# Patient Record
Sex: FEMALE | Race: WHITE | NOT HISPANIC OR LATINO | Employment: UNEMPLOYED | URBAN - METROPOLITAN AREA
[De-identification: names, ages, dates, MRNs, and addresses within clinical notes are randomized per-mention and may not be internally consistent; named-entity substitution may affect disease eponyms.]

---

## 2018-07-23 ENCOUNTER — HOSPITAL ENCOUNTER (OUTPATIENT)
Dept: FAMILY MEDICINE CLINIC | Facility: CLINIC | Age: 43
Discharge: HOME | End: 2018-07-23
Attending: FAMILY MEDICINE | Admitting: FAMILY MEDICINE

## 2018-07-24 ENCOUNTER — HOSPITAL ENCOUNTER (OUTPATIENT)
Dept: FAMILY MEDICINE CLINIC | Facility: CLINIC | Age: 43
Setting detail: SPECIMEN
Discharge: HOME | End: 2018-07-24
Attending: FAMILY MEDICINE | Admitting: FAMILY MEDICINE

## 2018-07-24 LAB
ALBUMIN SERPL-MCNC: 4.5 G/DL (ref 3.5–4.8)
ALBUMIN/GLOB SERPL: 1.4 {RATIO} (ref 1–1.7)
ALP SERPL-CCNC: 52 IU/L (ref 32–91)
ALT SERPL-CCNC: 23 IU/L (ref 14–54)
ANION GAP SERPL CALC-SCNC: 10.6 MMOL/L (ref 10–20)
AST SERPL-CCNC: 27 IU/L (ref 15–41)
BASOPHILS # BLD AUTO: 0.1 10*3/UL (ref 0–0.2)
BASOPHILS NFR BLD AUTO: 1 % (ref 0–2)
BILIRUB SERPL-MCNC: 0.6 MG/DL (ref 0.3–1.2)
BUN SERPL-MCNC: 4 MG/DL (ref 8–20)
BUN/CREAT SERPL: 6.7 (ref 5.4–26.2)
CALCIUM SERPL-MCNC: 9.2 MG/DL (ref 8.9–10.3)
CHLORIDE SERPL-SCNC: 103 MMOL/L (ref 101–111)
CHOLEST SERPL-MCNC: 240 MG/DL
CHOLEST/HDLC SERPL: 5.1 {RATIO}
CONV CO2: 23 MMOL/L (ref 22–32)
CONV LDL CHOLESTEROL DIRECT: 175 MG/DL (ref 0–100)
CONV TOTAL PROTEIN: 7.7 G/DL (ref 6.1–7.9)
CREAT UR-MCNC: 0.6 MG/DL (ref 0.4–1)
DIFFERENTIAL METHOD BLD: (no result)
EOSINOPHIL # BLD AUTO: 0.2 10*3/UL (ref 0–0.3)
EOSINOPHIL # BLD AUTO: 1 % (ref 0–3)
ERYTHROCYTE [DISTWIDTH] IN BLOOD BY AUTOMATED COUNT: 17.3 % (ref 11.5–14.5)
ERYTHROCYTE [SEDIMENTATION RATE] IN BLOOD BY WESTERGREN METHOD: 18 MM/HR (ref 0–20)
GLOBULIN UR ELPH-MCNC: 3.2 G/DL (ref 2.5–3.8)
GLUCOSE SERPL-MCNC: 94 MG/DL (ref 65–99)
HCT VFR BLD AUTO: 37.1 % (ref 35–49)
HDLC SERPL-MCNC: 47 MG/DL
HGB BLD-MCNC: 11.7 G/DL (ref 12–15)
LDLC/HDLC SERPL: 3.7 {RATIO}
LIPID INTERPRETATION: ABNORMAL
LYMPHOCYTES # BLD AUTO: 2.3 10*3/UL (ref 0.8–4.8)
LYMPHOCYTES NFR BLD AUTO: 20 % (ref 18–42)
MCH RBC QN AUTO: 24.1 PG (ref 26–32)
MCHC RBC AUTO-ENTMCNC: 31.6 G/DL (ref 32–36)
MCV RBC AUTO: 76.5 FL (ref 80–94)
MONOCYTES # BLD AUTO: 0.7 10*3/UL (ref 0.1–1.3)
MONOCYTES NFR BLD AUTO: 6 % (ref 2–11)
NEUTROPHILS # BLD AUTO: 8.4 10*3/UL (ref 2.3–8.6)
NEUTROPHILS NFR BLD AUTO: 72 % (ref 50–75)
NRBC BLD AUTO-RTO: 0 /100{WBCS}
NRBC/RBC NFR BLD MANUAL: 0 10*3/UL
PLATELET # BLD AUTO: 336 10*3/UL (ref 150–450)
PMV BLD AUTO: 9.1 FL (ref 7.4–10.4)
POTASSIUM SERPL-SCNC: 3.6 MMOL/L (ref 3.6–5.1)
RBC # BLD AUTO: 4.85 10*6/UL (ref 4–5.4)
SODIUM SERPL-SCNC: 133 MMOL/L (ref 136–144)
TRIGL SERPL-MCNC: 153 MG/DL
URATE SERPL-MCNC: 5.8 MG/DL (ref 2.6–8)
VLDLC SERPL CALC-MCNC: 17.6 MG/DL
WBC # BLD AUTO: 11.6 10*3/UL (ref 4.5–11.5)

## 2018-07-25 LAB
ANA SER QL IA: NORMAL
CONV HIV-1/ HIV-2: NORMAL
CONV HIV-1/ HIV-2: NORMAL

## 2018-07-26 LAB
HAV IGM SERPL QL IA: NONREACTIVE
HBV CORE IGM SERPL QL IA: NONREACTIVE
HBV SURFACE AG SERPL QL IA: NONREACTIVE
HCV AB SER DONR QL: NORMAL
HCV AB SER DONR QL: NORMAL

## 2018-08-20 ENCOUNTER — HOSPITAL ENCOUNTER (OUTPATIENT)
Dept: FAMILY MEDICINE CLINIC | Facility: CLINIC | Age: 43
Setting detail: SPECIMEN
Discharge: HOME | End: 2018-08-20
Attending: FAMILY MEDICINE | Admitting: FAMILY MEDICINE

## 2018-08-20 LAB
BACTERIA SPEC AEROBE CULT: NORMAL
C TRACH RRNA SPEC QL PROBE: NORMAL
Lab: NORMAL
MICRO REPORT STATUS: NORMAL
N GONORRHOEA RRNA SPEC QL PROBE: NORMAL
SPECIMEN SOURCE: NORMAL
SPECIMEN SOURCE: NORMAL

## 2018-08-29 ENCOUNTER — HOSPITAL ENCOUNTER (OUTPATIENT)
Dept: MAMMOGRAPHY | Facility: HOSPITAL | Age: 43
Discharge: HOME | End: 2018-08-29
Attending: FAMILY MEDICINE | Admitting: FAMILY MEDICINE

## 2018-09-10 ENCOUNTER — HOSPITAL ENCOUNTER (OUTPATIENT)
Dept: FAMILY MEDICINE CLINIC | Facility: CLINIC | Age: 43
Discharge: HOME | End: 2018-09-10
Attending: FAMILY MEDICINE | Admitting: FAMILY MEDICINE

## 2018-10-05 ENCOUNTER — OUTSIDE FACILITY SERVICE (OUTPATIENT)
Dept: NEUROLOGY | Facility: CLINIC | Age: 43
End: 2018-10-05

## 2018-10-05 ENCOUNTER — HOSPITAL ENCOUNTER (OUTPATIENT)
Dept: NEUROLOGY | Facility: HOSPITAL | Age: 43
Discharge: HOME | End: 2018-10-05
Attending: FAMILY MEDICINE | Admitting: FAMILY MEDICINE

## 2018-10-05 PROCEDURE — 95908 NRV CNDJ TST 3-4 STUDIES: CPT | Performed by: PSYCHIATRY & NEUROLOGY

## 2018-10-05 PROCEDURE — 95886 MUSC TEST DONE W/N TEST COMP: CPT | Performed by: PSYCHIATRY & NEUROLOGY

## 2018-12-03 ENCOUNTER — HOSPITAL ENCOUNTER (OUTPATIENT)
Dept: FAMILY MEDICINE CLINIC | Facility: CLINIC | Age: 43
Setting detail: SPECIMEN
Discharge: HOME | End: 2018-12-03
Attending: FAMILY MEDICINE | Admitting: FAMILY MEDICINE

## 2018-12-03 LAB
ERYTHROCYTE [SEDIMENTATION RATE] IN BLOOD BY WESTERGREN METHOD: 12 MM/HR (ref 0–20)
T4 FREE SERPL-MCNC: 0.81 NG/DL (ref 0.58–1.64)
TSH SERPL-ACNC: 1.36 UIU/ML (ref 0.34–5.6)

## 2019-01-14 ENCOUNTER — HOSPITAL ENCOUNTER (OUTPATIENT)
Dept: FAMILY MEDICINE CLINIC | Facility: CLINIC | Age: 44
Setting detail: SPECIMEN
Discharge: HOME | End: 2019-01-14
Attending: FAMILY MEDICINE | Admitting: FAMILY MEDICINE

## 2019-01-14 LAB
CHOLEST SERPL-MCNC: 186 MG/DL
CHOLEST/HDLC SERPL: 3.4 {RATIO}
CONV LDL CHOLESTEROL DIRECT: 114 MG/DL (ref 0–100)
HDLC SERPL-MCNC: 55 MG/DL
LDLC/HDLC SERPL: 2.1 {RATIO}
LIPID INTERPRETATION: ABNORMAL
TRIGL SERPL-MCNC: 234 MG/DL
VLDLC SERPL CALC-MCNC: 17.7 MG/DL

## 2019-02-08 ENCOUNTER — HOSPITAL ENCOUNTER (OUTPATIENT)
Dept: PREADMISSION TESTING | Facility: HOSPITAL | Age: 44
Discharge: HOME | End: 2019-02-08
Attending: SURGERY | Admitting: SURGERY

## 2019-02-08 LAB
BASOPHILS # BLD AUTO: 0.1 10*3/UL (ref 0–0.2)
BASOPHILS NFR BLD AUTO: 1 % (ref 0–2)
DIFFERENTIAL METHOD BLD: (no result)
EOSINOPHIL # BLD AUTO: 0.7 10*3/UL (ref 0–0.3)
EOSINOPHIL # BLD AUTO: 5 % (ref 0–3)
ERYTHROCYTE [DISTWIDTH] IN BLOOD BY AUTOMATED COUNT: 16.3 % (ref 11.5–14.5)
HCT VFR BLD AUTO: 38.7 % (ref 35–49)
HGB BLD-MCNC: 12.1 G/DL (ref 12–15)
LYMPHOCYTES # BLD AUTO: 3.8 10*3/UL (ref 0.8–4.8)
LYMPHOCYTES NFR BLD AUTO: 28 % (ref 18–42)
MCH RBC QN AUTO: 24.2 PG (ref 26–32)
MCHC RBC AUTO-ENTMCNC: 31.3 G/DL (ref 32–36)
MCV RBC AUTO: 77.2 FL (ref 80–94)
MONOCYTES # BLD AUTO: 1 10*3/UL (ref 0.1–1.3)
MONOCYTES NFR BLD AUTO: 7 % (ref 2–11)
NEUTROPHILS # BLD AUTO: 8.2 10*3/UL (ref 2.3–8.6)
NEUTROPHILS NFR BLD AUTO: 59 % (ref 50–75)
NRBC BLD AUTO-RTO: 0 /100{WBCS}
NRBC/RBC NFR BLD MANUAL: 0 10*3/UL
PLATELET # BLD AUTO: 460 10*3/UL (ref 150–450)
PMV BLD AUTO: 8.4 FL (ref 7.4–10.4)
RBC # BLD AUTO: 5.01 10*6/UL (ref 4–5.4)
WBC # BLD AUTO: 13.8 10*3/UL (ref 4.5–11.5)

## 2019-02-11 ENCOUNTER — HOSPITAL ENCOUNTER (OUTPATIENT)
Dept: PREOP | Facility: HOSPITAL | Age: 44
Setting detail: HOSPITAL OUTPATIENT SURGERY
Discharge: HOME | End: 2019-02-11
Attending: SURGERY | Admitting: SURGERY

## 2019-02-11 LAB — HCG UR QL: NEGATIVE

## 2019-03-01 ENCOUNTER — HOSPITAL ENCOUNTER (OUTPATIENT)
Dept: OTHER | Facility: HOSPITAL | Age: 44
Discharge: HOME | End: 2019-03-01
Attending: FAMILY MEDICINE | Admitting: FAMILY MEDICINE

## 2019-06-25 RX ORDER — ROPINIROLE 1 MG/1
TABLET, FILM COATED ORAL
Qty: 180 TABLET | Refills: 0 | Status: SHIPPED | OUTPATIENT
Start: 2019-06-25 | End: 2019-11-07 | Stop reason: SDUPTHER

## 2019-08-12 ENCOUNTER — OFFICE VISIT (OUTPATIENT)
Dept: FAMILY MEDICINE CLINIC | Facility: CLINIC | Age: 44
End: 2019-08-12

## 2019-08-12 VITALS
DIASTOLIC BLOOD PRESSURE: 79 MMHG | OXYGEN SATURATION: 99 % | HEIGHT: 65 IN | WEIGHT: 204 LBS | RESPIRATION RATE: 16 BRPM | BODY MASS INDEX: 33.99 KG/M2 | HEART RATE: 69 BPM | TEMPERATURE: 98 F | SYSTOLIC BLOOD PRESSURE: 128 MMHG

## 2019-08-12 DIAGNOSIS — K21.9 GASTROESOPHAGEAL REFLUX DISEASE, ESOPHAGITIS PRESENCE NOT SPECIFIED: Primary | ICD-10-CM

## 2019-08-12 DIAGNOSIS — M25.561 ACUTE PAIN OF BOTH KNEES: ICD-10-CM

## 2019-08-12 DIAGNOSIS — M25.562 ACUTE PAIN OF BOTH KNEES: ICD-10-CM

## 2019-08-12 PROBLEM — I73.9 PERIPHERAL VASCULAR DISEASE: Status: ACTIVE | Noted: 2018-12-03

## 2019-08-12 PROBLEM — M25.811 OTHER SPECIFIED JOINT DISORDERS, RIGHT SHOULDER: Status: ACTIVE | Noted: 2018-08-20

## 2019-08-12 PROBLEM — M79.2 NEUROPATHIC PAIN: Status: ACTIVE | Noted: 2018-07-30

## 2019-08-12 PROBLEM — N89.8 VAGINAL DISCHARGE: Status: ACTIVE | Noted: 2018-08-20

## 2019-08-12 PROBLEM — G60.9 IDIOPATHIC PERIPHERAL NEUROPATHY: Status: ACTIVE | Noted: 2018-12-26

## 2019-08-12 PROBLEM — Z13.6 ENCOUNTER FOR LIPID SCREENING FOR CARDIOVASCULAR DISEASE: Status: ACTIVE | Noted: 2018-07-23

## 2019-08-12 PROBLEM — F17.200 SMOKER: Status: ACTIVE | Noted: 2018-07-23

## 2019-08-12 PROBLEM — Z11.8 SPECIAL SCREENING EXAMINATION FOR OTHER SPECIFIED CHLAMYDIAL DISEASES: Status: ACTIVE | Noted: 2018-07-23

## 2019-08-12 PROBLEM — K14.8 TONGUE LESION: Status: ACTIVE | Noted: 2019-01-14

## 2019-08-12 PROBLEM — F32.A DEPRESSION: Status: ACTIVE | Noted: 2018-07-23

## 2019-08-12 PROBLEM — M25.571 ARTHRALGIA OF RIGHT FOOT: Status: ACTIVE | Noted: 2018-07-23

## 2019-08-12 PROBLEM — G25.81 RESTLESS LEG SYNDROME: Status: ACTIVE | Noted: 2018-07-30

## 2019-08-12 PROBLEM — E66.9 OBESITY: Status: ACTIVE | Noted: 2018-07-23

## 2019-08-12 PROBLEM — G43.909 MIGRAINE HEADACHE: Status: ACTIVE | Noted: 2018-07-23

## 2019-08-12 PROBLEM — Z82.0 FAMILY HISTORY OF MULTIPLE SCLEROSIS: Status: ACTIVE | Noted: 2018-09-27

## 2019-08-12 PROBLEM — Z00.00 ENCOUNTER FOR GENERAL ADULT MEDICAL EXAMINATION WITHOUT ABNORMAL FINDINGS: Status: ACTIVE | Noted: 2018-08-20

## 2019-08-12 PROBLEM — R82.90 ABNORMAL URINALYSIS: Status: ACTIVE | Noted: 2018-08-20

## 2019-08-12 PROBLEM — R53.83 FATIGUE: Status: ACTIVE | Noted: 2018-07-23

## 2019-08-12 PROBLEM — Z12.31 OTHER SCREENING MAMMOGRAM: Status: ACTIVE | Noted: 2018-07-30

## 2019-08-12 PROBLEM — M20.11 HALLUX VALGUS, ACQUIRED, RIGHT: Status: ACTIVE | Noted: 2018-12-26

## 2019-08-12 PROBLEM — E78.5 HYPERLIPIDEMIA: Status: ACTIVE | Noted: 2018-07-30

## 2019-08-12 PROBLEM — R73.9 HYPERGLYCEMIA: Status: ACTIVE | Noted: 2018-07-23

## 2019-08-12 PROBLEM — M25.50 PAIN IN JOINT: Status: ACTIVE | Noted: 2018-12-03

## 2019-08-12 PROBLEM — H91.90 HEARING LOSS: Status: ACTIVE | Noted: 2018-12-03

## 2019-08-12 PROBLEM — F41.9 ANXIETY DISORDER: Status: ACTIVE | Noted: 2018-12-03

## 2019-08-12 PROBLEM — M77.40 METATARSALGIA: Status: ACTIVE | Noted: 2018-12-26

## 2019-08-12 PROBLEM — Z01.419 NORMAL PELVIC EXAM: Status: ACTIVE | Noted: 2018-08-20

## 2019-08-12 PROBLEM — E55.9 VITAMIN D DEFICIENCY: Status: ACTIVE | Noted: 2018-07-23

## 2019-08-12 PROBLEM — D22.30 MELANOCYTIC NEVUS OF FACE: Status: ACTIVE | Noted: 2018-08-20

## 2019-08-12 PROBLEM — R20.0 NUMBNESS OF EXTREMITY: Status: ACTIVE | Noted: 2018-09-06

## 2019-08-12 PROBLEM — Z13.220 ENCOUNTER FOR LIPID SCREENING FOR CARDIOVASCULAR DISEASE: Status: ACTIVE | Noted: 2018-07-23

## 2019-08-12 PROBLEM — G62.9 NEUROPATHY: Status: ACTIVE | Noted: 2018-09-06

## 2019-08-12 PROBLEM — M25.50 LEG JOINT PAIN: Status: ACTIVE | Noted: 2018-07-23

## 2019-08-12 PROBLEM — M19.90 ARTHRITIS: Status: ACTIVE | Noted: 2018-07-23

## 2019-08-12 PROCEDURE — 99213 OFFICE O/P EST LOW 20 MIN: CPT | Performed by: FAMILY MEDICINE

## 2019-08-12 RX ORDER — FAMOTIDINE 40 MG/1
40 TABLET, FILM COATED ORAL DAILY
Qty: 30 TABLET | Refills: 2 | Status: SHIPPED | OUTPATIENT
Start: 2019-08-12 | End: 2019-11-07 | Stop reason: SDUPTHER

## 2019-08-12 RX ORDER — ATORVASTATIN CALCIUM 20 MG/1
1 TABLET, FILM COATED ORAL NIGHTLY
COMMUNITY
Start: 2018-09-03 | End: 2019-08-13 | Stop reason: SDUPTHER

## 2019-08-12 RX ORDER — MELOXICAM 7.5 MG/1
1 TABLET ORAL 2 TIMES DAILY
Refills: 3 | COMMUNITY
Start: 2019-07-15 | End: 2019-08-12 | Stop reason: ALTCHOICE

## 2019-08-12 RX ORDER — DULOXETIN HYDROCHLORIDE 60 MG/1
1 CAPSULE, DELAYED RELEASE ORAL 2 TIMES DAILY
Refills: 0 | COMMUNITY
Start: 2019-07-15 | End: 2019-09-12

## 2019-08-12 RX ORDER — IBUPROFEN 800 MG/1
800 TABLET ORAL EVERY 8 HOURS PRN
Qty: 90 TABLET | Refills: 0 | Status: SHIPPED | OUTPATIENT
Start: 2019-08-12 | End: 2019-09-12 | Stop reason: ALTCHOICE

## 2019-08-12 NOTE — PROGRESS NOTES
Subjective   Ellen Valladares is a 44 y.o. female.     No problems updated.  History of Present Illness     The following portions of the patient's history were reviewed and updated as appropriate: allergies, current medications, past family history, past medical history, past social history, past surgical history and problem list.    Past Medical History:   Diagnosis Date   • Ankle swelling    • Arthritis    • Depression    • Finger numbness    • High cholesterol    • History of complete eye exam     2016 & 2018 at [facility] in [place]   • History of multiple miscarriages    • History of varicose veins    • Menstrual pain     extreme   • Migraine headache    • Tubal pregnancy        Past Surgical History:   Procedure Laterality Date   • OTHER SURGICAL HISTORY      multiple miscarriages last on in 2016   • OTHER SURGICAL HISTORY      s/p right fallopain tube surgery due to tubal pregnancy per [facility]   • TUMOR REMOVAL      scheduled to have3 tumor removed on  02/22/2019       Family History   Problem Relation Age of Onset   • Allergies Mother    • Stroke Mother    • Arthritis Father    • Mental illness Father    • Stroke Father    • Diabetes Maternal Grandfather        Review of Systems   Musculoskeletal: Positive for arthralgias and gait problem.       Objective   Physical Exam   Constitutional: She appears well-developed and well-nourished.   HENT:   Head: Normocephalic and atraumatic.   Cardiovascular: Normal rate and regular rhythm.   Pulmonary/Chest: Effort normal and breath sounds normal.   Abdominal: Soft. Bowel sounds are normal.   Musculoskeletal: She exhibits tenderness. She exhibits no edema or deformity.   Limited ROM, BLE, antalgic gait, no crepitations noted  Swelling bilateral knees   Psychiatric: She has a normal mood and affect.         Assessment/Plan   Ellen was seen today for knee pain.    Diagnoses and all orders for this visit:    Gastroesophageal reflux disease, esophagitis presence  not specified    Acute pain of both knees  -     Ambulatory Referral to Orthopedic Surgery  -     ibuprofen (ADVIL,MOTRIN) 800 MG tablet; Take 1 tablet by mouth Every 8 (Eight) Hours As Needed for Moderate Pain .  -     famotidine (PEPCID) 40 MG tablet; Take 1 tablet by mouth Daily.      Refer to ortho  Rx IBU  Rx famotidne         Chief Complaint   Patient presents with   • Knee Pain     left knee pain x 3 months     Vitals:    08/12/19 1530   BP: 128/79   Pulse: 69   Resp: 16   Temp: 98 °F (36.7 °C)   SpO2: 99%     LDL Cholesterol    Date Value Ref Range Status   01/14/2019 114 (H) 0 - 100 mg/dL Final   07/24/2018 175 (H) 0 - 100 mg/dL Final

## 2019-08-14 RX ORDER — ATORVASTATIN CALCIUM 20 MG/1
TABLET, FILM COATED ORAL
Qty: 30 TABLET | Refills: 0 | Status: SHIPPED | OUTPATIENT
Start: 2019-08-14 | End: 2019-10-10 | Stop reason: SDUPTHER

## 2019-08-14 RX ORDER — ACETAMINOPHEN 160 MG
TABLET,DISINTEGRATING ORAL
Qty: 30 CAPSULE | Refills: 0 | Status: SHIPPED | OUTPATIENT
Start: 2019-08-14 | End: 2019-10-10 | Stop reason: SDUPTHER

## 2019-08-14 RX ORDER — DULOXETIN HYDROCHLORIDE 60 MG/1
CAPSULE, DELAYED RELEASE ORAL
Qty: 60 CAPSULE | OUTPATIENT
Start: 2019-08-14

## 2019-08-14 RX ORDER — MELOXICAM 7.5 MG/1
TABLET ORAL
Qty: 60 TABLET | Refills: 0 | Status: SHIPPED | OUTPATIENT
Start: 2019-08-14 | End: 2019-09-12 | Stop reason: ALTCHOICE

## 2019-08-14 RX ORDER — DULOXETIN HYDROCHLORIDE 30 MG/1
CAPSULE, DELAYED RELEASE ORAL
Qty: 60 CAPSULE | Refills: 0 | Status: SHIPPED | OUTPATIENT
Start: 2019-08-14 | End: 2019-10-10 | Stop reason: SDUPTHER

## 2019-09-12 ENCOUNTER — OFFICE VISIT (OUTPATIENT)
Dept: ORTHOPEDIC SURGERY | Facility: CLINIC | Age: 44
End: 2019-09-12

## 2019-09-12 VITALS
WEIGHT: 198 LBS | HEART RATE: 88 BPM | BODY MASS INDEX: 32.99 KG/M2 | HEIGHT: 65 IN | SYSTOLIC BLOOD PRESSURE: 125 MMHG | DIASTOLIC BLOOD PRESSURE: 84 MMHG

## 2019-09-12 DIAGNOSIS — G89.29 CHRONIC PAIN OF LEFT KNEE: Primary | ICD-10-CM

## 2019-09-12 DIAGNOSIS — M25.562 CHRONIC PAIN OF LEFT KNEE: Primary | ICD-10-CM

## 2019-09-12 DIAGNOSIS — E66.9 OBESITY (BMI 30-39.9): ICD-10-CM

## 2019-09-12 PROCEDURE — 99213 OFFICE O/P EST LOW 20 MIN: CPT | Performed by: PHYSICIAN ASSISTANT

## 2019-09-12 RX ORDER — DICLOFENAC SODIUM 75 MG/1
75 TABLET, DELAYED RELEASE ORAL 2 TIMES DAILY
Qty: 60 TABLET | Refills: 0 | Status: SHIPPED | OUTPATIENT
Start: 2019-09-12 | End: 2019-10-10 | Stop reason: ALTCHOICE

## 2019-09-12 NOTE — PROGRESS NOTES
Referring Provider: Dr. Diana Knapp  Primary Care Provider: Same         Subjective:  Chief Complaint:  Chief Complaint   Patient presents with   • Left Knee - Initial Evaluation       HPI:  Ellen Valladares is a 44 y.o. female who presents for initial visit with left knee pain ongoing for years that is increased in the last 3 to 4 months.  She denies any specific injury.  She describes a sharp, shooting pain, mainly located on the anterior aspect of the knee.  She denies any radiation, numbness, or tingling.  She does report feelings of instability and mechanical symptoms, such as popping and locking.  She reports that the pain is increased with weightbearing and she is unable to sleep at night.  She is currently taking ibuprofen, which helps slightly, and she is also previously tried meloxicam.  She denies any history of previous surgery or injections in the knee.  Referred for consultation by Dr. Knapp.    Past Medical History:   Diagnosis Date   • Ankle swelling    • Arthritis    • Depression    • Finger numbness    • High cholesterol    • History of complete eye exam     2016 & 2018 at [facility] in [place]   • History of multiple miscarriages    • History of varicose veins    • Menstrual pain     extreme   • Migraine headache    • Tubal pregnancy        Past Surgical History:   Procedure Laterality Date   • OTHER SURGICAL HISTORY      multiple miscarriages last on in 2016   • OTHER SURGICAL HISTORY      s/p right fallopain tube surgery due to tubal pregnancy per [facility]   • TUMOR REMOVAL      scheduled to have3 tumor removed on  02/22/2019       Family History   Problem Relation Age of Onset   • Allergies Mother    • Stroke Mother    • Arthritis Father    • Mental illness Father    • Stroke Father    • Diabetes Maternal Grandfather        Social History     Occupational History   • Not on file   Tobacco Use   • Smoking status: Current Every Day Smoker   • Smokeless tobacco: Never Used   Substance and  "Sexual Activity   • Alcohol use: Yes     Comment: very little   • Drug use: No   • Sexual activity: Defer        Medications:    Current Outpatient Medications:   •  atorvastatin (LIPITOR) 20 MG tablet, TAKE ONE TABLET BY MOUTH EVERY NIGHT AT BEDTIME, Disp: 30 tablet, Rfl: 0  •  Cholecalciferol (VITAMIN D3) 2000 units capsule, TAKE ONE CAPSULE BY MOUTH DAILY, Disp: 30 capsule, Rfl: 0  •  DULoxetine (CYMBALTA) 30 MG capsule, TAKE ONE CAPSULE BY MOUTH EVERY DAY FOR 7 DAYS, THEN INCREASE TO TWICE DAILY, Disp: 60 capsule, Rfl: 0  •  famotidine (PEPCID) 40 MG tablet, Take 1 tablet by mouth Daily., Disp: 30 tablet, Rfl: 2  •  rOPINIRole (REQUIP) 1 MG tablet, TAKE ONE TABLET BY MOUTH TWICE DAILY, Disp: 180 tablet, Rfl: 0  •  diclofenac (VOLTAREN) 75 MG EC tablet, Take 1 tablet by mouth 2 (Two) Times a Day. Prn joint pain, Disp: 60 tablet, Rfl: 0    Allergies:  Allergies   Allergen Reactions   • Aspirin Unknown (See Comments)   • Penicillin G Unknown (See Comments)         Review of Systems:  Gen -no fever, chills , sweats, headache   Eyes - no irritation or discharge   ENT -  no ear pain , runny nose , sore throat , difficulty swallowing   Resp - no cough , congestion , excessive expectoration   CVS - no chest pain , palpitations.   Abd - no pain , nausea , vomiting , diarrhea   Skin - no rash , lesions.   Neuro - no dizziness       Objective   Objective:    /84   Pulse 88   Ht 165.1 cm (65\")   Wt 89.8 kg (198 lb)   BMI 32.95 kg/m²     Physical Examination:  Alert, oriented, obese individual in no acute distress, ambulating unassisted  Left lower extremity shows no erythema, rashes, or open skin lesions. There is a minimal amount of swelling. It is grossly well aligned, and the patient is neurovascularly intact distally. The knee is stable to varus and valgus stress, there is no patellar maltracking or crepitus noted, and plantar and dorsiflexion is 5/5. There is mild tenderness to palpation over the lateral " aspect of the patellar tendon and with range of motion, which is about 0-125.  Positive Miles's.         Imaging:  Three-view imaging done today shows minimal degenerative change in the medial compartment.  No fractures or dislocations are appreciated.            Assessment:  1. Chronic pain of left knee    2. Obesity (BMI 30-39.9)                 Plan:  I am recommending an MRI for further investigation, as I do suspect a meniscus tear.  She will be fitted for a brace today may continue to be weightbearing as tolerated.  Weight loss is highly recommended.  I am also been a change her ibuprofen to diclofenac, as she is getting very little improvement in her pain with the ibuprofen.  She will follow-up with Dr. Carranza when the MRI has been completed and further recommendations will be pending.             ANATOLIY Clemente  09/12/19  2:21 PM

## 2019-09-20 ENCOUNTER — APPOINTMENT (OUTPATIENT)
Dept: MRI IMAGING | Facility: HOSPITAL | Age: 44
End: 2019-09-20

## 2019-10-04 ENCOUNTER — HOSPITAL ENCOUNTER (OUTPATIENT)
Dept: MRI IMAGING | Facility: HOSPITAL | Age: 44
Discharge: HOME | End: 2019-10-04
Admitting: PHYSICIAN ASSISTANT

## 2019-10-04 DIAGNOSIS — G89.29 CHRONIC PAIN OF LEFT KNEE: ICD-10-CM

## 2019-10-04 DIAGNOSIS — M25.562 CHRONIC PAIN OF LEFT KNEE: ICD-10-CM

## 2019-10-04 PROCEDURE — 73721 MRI JNT OF LWR EXTRE W/O DYE: CPT

## 2019-10-10 RX ORDER — ATORVASTATIN CALCIUM 20 MG/1
TABLET, FILM COATED ORAL
Qty: 30 TABLET | Refills: 0 | Status: SHIPPED | OUTPATIENT
Start: 2019-10-10 | End: 2019-10-10 | Stop reason: SDUPTHER

## 2019-10-10 RX ORDER — ATORVASTATIN CALCIUM 20 MG/1
20 TABLET, FILM COATED ORAL
Qty: 30 TABLET | Refills: 1 | Status: SHIPPED | OUTPATIENT
Start: 2019-10-10 | End: 2020-08-05 | Stop reason: SDUPTHER

## 2019-10-10 RX ORDER — DULOXETIN HYDROCHLORIDE 30 MG/1
CAPSULE, DELAYED RELEASE ORAL
Qty: 60 CAPSULE | Refills: 1 | Status: SHIPPED | OUTPATIENT
Start: 2019-10-10 | End: 2020-04-10 | Stop reason: SDUPTHER

## 2019-10-10 RX ORDER — ACETAMINOPHEN 160 MG
2000 TABLET,DISINTEGRATING ORAL DAILY
Qty: 30 CAPSULE | Refills: 1 | Status: SHIPPED | OUTPATIENT
Start: 2019-10-10 | End: 2020-02-26

## 2019-10-10 RX ORDER — ACETAMINOPHEN 160 MG
TABLET,DISINTEGRATING ORAL
Qty: 30 CAPSULE | Refills: 0 | Status: SHIPPED | OUTPATIENT
Start: 2019-10-10 | End: 2019-10-10 | Stop reason: SDUPTHER

## 2019-10-10 RX ORDER — MELOXICAM 7.5 MG/1
TABLET ORAL
Qty: 60 TABLET | Refills: 1 | Status: SHIPPED | OUTPATIENT
Start: 2019-10-10 | End: 2020-08-05 | Stop reason: SDUPTHER

## 2019-10-10 RX ORDER — DULOXETIN HYDROCHLORIDE 30 MG/1
CAPSULE, DELAYED RELEASE ORAL
Qty: 60 CAPSULE | Refills: 0 | Status: SHIPPED | OUTPATIENT
Start: 2019-10-10 | End: 2019-10-10 | Stop reason: SDUPTHER

## 2019-10-10 NOTE — TELEPHONE ENCOUNTER
Last visit:  8/12/19  Next visit: none  Last labs: 2/8/19    Rx requested: Vitamin D,Lipitor,Meloxicam,Duloxetine     Pharmacy: [facility] in [place]

## 2019-10-14 ENCOUNTER — HOSPITAL ENCOUNTER (EMERGENCY)
Facility: HOSPITAL | Age: 44
Discharge: LEFT BEFORE DISCHARGE | End: 2019-10-14

## 2019-10-14 VITALS
HEIGHT: 65 IN | WEIGHT: 210 LBS | TEMPERATURE: 98.4 F | HEART RATE: 92 BPM | DIASTOLIC BLOOD PRESSURE: 83 MMHG | OXYGEN SATURATION: 97 % | SYSTOLIC BLOOD PRESSURE: 142 MMHG | BODY MASS INDEX: 34.99 KG/M2 | RESPIRATION RATE: 16 BRPM

## 2019-10-14 PROCEDURE — 96375 TX/PRO/DX INJ NEW DRUG ADDON: CPT

## 2019-10-14 PROCEDURE — 96374 THER/PROPH/DIAG INJ IV PUSH: CPT

## 2019-10-14 PROCEDURE — 99284 EMERGENCY DEPT VISIT MOD MDM: CPT

## 2019-10-15 ENCOUNTER — HOSPITAL ENCOUNTER (EMERGENCY)
Facility: HOSPITAL | Age: 44
Discharge: HOME | End: 2019-10-15
Admitting: EMERGENCY MEDICINE

## 2019-10-15 ENCOUNTER — APPOINTMENT (OUTPATIENT)
Dept: CT IMAGING | Facility: HOSPITAL | Age: 44
End: 2019-10-15

## 2019-10-15 ENCOUNTER — APPOINTMENT (OUTPATIENT)
Dept: GENERAL RADIOLOGY | Facility: HOSPITAL | Age: 44
End: 2019-10-15

## 2019-10-15 VITALS
TEMPERATURE: 97.4 F | HEART RATE: 83 BPM | DIASTOLIC BLOOD PRESSURE: 57 MMHG | HEIGHT: 65 IN | WEIGHT: 205.91 LBS | OXYGEN SATURATION: 98 % | BODY MASS INDEX: 34.31 KG/M2 | RESPIRATION RATE: 16 BRPM | SYSTOLIC BLOOD PRESSURE: 125 MMHG

## 2019-10-15 DIAGNOSIS — S03.00XA JAW DISLOCATION, INITIAL ENCOUNTER: ICD-10-CM

## 2019-10-15 DIAGNOSIS — R68.84 PAIN IN LOWER JAW: Primary | ICD-10-CM

## 2019-10-15 PROCEDURE — 70486 CT MAXILLOFACIAL W/O DYE: CPT

## 2019-10-15 PROCEDURE — 25010000002 MORPHINE PER 10 MG: Performed by: NURSE PRACTITIONER

## 2019-10-15 PROCEDURE — 96374 THER/PROPH/DIAG INJ IV PUSH: CPT

## 2019-10-15 PROCEDURE — 96375 TX/PRO/DX INJ NEW DRUG ADDON: CPT

## 2019-10-15 PROCEDURE — 70355 PANORAMIC X-RAY OF JAWS: CPT

## 2019-10-15 PROCEDURE — 25010000002 LORAZEPAM PER 2 MG: Performed by: NURSE PRACTITIONER

## 2019-10-15 RX ORDER — SODIUM CHLORIDE 0.9 % (FLUSH) 0.9 %
10 SYRINGE (ML) INJECTION AS NEEDED
Status: DISCONTINUED | OUTPATIENT
Start: 2019-10-15 | End: 2019-10-15 | Stop reason: HOSPADM

## 2019-10-15 RX ORDER — LORAZEPAM 2 MG/ML
1 INJECTION INTRAMUSCULAR ONCE
Status: COMPLETED | OUTPATIENT
Start: 2019-10-15 | End: 2019-10-15

## 2019-10-15 RX ORDER — MORPHINE SULFATE 4 MG/ML
4 INJECTION, SOLUTION INTRAMUSCULAR; INTRAVENOUS ONCE
Status: COMPLETED | OUTPATIENT
Start: 2019-10-15 | End: 2019-10-15

## 2019-10-15 RX ORDER — ONDANSETRON 4 MG/1
4 TABLET, ORALLY DISINTEGRATING ORAL ONCE
Status: COMPLETED | OUTPATIENT
Start: 2019-10-15 | End: 2019-10-15

## 2019-10-15 RX ORDER — ETOMIDATE 2 MG/ML
16 INJECTION INTRAVENOUS ONCE
Status: COMPLETED | OUTPATIENT
Start: 2019-10-15 | End: 2019-10-15

## 2019-10-15 RX ADMIN — LORAZEPAM 1 MG: 2 INJECTION INTRAMUSCULAR; INTRAVENOUS at 03:01

## 2019-10-15 RX ADMIN — ETOMIDATE 16 MG: 2 INJECTION, SOLUTION INTRAVENOUS at 03:20

## 2019-10-15 RX ADMIN — ONDANSETRON 4 MG: 4 TABLET, ORALLY DISINTEGRATING ORAL at 01:36

## 2019-10-15 RX ADMIN — MORPHINE SULFATE 4 MG: 4 INJECTION INTRAVENOUS at 01:36

## 2019-10-15 NOTE — ED PROVIDER NOTES
Subjective   Patient is a 44-year-old female that states she was taking a drink out of a bottle 6 hours ago when she felt her jaw pop and has been unable to close her mouth since that time.-She rates her pain a 7/10.  She states this is happened one other time about 20 years ago she states the pain is constant                      Review of Systems   Constitutional: Negative for chills, fatigue and fever.   HENT: Negative for congestion, tinnitus and trouble swallowing.         Mouth open    Eyes: Negative for photophobia, discharge and redness.   Respiratory: Negative for cough and shortness of breath.    Cardiovascular: Negative for chest pain and palpitations.   Gastrointestinal: Negative for abdominal pain, diarrhea, nausea and vomiting.   Genitourinary: Negative for dysuria, frequency and urgency.   Musculoskeletal: Negative for back pain, joint swelling and myalgias.   Skin: Negative for rash.   Neurological: Negative for dizziness and headaches.   Psychiatric/Behavioral: Negative for confusion.   All other systems reviewed and are negative.      Past Medical History:   Diagnosis Date   • Ankle swelling    • Arthritis    • Depression    • Finger numbness    • High cholesterol    • History of complete eye exam     2016 & 2018 at [facility] in [place]   • History of multiple miscarriages    • History of varicose veins    • Menstrual pain     extreme   • Migraine headache    • Tubal pregnancy        Allergies   Allergen Reactions   • Aspirin Unknown (See Comments)   • Penicillin G Unknown (See Comments)       Past Surgical History:   Procedure Laterality Date   • OTHER SURGICAL HISTORY      multiple miscarriages last on in 2016   • OTHER SURGICAL HISTORY      s/p right fallopain tube surgery due to tubal pregnancy per [facility]   • TUMOR REMOVAL      scheduled to have3 tumor removed on  02/22/2019       Family History   Problem Relation Age of Onset   • Allergies Mother    • Stroke Mother    • Arthritis  Father    • Mental illness Father    • Stroke Father    • Diabetes Maternal Grandfather        Social History     Socioeconomic History   • Marital status:      Spouse name: Not on file   • Number of children: Not on file   • Years of education: Not on file   • Highest education level: Not on file   Tobacco Use   • Smoking status: Current Every Day Smoker   • Smokeless tobacco: Never Used   Substance and Sexual Activity   • Alcohol use: Yes     Comment: very little   • Drug use: No   • Sexual activity: Defer           Objective   Physical Exam   Constitutional: She is oriented to person, place, and time. She appears well-developed and well-nourished.   HENT:   Head: Normocephalic and atraumatic.   Mouth/Throat: Uvula is midline and oropharynx is clear and moist. Mucous membranes are dry.   Patient has good phonation but states she can not close mouth.  Tenderness to palpation over the TMJ- bilaterally    Eyes: Conjunctivae and EOM are normal. Pupils are equal, round, and reactive to light.   Neck: Normal range of motion. Neck supple.   Cardiovascular: Normal rate, regular rhythm, normal heart sounds and intact distal pulses.   Pulmonary/Chest: Effort normal and breath sounds normal. No respiratory distress. She has no wheezes.   Abdominal: Soft. Bowel sounds are normal. She exhibits no distension and no mass. There is no tenderness. There is no rebound and no guarding.   Musculoskeletal: Normal range of motion. She exhibits no deformity.   Neurological: She is alert and oriented to person, place, and time. She displays normal reflexes. No cranial nerve deficit or sensory deficit. GCS eye subscore is 4. GCS verbal subscore is 5. GCS motor subscore is 6.   Skin: Skin is warm and dry. Capillary refill takes less than 2 seconds.   Psychiatric: She has a normal mood and affect.   Nursing note and vitals reviewed.      Procedures           ED Course  ED Course as of Oct 15 0330   Tue Oct 15, 2019   0323 Patient  "even 1 mg of Ativan approximately 10 minutes prior to conscious sedation patient was sedated with 16 mg of etomidate and once sedation was achieved the patient had downward and posterior pressure applied in the jaw easily slipped back into joint.  [KW]      ED Course User Index  [KW] Glenda Collins, APRN        /52   Pulse 82   Temp 97.4 °F (36.3 °C)   Resp 18   Ht 165.1 cm (65\")   Wt 93.4 kg (205 lb 14.6 oz)   SpO2 100%   BMI 34.27 kg/m²   Labs Reviewed - No data to display  Medications   sodium chloride 0.9 % flush 10 mL (not administered)   Morphine sulfate (PF) injection 4 mg (4 mg Subcutaneous Given 10/15/19 0136)   ondansetron ODT (ZOFRAN-ODT) disintegrating tablet 4 mg (4 mg Oral Given 10/15/19 0136)   etomidate (AMIDATE) injection 16 mg (16 mg Intravenous Given 10/15/19 0320)   LORazepam (ATIVAN) injection 1 mg (1 mg Intravenous Given 10/15/19 0301)     Ct Facial Bones Without Contrast    Result Date: 10/15/2019  1.  Complete anterior dislocation of the bilateral mandibular condyles. Mouth locked in the open position. No fractures. 2. Fluid level in the right maxillary sinus. This examination was interpreted by Stevenson Mera M.D. Electronically signed by:  Stevenson Mera M.D.  10/15/2019 12:43 AM              [facility]    Final diagnoses:   Pain in lower jaw   Jaw dislocation, initial encounter              Glenda Collins, APRN  10/15/19 0335    "

## 2019-11-05 ENCOUNTER — OFFICE VISIT (OUTPATIENT)
Dept: ORTHOPEDIC SURGERY | Facility: CLINIC | Age: 44
End: 2019-11-05

## 2019-11-05 VITALS
DIASTOLIC BLOOD PRESSURE: 73 MMHG | BODY MASS INDEX: 35.49 KG/M2 | HEART RATE: 74 BPM | WEIGHT: 213 LBS | SYSTOLIC BLOOD PRESSURE: 111 MMHG | HEIGHT: 65 IN

## 2019-11-05 DIAGNOSIS — M17.12 PRIMARY OSTEOARTHRITIS OF LEFT KNEE: Primary | ICD-10-CM

## 2019-11-05 DIAGNOSIS — M17.11 PRIMARY OSTEOARTHRITIS OF RIGHT KNEE: ICD-10-CM

## 2019-11-05 PROCEDURE — 20610 DRAIN/INJ JOINT/BURSA W/O US: CPT | Performed by: ORTHOPAEDIC SURGERY

## 2019-11-05 PROCEDURE — 99213 OFFICE O/P EST LOW 20 MIN: CPT | Performed by: ORTHOPAEDIC SURGERY

## 2019-11-05 RX ORDER — METHYLPREDNISOLONE ACETATE 80 MG/ML
160 INJECTION, SUSPENSION INTRA-ARTICULAR; INTRALESIONAL; INTRAMUSCULAR; SOFT TISSUE
Status: COMPLETED | OUTPATIENT
Start: 2019-11-05 | End: 2019-11-05

## 2019-11-05 RX ORDER — METHYLPREDNISOLONE ACETATE 80 MG/ML
160 INJECTION, SUSPENSION INTRA-ARTICULAR; INTRALESIONAL; INTRAMUSCULAR; SOFT TISSUE ONCE
Status: DISCONTINUED | OUTPATIENT
Start: 2019-11-05 | End: 2021-01-13

## 2019-11-05 RX ORDER — LIDOCAINE HYDROCHLORIDE 10 MG/ML
2 INJECTION, SOLUTION EPIDURAL; INFILTRATION; INTRACAUDAL; PERINEURAL
Status: COMPLETED | OUTPATIENT
Start: 2019-11-05 | End: 2019-11-05

## 2019-11-05 RX ADMIN — LIDOCAINE HYDROCHLORIDE 2 ML: 10 INJECTION, SOLUTION EPIDURAL; INFILTRATION; INTRACAUDAL; PERINEURAL at 08:50

## 2019-11-05 RX ADMIN — METHYLPREDNISOLONE ACETATE 160 MG: 80 INJECTION, SUSPENSION INTRA-ARTICULAR; INTRALESIONAL; INTRAMUSCULAR; SOFT TISSUE at 08:50

## 2019-11-05 NOTE — PROGRESS NOTES
Procedure   Large Joint Arthrocentesis: L knee  Date/Time: 11/5/2019 8:50 AM  Consent given by: patient  Site marked: site marked  Timeout: Immediately prior to procedure a time out was called to verify the correct patient, procedure, equipment, support staff and site/side marked as required   Supporting Documentation  Indications: pain   Procedure Details  Location: knee - L knee  Preparation: Patient was prepped and draped in the usual sterile fashion  Needle size: 25 G  Approach: anteromedial  Medications administered: 160 mg methylPREDNISolone acetate 80 MG/ML; 2 mL lidocaine PF 1% 1 %  Patient tolerance: patient tolerated the procedure well with no immediate complications

## 2019-11-05 NOTE — PROGRESS NOTES
FOLLOW UP VISIT    Patient: Ellen Valladares  ?  YOB: 1975    MRN: [id number]  ?  Chief Complaint   Patient presents with   • Left Knee - Pain, Edema      ?  HPI: The patient has been complaining of pain and discomfort in both her knees.  The left side is worse than the right side.  Over the past 5 to 6 months the symptoms have become progressively worse.  She has tried a brace and anti-inflammatory medication but continues to have a lot of symptoms with the knee.  She has difficulty going up and down the steps and difficulty [place].  She states that she cannot walk on uneven ground because of the knee pain and discomfort.  The patient denies any recent history of trauma to the knee.  She has had long-standing problems with her lower extremities including night pain and rest pain in the legs.  She has been worked up for a neuropathy in the past and the results of that evaluation are not known to me.  She is also been worked up for restless leg syndrome.  She states that she has tried to hold down a job but is unable to do so because of the pain and discomfort in the lower extremities.    Pain Location: left knee(s)  Radiation: none  Quality: dull, aching  Intensity/Severity: moderate  Duration: 12 month(s)  Onset quality: gradual   Timing: intermittent  Aggravating Factors: any weight bearing, kneeling, squatting and standing for prolonged time  Alleviating Factors: OTC analgesics and NSAID's  Previous Episodes: yes  Associated Symptoms: pain, swelling, decreased ROM, decreased strength  ADL Affected: ambulating  Previous Treatment: PT, brace and nsaids.    This patient is an established patient.  This problem is new to this examiner.      Allergies:   Allergies   Allergen Reactions   • Aspirin Unknown (See Comments)   • Penicillin G Unknown (See Comments)       Medications:   Home Medications:  Current Outpatient Medications on File Prior to Visit   Medication Sig   •  atorvastatin (LIPITOR) 20 MG tablet Take 1 tablet by mouth every night at bedtime.   • Cholecalciferol (VITAMIN D3) 2000 units capsule Take 1 capsule by mouth Daily.   • DULoxetine (CYMBALTA) 30 MG capsule Take 1 capsule 2x a day   • famotidine (PEPCID) 40 MG tablet Take 1 tablet by mouth Daily.   • meloxicam (MOBIC) 7.5 MG tablet TAKE ONE TABLET BY MOUTH TWICE DAILY   • rOPINIRole (REQUIP) 1 MG tablet TAKE ONE TABLET BY MOUTH TWICE DAILY     No current facility-administered medications on file prior to visit.      Current Medications:  Scheduled Meds:  PRN Meds:.    I have reviewed the patient's medical history in detail and updated the computerized patient record.  Review and summarization of old records include:    Past Medical History:   Diagnosis Date   • Ankle swelling    • Arthritis    • Depression    • Finger numbness    • High cholesterol    • History of complete eye exam     2016 & 2018 at [facility] in [place]   • History of multiple miscarriages    • History of varicose veins    • Menstrual pain     extreme   • Migraine headache    • Tubal pregnancy      Past Surgical History:   Procedure Laterality Date   • OTHER SURGICAL HISTORY      multiple miscarriages last on in 2016   • OTHER SURGICAL HISTORY      s/p right fallopain tube surgery due to tubal pregnancy per [facility]   • TUMOR REMOVAL      scheduled to have3 tumor removed on  02/22/2019     Social History     Occupational History   • Not on file   Tobacco Use   • Smoking status: Current Every Day Smoker   • Smokeless tobacco: Never Used   Substance and Sexual Activity   • Alcohol use: Yes     Comment: very little   • Drug use: No   • Sexual activity: Defer      Social History     Social History Narrative   • Not on file     Family History   Problem Relation Age of Onset   • Allergies Mother    • Stroke Mother    • Arthritis Father    • Mental illness Father    • Stroke Father    • Diabetes Maternal Grandfather          Review of  "Systems  Constitutional: Negative for appetite change.   HENT: Negative.    Eyes: Negative.    Respiratory: Negative.    Cardiovascular: Negative.    Gastrointestinal: Negative.    Endocrine: Negative.    Genitourinary: Negative.    Musculoskeletal: See details of exam below.  Skin: Negative.    Allergic/Immunologic: Negative.    Hematological: Negative.    Psychiatric/Behavioral: Negative.         Wt Readings from Last 3 Encounters:   11/05/19 96.6 kg (213 lb)   10/14/19 93.4 kg (205 lb 14.6 oz)   09/12/19 89.8 kg (198 lb)     Ht Readings from Last 3 Encounters:   11/05/19 165.1 cm (65\")   10/14/19 165.1 cm (65\")   09/12/19 165.1 cm (65\")     Body mass index is 35.45 kg/m².  Facility age limit for growth percentiles is 20 years.  Vitals:    11/05/19 0817   BP: 111/73   Pulse: 74         Physical Exam  Constitutional: Patient is oriented to person, place, and time. Appears well-developed and well-nourished.   HENT:   Head: Normocephalic and atraumatic.   Eyes: Conjunctivae and EOM are normal. Pupils are equal, round, and reactive to light.   Cardiovascular: Normal rate, regular rhythm, normal heart sounds and intact distal pulses.   Pulmonary/Chest: Effort normal and breath sounds normal.   Musculoskeletal:   See detailed exam below   Neurological: Alert and oriented to person, place, and time. No sensory deficit. Coordination normal.   Skin: Skin is warm and dry. Capillary refill takes less than 2 seconds. No rash noted. No erythema.   Psychiatric: Patient has a normal mood and affect. Her behavior is normal. Judgment and thought content normal.   Nursing note and vitals reviewed.      Ortho Exam:   Left knee (varus). Patient has crepitus throughout range of motion. Positive patellar grind test. Mild effusion. Lachman is negative. Pivot shift is negative. Anterior and posterior drawer signs are negative. Significant joint line tenderness is noted on the medial aspect of the knee. Patient has a varus orientation of " the knee. There is fullness and tenderness in the Popliteal fossa. Mild distention of a Popliteal cyst is noted in this location. Range of motion in flexion is from 0-125 degrees. Neurovascular status is intact.  Dorsalis pedis and posterior tibial artery pulses are palpable. Common peroneal nerve function is well preserved. Patient's gait is cautious and antalgic. Skin and soft tissues are mildly swollen, consistent with synovitis and effusion. The patient has a significant limp with the first few steps after starting the gait cycle. Getting out of a chair takes a lot of effort due to pain on knee flexion.  Left knee (effusion). The knee is swollen. The patella is ballotable. There is thickening of the synovial membrane. There appears to be a fluid flow in the supra-patellar space. The patient's knee feels tight in flexion. Range of motion is restricted because of the limited flexion. There is some quadriceps inhibition on account of the distension of the joint. There is diffuse tenderness around the knee. The popliteal fossa is full and tender as well. No evidence of a compartmental syndrome is noted. Anterior and posterior drawer signs are negative. No medial or lateral instability is noted. Pivot shift sign is negative. Dorsalis pedis and posterior tibial artery pulses are palpable. Common peroneal nerve function is well preserved.       Diagnostics:  Reviewed MRI report of left knee, summary of impression below:   MRI images are discussed with the patient and her  who is in the room with her today.  There is a possibility of a small medial meniscus tear.  The ACL and the MCL are intact.  There is moderate amount of arthritis with subchondral edema of the medial compartment of the knee.  There is an area of full-thickness articular cartilage loss with subchondral edema.  She has a moderate to large effusion.  The lateral compartment is fairly well-preserved and there is mild patellofemoral chondromalacia.   Based on these images my recommendations are for nonoperative care at this point.    Assessment:  Ellen was seen today for pain and edema.    Diagnoses and all orders for this visit:    Primary osteoarthritis of left knee    Primary osteoarthritis of right knee          Procedures  ?    Plan    · Compression/brace to the knee pain and buckling of the knee.  · The patient's left knee was prepped and draped in a standard fashion and 40 cc of straw-colored fluid were withdrawn from a lateral approach.  This was injected by me after the aspiration with steroid into the joint.  The patient tolerated the procedure well.  · Tablet meloxicam 7.5 mg tab 1 p.o. nightly for pain swelling and discomfort.  · Calcium and vitamin D for bone health.  · Glucosamine, chondroitin and turmeric for cartilage health.  · At this point I do not recommend surgical intervention for the knee joint because she is relatively young and her imaging studies do not show progressive Need for partial or total knee arthroplasty at this point.  · Rest, ice, compression, and elevation (RICE) therapy  · Stretching and strengthening exercises of the quads and the hamstrings.  · The patient is most likely going to benefit with a rheumatology consultation.  We will be glad to make that happen for her.  · She has tried her best to hold on a job and it does not look likely that she will be able to get gainful employment because of the condition of her lower extremities.  It is therefore my discussion with the patient that she should consider a disability process going forwards.  · OTC Tylenol 500-1000mg by mouth every 6 hours as needed for pain   · Follow up in 6 month(s)    Date of encounter: 11/05/2019   Myron Carranza MD

## 2019-11-07 DIAGNOSIS — M25.562 ACUTE PAIN OF BOTH KNEES: ICD-10-CM

## 2019-11-07 DIAGNOSIS — M25.561 ACUTE PAIN OF BOTH KNEES: ICD-10-CM

## 2019-11-07 RX ORDER — FAMOTIDINE 40 MG/1
TABLET, FILM COATED ORAL
Qty: 30 TABLET | Refills: 2 | Status: SHIPPED | OUTPATIENT
Start: 2019-11-07 | End: 2020-05-05

## 2019-11-07 RX ORDER — ROPINIROLE 1 MG/1
TABLET, FILM COATED ORAL
Qty: 180 TABLET | Refills: 0 | Status: SHIPPED | OUTPATIENT
Start: 2019-11-07 | End: 2020-08-05 | Stop reason: SDUPTHER

## 2019-11-08 ENCOUNTER — TELEPHONE (OUTPATIENT)
Dept: ORTHOPEDIC SURGERY | Facility: CLINIC | Age: 44
End: 2019-11-08

## 2019-11-08 NOTE — TELEPHONE ENCOUNTER
Patient called asking who we normally refer to for rheumatology because her PCP said they would send the referral. I called the patient back and told her that we have been sending our referrals to [facility] and if she needed us to refer her that we could but she just needed to call us back and let us know.

## 2020-02-24 ENCOUNTER — TELEPHONE (OUTPATIENT)
Dept: ORTHOPEDIC SURGERY | Facility: CLINIC | Age: 45
End: 2020-02-24

## 2020-02-24 NOTE — TELEPHONE ENCOUNTER
----- Message from Ellen Valladares sent at 2/24/2020 11:09 AM EST -----  Regarding: Referral Request  Contact: [phone]  You mentioned in November (11-05-19) at my last visit that you would refer me to an Arthritis Specialist. I was hoping you would refer me to someone. I have asked my primary doctor and she recommended your recommendations, she had no one in mind.   Thank you   Ellen Valladares

## 2020-02-26 DIAGNOSIS — M19.90 ARTHRITIS: Primary | ICD-10-CM

## 2020-02-26 RX ORDER — ACETAMINOPHEN 160 MG
TABLET,DISINTEGRATING ORAL
Qty: 30 CAPSULE | Refills: 1 | Status: SHIPPED | OUTPATIENT
Start: 2020-02-26 | End: 2020-04-10 | Stop reason: SDUPTHER

## 2020-02-26 NOTE — TELEPHONE ENCOUNTER
patient     Last visit:  8/12/19  Next visit:none  Last labs: 2/8/19    Rx requested: Cholecalciferol  50 MCG (2000 UT) capsule  Pharmacy:[facility]

## 2020-02-29 RX ORDER — CLINDAMYCIN HYDROCHLORIDE 300 MG/1
300 CAPSULE ORAL 3 TIMES DAILY
Qty: 21 CAPSULE | Refills: 0 | Status: SHIPPED | OUTPATIENT
Start: 2020-02-29 | End: 2020-03-07

## 2020-02-29 NOTE — PROGRESS NOTES
Call from Ellen Valladares while on call on February 29.  She described the left lower jaw abscess.  Left side of face swollen.  Had a previously known broken tooth in that area.  She describes a blister on the inner jaw that popped and leaked foul tasting material.  Tells me her insurance will not let her go to an urgent care center.  She requested an antibiotic.  I sent in a prescription for clindamycin 100 mg 3 times daily for 7 days to [facility].  I instructed her to call her insurance as soon as possible and make arrangements for appropriate dental care.  Allergies verified.  No allergy to clindamycin.

## 2020-04-10 RX ORDER — DULOXETIN HYDROCHLORIDE 30 MG/1
CAPSULE, DELAYED RELEASE ORAL
Qty: 60 CAPSULE | Refills: 1 | Status: SHIPPED | OUTPATIENT
Start: 2020-04-10 | End: 2020-08-05 | Stop reason: SDUPTHER

## 2020-04-10 RX ORDER — ACETAMINOPHEN 160 MG
2000 TABLET,DISINTEGRATING ORAL DAILY
Qty: 30 CAPSULE | Refills: 1 | Status: SHIPPED | OUTPATIENT
Start: 2020-04-10 | End: 2021-06-28 | Stop reason: SDUPTHER

## 2020-04-10 NOTE — TELEPHONE ENCOUNTER
Vitamin D3 2,000 unit softgel  Duloxetine Hcl 30mg    [facility]    Last Seen 8/12/2019  No future appt sched.

## 2020-05-03 DIAGNOSIS — M25.562 ACUTE PAIN OF BOTH KNEES: ICD-10-CM

## 2020-05-03 DIAGNOSIS — M25.561 ACUTE PAIN OF BOTH KNEES: ICD-10-CM

## 2020-05-04 NOTE — TELEPHONE ENCOUNTER
Last visit:  8/12/19  Next visit:none  Last lab:2/8/19    Rx requested: Famotidine   Pharmacy: [facility]

## 2020-05-05 ENCOUNTER — OFFICE VISIT (OUTPATIENT)
Dept: ORTHOPEDIC SURGERY | Facility: CLINIC | Age: 45
End: 2020-05-05

## 2020-05-05 VITALS
WEIGHT: 201 LBS | HEIGHT: 65 IN | SYSTOLIC BLOOD PRESSURE: 116 MMHG | HEART RATE: 109 BPM | DIASTOLIC BLOOD PRESSURE: 81 MMHG | TEMPERATURE: 97.8 F | BODY MASS INDEX: 33.49 KG/M2

## 2020-05-05 DIAGNOSIS — M25.562 CHRONIC PAIN OF LEFT KNEE: Primary | ICD-10-CM

## 2020-05-05 DIAGNOSIS — G89.29 CHRONIC PAIN OF LEFT KNEE: Primary | ICD-10-CM

## 2020-05-05 PROCEDURE — 99213 OFFICE O/P EST LOW 20 MIN: CPT | Performed by: ORTHOPAEDIC SURGERY

## 2020-05-05 PROCEDURE — 20610 DRAIN/INJ JOINT/BURSA W/O US: CPT | Performed by: ORTHOPAEDIC SURGERY

## 2020-05-05 RX ORDER — METHYLPREDNISOLONE ACETATE 80 MG/ML
160 INJECTION, SUSPENSION INTRA-ARTICULAR; INTRALESIONAL; INTRAMUSCULAR; SOFT TISSUE
Status: COMPLETED | OUTPATIENT
Start: 2020-05-05 | End: 2020-05-05

## 2020-05-05 RX ORDER — METHYLPREDNISOLONE ACETATE 80 MG/ML
160 INJECTION, SUSPENSION INTRA-ARTICULAR; INTRALESIONAL; INTRAMUSCULAR; SOFT TISSUE ONCE
Status: COMPLETED | OUTPATIENT
Start: 2020-05-05 | End: 2020-05-05

## 2020-05-05 RX ORDER — FAMOTIDINE 40 MG/1
TABLET, FILM COATED ORAL
Qty: 30 TABLET | Refills: 0 | Status: SHIPPED | OUTPATIENT
Start: 2020-05-05 | End: 2020-08-05 | Stop reason: SDUPTHER

## 2020-05-05 RX ADMIN — METHYLPREDNISOLONE ACETATE 160 MG: 80 INJECTION, SUSPENSION INTRA-ARTICULAR; INTRALESIONAL; INTRAMUSCULAR; SOFT TISSUE at 10:09

## 2020-05-05 RX ADMIN — METHYLPREDNISOLONE ACETATE 160 MG: 80 INJECTION, SUSPENSION INTRA-ARTICULAR; INTRALESIONAL; INTRAMUSCULAR; SOFT TISSUE at 10:08

## 2020-05-05 NOTE — PROGRESS NOTES
FOLLOW UP VISIT    Patient: Ellen Valladares  ?  YOB: 1975    MRN: [id number]  ?  CC: Left knee pain and swelling.  ?  HPI: The patient has had increasing pain and discomfort on her left knee.  She has a suprapatellar effusion.  She denies any history of trauma.  She states that her knee feels tight and she finds it difficult to flex the knee beyond 90 degrees.  She has difficulty with ambulation as well.  She states that her knee wants to buckle and give out from underneath her.  She has had ongoing symptoms with this knee mostly over the patellofemoral joint but the symptoms are more marked over the past couple of weeks.  She states that her restless leg syndrome has also gotten a little bit worse because of the knee swelling.  Pain Location: Left knee on the medial aspect.  Radiation: To the medial aspect of the proximal tibia.  Quality: dull, aching  Intensity/Severity: moderate  Duration: 2 to 3 weeks.  Onset quality: gradual   Timing: constant  Aggravating Factors: Deep flexion of the knee.  Alleviating Factors: Anti-inflammatory medication.  Previous Episodes: yes  Associated Symptoms: pain, swelling  Previous Treatment: Intra-articular steroid injections.    This patient is an established patient.  This problem is not new to this examiner.      Allergies:   Allergies   Allergen Reactions   • Aspirin Unknown (See Comments)   • Penicillin G Unknown (See Comments)       Medications:   Home Medications:  Current Outpatient Medications on File Prior to Visit   Medication Sig   • atorvastatin (LIPITOR) 20 MG tablet Take 1 tablet by mouth every night at bedtime.   • Cholecalciferol (VITAMIN D3) 50 MCG (2000 UT) capsule Take 1 capsule by mouth Daily.   • DULoxetine (CYMBALTA) 30 MG capsule Take 1 capsule 2x a day   • famotidine (PEPCID) 40 MG tablet TAKE ONE TABLET BY MOUTH DAILY   • meloxicam (MOBIC) 7.5 MG tablet TAKE ONE TABLET BY MOUTH TWICE DAILY   • rOPINIRole (REQUIP) 1 MG tablet TAKE ONE TABLET  BY MOUTH TWICE DAILY   • [DISCONTINUED] famotidine (PEPCID) 40 MG tablet TAKE ONE TABLET BY MOUTH DAILY     Current Facility-Administered Medications on File Prior to Visit   Medication   • methylPREDNISolone acetate (DEPO-medrol) injection 160 mg     Current Medications:  Scheduled Meds:    methylPREDNISolone acetate 160 mg Intra-articular Once     PRN Meds:.    I have reviewed the patient's medical history in detail and updated the computerized patient record.  Review and summarization of old records include:    Past Medical History:   Diagnosis Date   • Ankle swelling    • Arthritis    • Depression    • Finger numbness    • High cholesterol    • History of complete eye exam     2016 & 2018 at [facility] in [place]   • History of multiple miscarriages    • History of varicose veins    • Menstrual pain     extreme   • Migraine headache    • Tubal pregnancy      Past Surgical History:   Procedure Laterality Date   • OTHER SURGICAL HISTORY      multiple miscarriages last on in 2016   • OTHER SURGICAL HISTORY      s/p right fallopain tube surgery due to tubal pregnancy per [facility]   • TUMOR REMOVAL      scheduled to have3 tumor removed on  02/22/2019     Social History     Occupational History   • Not on file   Tobacco Use   • Smoking status: Current Every Day Smoker     Packs/day: 0.50   • Smokeless tobacco: Never Used   Substance and Sexual Activity   • Alcohol use: Yes     Comment: very little   • Drug use: No   • Sexual activity: Defer      Social History     Social History Narrative   • Not on file     Family History   Problem Relation Age of Onset   • Allergies Mother    • Stroke Mother    • Arthritis Father    • Mental illness Father    • Stroke Father    • Diabetes Maternal Grandfather          Review of Systems   Constitutional: Negative.  Negative for fever.   HENT: Negative.    Eyes: Negative.    Respiratory: Negative.    Cardiovascular: Negative.    Endocrine: Negative.    Musculoskeletal:  "Positive for arthralgias, gait problem and joint swelling.   Skin: Negative.  Negative for rash and wound.   Allergic/Immunologic: Negative.    Neurological: Negative for numbness.   Hematological: Negative.    Psychiatric/Behavioral: Negative.           Wt Readings from Last 3 Encounters:   05/05/20 91.2 kg (201 lb)   11/05/19 96.6 kg (213 lb)   10/14/19 93.4 kg (205 lb 14.6 oz)     Ht Readings from Last 3 Encounters:   05/05/20 165.1 cm (65\")   11/05/19 165.1 cm (65\")   10/14/19 165.1 cm (65\")     Body mass index is 33.45 kg/m².  Facility age limit for growth percentiles is 20 years.  Vitals:    05/05/20 0938   BP: 116/81   Pulse: 109   Temp: 97.8 °F (36.6 °C)         Physical Exam   Constitutional: Patient is oriented to person, place, and time. Appears well-developed and well-nourished.   HENT:   Head: Normocephalic and atraumatic.   Eyes: Conjunctivae and EOM are normal. Pupils are equal, round, and reactive to light.   Cardiovascular: Normal rate, regular rhythm, normal heart sounds and intact distal pulses.   Pulmonary/Chest: Effort normal and breath sounds normal.   Musculoskeletal:   See detailed exam below   Neurological: Alert and oriented to person, place, and time. No sensory deficit. Coordination normal.   Skin: Skin is warm and dry. Capillary refill takes less than 2 seconds. No rash noted. No erythema.   Psychiatric: Patient has a normal mood and affect. Her behavior is normal. Judgment and thought content normal.   Nursing note and vitals reviewed.    Ortho Exam:   Left knee (effusion). The knee is swollen. The patella is ballotable. There is thickening of the synovial membrane. There appears to be a fluid flow in the supra-patellar space. The patient's knee feels tight in flexion. Range of motion is restricted because of the limited flexion. There is some quadriceps inhibition on account of the distension of the joint. There is diffuse tenderness around the knee. The popliteal fossa is full and " tender as well. No evidence of a compartmental syndrome is noted. Anterior and posterior drawer signs are negative. No medial or lateral instability is noted. Pivot shift sign is negative. Dorsalis pedis and posterior tibial artery pulses are palpable. Common peroneal nerve function is well preserved. Her current range of motion is from 10 to 90 degrees because of the effusion.  Further flexion is associated with pain and discomfort.      Diagnostics:       Assessment:  Diagnoses and all orders for this visit:    Chronic pain of left knee  -     Large Joint Arthrocentesis: L knee  -     methylPREDNISolone acetate (DEPO-medrol) injection 160 mg          Large Joint Arthrocentesis: L knee  Date/Time: 5/5/2020 10:08 AM  Consent given by: patient  Site marked: site marked  Timeout: Immediately prior to procedure a time out was called to verify the correct patient, procedure, equipment, support staff and site/side marked as required   Supporting Documentation  Indications: pain   Procedure Details  Location: knee - L knee  Preparation: Patient was prepped and draped in the usual sterile fashion  Needle size: 25 G  Approach: anteromedial  Medications administered: 160 mg methylPREDNISolone acetate 80 MG/ML  Patient tolerance: patient tolerated the procedure well with no immediate complications        ?    Plan    · Compression/brace to the knee to prevent it from buckling and giving her.  · The left knee was prepped and draped in a standard fashion and full consent was obtained.  The patient was in a supine position and 20 cc of straw-colored fluid were aspirated from the knee using a lateral approach.  This was followed by an injection to the knee with a steroid.  · Calcium and vitamin D for bone health.  · Glucosamine, chondroitin and turmeric for cartilage health.  · Rest, ice, compression, and elevation (RICE) therapy  · Stretching and strengthening exercises of the quads and the hamstrings.  · OTC Tylenol 500-1000mg  by mouth every 6 hours as needed for pain   · Follow up in 3 month(s)    Myron Carranza MD  05/05/2020

## 2020-08-04 ENCOUNTER — OFFICE VISIT (OUTPATIENT)
Dept: ORTHOPEDIC SURGERY | Facility: CLINIC | Age: 45
End: 2020-08-04

## 2020-08-04 ENCOUNTER — TELEPHONE (OUTPATIENT)
Dept: ORTHOPEDIC SURGERY | Facility: CLINIC | Age: 45
End: 2020-08-04

## 2020-08-04 VITALS — BODY MASS INDEX: 33.07 KG/M2 | HEIGHT: 66 IN | WEIGHT: 205.8 LBS

## 2020-08-04 DIAGNOSIS — M17.12 PRIMARY OSTEOARTHRITIS OF LEFT KNEE: ICD-10-CM

## 2020-08-04 DIAGNOSIS — M17.11 PRIMARY OSTEOARTHRITIS OF RIGHT KNEE: Primary | ICD-10-CM

## 2020-08-04 PROCEDURE — 99213 OFFICE O/P EST LOW 20 MIN: CPT | Performed by: ORTHOPAEDIC SURGERY

## 2020-08-04 NOTE — PROGRESS NOTES
FOLLOW UP VISIT    Patient: Ellen Valladares  ?  YOB: 1975    MRN: [id number]  ?  Chief Complaint   Patient presents with   • Left Knee - Follow-up      ?  HPI:   Ellen Alfaro is following up in the office complaining of left knee pain and discomfort.  The symptoms are slowly and progressively getting worse.  She states that the injection really did not help her to any significant degree.  She is wanting to try Visco supplementation injections.  At age 45 she is too young for total knee replacement.  She states that the pain bothers her significantly and she cannot walk for exercise.  She has difficulty going up and down the steps.  She does use a brace which has improved her walking distance to some degree.  She would like a more active quality of life but cannot do so because of persistent knee pain and discomfort.      Pain Location: LEFT knee  Radiation: none  Quality: dull, aching  Intensity/Severity: moderate  Duration: 3 years  Onset quality: gradual   Timing: intermittent  Aggravating Factors: kneeling, squatting  Alleviating Factors: NSAIDs  Previous Episodes: yes  Associated Symptoms: pain, swelling  ADLs Affected: ambulating, work related activities  Previous Treatment: injections of steroid    This patient is an established patient.  This problem is not new to this examiner.      Allergies:   Allergies   Allergen Reactions   • Aspirin Unknown (See Comments)   • Penicillin G Unknown (See Comments)       Medications:   Home Medications:  Current Outpatient Medications on File Prior to Visit   Medication Sig   • atorvastatin (LIPITOR) 20 MG tablet Take 1 tablet by mouth every night at bedtime.   • Cholecalciferol (VITAMIN D3) 50 MCG (2000 UT) capsule Take 1 capsule by mouth Daily.   • DULoxetine (CYMBALTA) 30 MG capsule Take 1 capsule 2x a day   • famotidine (PEPCID) 40 MG tablet TAKE ONE TABLET BY MOUTH DAILY   • meloxicam (MOBIC) 7.5 MG tablet TAKE ONE TABLET BY MOUTH TWICE DAILY   •  rOPINIRole (REQUIP) 1 MG tablet TAKE ONE TABLET BY MOUTH TWICE DAILY     Current Facility-Administered Medications on File Prior to Visit   Medication   • methylPREDNISolone acetate (DEPO-medrol) injection 160 mg     Current Medications:  Scheduled Meds:    methylPREDNISolone acetate 160 mg Intra-articular Once     PRN Meds:.    I have reviewed the patient's medical history in detail and updated the computerized patient record.  Review and summarization of old records include:    Past Medical History:   Diagnosis Date   • Ankle swelling    • Arthritis    • Depression    • Finger numbness    • High cholesterol    • History of complete eye exam     2016 & 2018 at [facility] in [place]   • History of multiple miscarriages    • History of varicose veins    • Menstrual pain     extreme   • Migraine headache    • Tubal pregnancy      Past Surgical History:   Procedure Laterality Date   • OTHER SURGICAL HISTORY      multiple miscarriages last on in 2016   • OTHER SURGICAL HISTORY      s/p right fallopain tube surgery due to tubal pregnancy per [facility]   • TUMOR REMOVAL      scheduled to have3 tumor removed on  02/22/2019     Social History     Occupational History   • Not on file   Tobacco Use   • Smoking status: Current Every Day Smoker     Packs/day: 0.50   • Smokeless tobacco: Never Used   Substance and Sexual Activity   • Alcohol use: Yes     Comment: very little   • Drug use: No   • Sexual activity: Defer      Family History   Problem Relation Age of Onset   • Allergies Mother    • Stroke Mother    • Arthritis Father    • Mental illness Father    • Stroke Father    • Diabetes Maternal Grandfather          Review of Systems   Constitutional: Negative.  Negative for fever.   HENT: Negative.    Eyes: Negative.    Respiratory: Negative.    Cardiovascular: Negative.    Gastrointestinal: Negative.    Endocrine: Negative.    Genitourinary: Negative.    Musculoskeletal: Positive for arthralgias, gait problem and  "joint swelling.   Skin: Negative.  Negative for rash and wound.   Allergic/Immunologic: Negative.    Neurological: Negative for numbness.   Hematological: Negative.    Psychiatric/Behavioral: Negative.           Wt Readings from Last 3 Encounters:   08/04/20 93.4 kg (205 lb 12.8 oz)   05/05/20 91.2 kg (201 lb)   11/05/19 96.6 kg (213 lb)     Ht Readings from Last 3 Encounters:   08/04/20 166.4 cm (65.5\")   05/05/20 165.1 cm (65\")   11/05/19 165.1 cm (65\")     Body mass index is 33.73 kg/m².  Facility age limit for growth percentiles is 20 years.  There were no vitals filed for this visit.      Physical Exam  Constitutional: Patient is oriented to person, place, and time. Appears well-developed and well-nourished.   HENT:   Head: Normocephalic and atraumatic.   Eyes: Conjunctivae and EOM are normal. Pupils are equal, round, and reactive to light.   Cardiovascular: Normal rate, regular rhythm, normal heart sounds and intact distal pulses.   Pulmonary/Chest: Effort normal and breath sounds normal.   Musculoskeletal:   See detailed exam below   Neurological: Alert and oriented to person, place, and time. No sensory deficit. Coordination normal.   Skin: Skin is warm and dry. Capillary refill takes less than 2 seconds. No rash noted. No erythema.   Psychiatric: Patient has a normal mood and affect. Her behavior is normal. Judgment and thought content normal.   Nursing note and vitals reviewed.      Ortho Exam:   Left knee (effusion). The knee is swollen. The patella is ballotable. There is thickening of the synovial membrane. There appears to be a fluid flow in the supra-patellar space. The patient's knee feels tight in flexion. Range of motion is restricted because of the limited flexion. There is some quadriceps inhibition on account of the distension of the joint. There is diffuse tenderness around the knee. The popliteal fossa is full and tender as well. No evidence of a compartmental syndrome is noted. Anterior and " posterior drawer signs are negative. No medial or lateral instability is noted. Pivot shift sign is negative. Dorsalis pedis and posterior tibial artery pulses are palpable. Common peroneal nerve function is well preserved.         Diagnostics:  no diagnostic testing performed this visit  Previously obtained x-rays are reviewed and show some narrowing of the medial joint space.  The films do not reveal bone-on-bone appearance just yet.  There is some sclerosis over the proximal aspect of the medial tibial plateau.    Assessment:  Ellen was seen today for follow-up.    Diagnoses and all orders for this visit:    Primary osteoarthritis of right knee          Procedures  ?    Plan    · Compression/brace to the knee to prevent it from buckling and giving her.  · Intra-articular Visco supplementation injections discussed with the patient including all risks and benefits in detail.  · We will call her insurance company to see if they will approve her to have the Visco supplementation injections for symptom improvement.  · Discussed with the patient about possibility of partial knee replacement surgery.  At this point I do not think that in total knee arthroplasty is the best option for this patient given her relatively young age and the relative paucity of symptoms and radiographic findings.  · If the Visco supplementation injections do not give her the adequate relief of symptoms she is looking for then I feel a partial medial compartmental knee replacement might be optimal for this patient.  · Rest, ice, compression, and elevation (RICE) therapy  · Stretching and strengthening exercises of the quads and the hamstrings.  · Calcium and vitamin D for bone health.  · Ibuprofen 600mg by mouth every 6-8 hours as needed for pain and swelling  · Follow up in 3 month(s) for possible injection of Visco supplementation to the knee.    Date of encounter: 08/04/2020   Myron Carranza MD

## 2020-08-05 DIAGNOSIS — M25.561 ACUTE PAIN OF BOTH KNEES: ICD-10-CM

## 2020-08-05 DIAGNOSIS — M25.562 ACUTE PAIN OF BOTH KNEES: ICD-10-CM

## 2020-08-05 RX ORDER — ATORVASTATIN CALCIUM 20 MG/1
20 TABLET, FILM COATED ORAL
Qty: 15 TABLET | Refills: 0 | Status: SHIPPED | OUTPATIENT
Start: 2020-08-05 | End: 2021-07-04 | Stop reason: SDUPTHER

## 2020-08-05 RX ORDER — MELOXICAM 7.5 MG/1
7.5 TABLET ORAL 2 TIMES DAILY
Qty: 30 TABLET | Refills: 0 | OUTPATIENT
Start: 2020-08-05 | End: 2021-01-13

## 2020-08-05 RX ORDER — ROPINIROLE 1 MG/1
1 TABLET, FILM COATED ORAL 2 TIMES DAILY
Qty: 30 TABLET | Refills: 0 | Status: SHIPPED | OUTPATIENT
Start: 2020-08-05 | End: 2021-06-28 | Stop reason: SDUPTHER

## 2020-08-05 RX ORDER — ACETAMINOPHEN 160 MG
2000 TABLET,DISINTEGRATING ORAL DAILY
Qty: 30 CAPSULE | Refills: 1 | OUTPATIENT
Start: 2020-08-05

## 2020-08-05 RX ORDER — FAMOTIDINE 40 MG/1
40 TABLET, FILM COATED ORAL DAILY
Qty: 15 TABLET | Refills: 0 | Status: SHIPPED | OUTPATIENT
Start: 2020-08-05 | End: 2021-06-28 | Stop reason: SDUPTHER

## 2020-08-05 RX ORDER — DULOXETIN HYDROCHLORIDE 30 MG/1
CAPSULE, DELAYED RELEASE ORAL
Qty: 30 CAPSULE | Refills: 0 | Status: SHIPPED | OUTPATIENT
Start: 2020-08-05 | End: 2021-06-28 | Stop reason: SDUPTHER

## 2020-08-13 NOTE — TELEPHONE ENCOUNTER
Auth received from Dee,  Hyaluronan or derivative, Monovisc, for intra articular inj, per dose ( 1 total unit, 1 unit per inj x 1 inj Left knee, 8/4/2020 to 2/4/2021)    Auth# [id number]    Call placed to patient, left message on machine advising Monovisc had been approved and to call back to make appt for injection as per Dr. Carranza.

## 2021-01-07 ENCOUNTER — HOSPITAL ENCOUNTER (EMERGENCY)
Facility: HOSPITAL | Age: 46
Discharge: HOME | End: 2021-01-07
Admitting: EMERGENCY MEDICINE

## 2021-01-07 VITALS
WEIGHT: 194.67 LBS | RESPIRATION RATE: 16 BRPM | HEART RATE: 70 BPM | OXYGEN SATURATION: 100 % | BODY MASS INDEX: 32.43 KG/M2 | DIASTOLIC BLOOD PRESSURE: 63 MMHG | SYSTOLIC BLOOD PRESSURE: 120 MMHG | TEMPERATURE: 97.6 F | HEIGHT: 65 IN

## 2021-01-07 DIAGNOSIS — R42 DIZZINESS: ICD-10-CM

## 2021-01-07 DIAGNOSIS — H72.91 RUPTURED TYMPANIC MEMBRANE, RIGHT: Primary | ICD-10-CM

## 2021-01-07 PROCEDURE — 63710000001 ONDANSETRON ODT 4 MG TABLET DISPERSIBLE: Performed by: NURSE PRACTITIONER

## 2021-01-07 PROCEDURE — 99283 EMERGENCY DEPT VISIT LOW MDM: CPT

## 2021-01-07 RX ORDER — CEFDINIR 300 MG/1
300 CAPSULE ORAL 2 TIMES DAILY
Qty: 10 CAPSULE | Refills: 0 | OUTPATIENT
Start: 2021-01-07 | End: 2021-01-13

## 2021-01-07 RX ORDER — NEOMYCIN SULFATE, POLYMYXIN B SULFATE AND HYDROCORTISONE 10; 3.5; 1 MG/ML; MG/ML; [USP'U]/ML
4 SUSPENSION/ DROPS AURICULAR (OTIC) EVERY 6 HOURS SCHEDULED
Status: DISCONTINUED | OUTPATIENT
Start: 2021-01-07 | End: 2021-01-07 | Stop reason: HOSPADM

## 2021-01-07 RX ORDER — MECLIZINE HYDROCHLORIDE 25 MG/1
25 TABLET ORAL 4 TIMES DAILY PRN
Qty: 20 TABLET | Refills: 0 | OUTPATIENT
Start: 2021-01-07 | End: 2021-01-13

## 2021-01-07 RX ORDER — MECLIZINE HYDROCHLORIDE 25 MG/1
50 TABLET ORAL ONCE
Status: COMPLETED | OUTPATIENT
Start: 2021-01-07 | End: 2021-01-07

## 2021-01-07 RX ORDER — ONDANSETRON 4 MG/1
4 TABLET, ORALLY DISINTEGRATING ORAL ONCE
Status: COMPLETED | OUTPATIENT
Start: 2021-01-07 | End: 2021-01-07

## 2021-01-07 RX ADMIN — MECLIZINE HYDROCHLORIDE 50 MG: 25 TABLET ORAL at 16:08

## 2021-01-07 RX ADMIN — ONDANSETRON 4 MG: 4 TABLET, ORALLY DISINTEGRATING ORAL at 16:08

## 2021-01-07 RX ADMIN — NEOMYCIN SULFATE, POLYMYXIN B SULFATE AND HYDROCORTISONE 4 DROP: 10; 3.5; 1 SUSPENSION/ DROPS AURICULAR (OTIC) at 17:22

## 2021-01-07 NOTE — ED NOTES
Pt c/o dizziness and nausea. Pt reports pain in right ear and she states she has had a lot of pressure in the ear lately.      Jalyn Wilson RN  01/07/21 [id number]

## 2021-01-07 NOTE — DISCHARGE INSTRUCTIONS
Take medications as prescribed.  As discussed, nothing in your ear besides the eardrops.  No swimming or taking a bath.  Okay to shower.  Schedule recheck with your primary care physician for recheck.  Schedule follow-up with ENT in 2 weeks if your hearing does not return.  Return to the ER for any new or worsening symptoms.

## 2021-01-07 NOTE — ED PROVIDER NOTES
"Subjective   45-year-old female presents with a 2-month history of her right ear \"being clogged\" she also reports dizziness since 1130 this morning that is worse with movement, nausea 6 episodes of vomiting, and right posterior headache that radiates to her ear.  She reports that her significant other told her that she had blood in her ear.  She did use a Q-tip yesterday.  She is a smoker and reports associated cough denies fever sweats or chills.    1. Location: 1. Middle ear 2. Posterior H/A  2. Quality: throbbing  3. Severity: moderate  4. Worsening factors: movement  5. Alleviating factors: denies  6. Onset: 1130  7. Radiation: denies  8. Frequency: intermittent  9. Co-morbidities: Past Medical History:  No date: Ankle swelling  No date: Arthritis  No date: Depression  No date: Finger numbness  No date: High cholesterol  No date: History of complete eye exam      Comment:  2016 & 2018 at [facility] in [place]  No date: History of multiple miscarriages  No date: History of varicose veins  No date: Menstrual pain      Comment:  extreme  No date: Migraine headache  No date: Tubal pregnancy  10. Source: patient            Review of Systems   Constitutional: Negative for chills, diaphoresis and fever.   HENT: Positive for ear discharge and ear pain. Negative for facial swelling and rhinorrhea.    Eyes: Negative for photophobia, pain and visual disturbance.   Gastrointestinal: Positive for nausea and vomiting.   Musculoskeletal: Negative for gait problem.   Skin: Negative for color change, pallor, rash and wound.   Neurological: Positive for dizziness and headaches. Negative for seizures, syncope, speech difficulty, weakness and numbness.   Psychiatric/Behavioral: Negative for agitation and confusion.   All other systems reviewed and are negative.      Past Medical History:   Diagnosis Date   • Ankle swelling    • Arthritis    • Depression    • Finger numbness    • High cholesterol    • History of complete eye " exam     2016 & 2018 at [facility] in [place]   • History of multiple miscarriages    • History of varicose veins    • Menstrual pain     extreme   • Migraine headache    • Tubal pregnancy        Allergies   Allergen Reactions   • Aspirin Unknown (See Comments)   • Penicillin G Unknown (See Comments)       Past Surgical History:   Procedure Laterality Date   • OTHER SURGICAL HISTORY      multiple miscarriages last on in 2016   • OTHER SURGICAL HISTORY      s/p right fallopain tube surgery due to tubal pregnancy per [facility]   • TUMOR REMOVAL      scheduled to have3 tumor removed on  02/22/2019       Family History   Problem Relation Age of Onset   • Allergies Mother    • Stroke Mother    • Arthritis Father    • Mental illness Father    • Stroke Father    • Diabetes Maternal Grandfather        Social History     Socioeconomic History   • Marital status: Single     Spouse name: Not on file   • Number of children: Not on file   • Years of education: Not on file   • Highest education level: Not on file   Tobacco Use   • Smoking status: Current Every Day Smoker     Packs/day: 0.50   • Smokeless tobacco: Never Used   Substance and Sexual Activity   • Alcohol use: Yes     Comment: very little   • Drug use: No   • Sexual activity: Defer           Objective   Physical Exam  Vitals signs and nursing note reviewed.   Constitutional:       General: She is awake. She is not in acute distress.     Appearance: Normal appearance. She is well-developed. She is obese. She is not toxic-appearing.   HENT:      Head: Normocephalic and atraumatic. No right periorbital erythema or left periorbital erythema.      Jaw: There is normal jaw occlusion.      Right Ear: Hearing, ear canal and external ear normal. Drainage present. Tympanic membrane is perforated.      Left Ear: Hearing, tympanic membrane, ear canal and external ear normal.  No middle ear effusion. Tympanic membrane is not injected, scarred, perforated, erythematous,  retracted or bulging.      Ears:      Comments: There is a scant amount of fresh blood noted in the right auditory canal with ruptured TM.     Nose: Nose normal. No mucosal edema or rhinorrhea.      Right Sinus: No maxillary sinus tenderness or frontal sinus tenderness.      Left Sinus: No maxillary sinus tenderness or frontal sinus tenderness.      Mouth/Throat:      Lips: Pink. No lesions.      Dentition: Normal dentition. No dental caries or dental abscesses.      Pharynx: Uvula midline. No oropharyngeal exudate or uvula swelling.      Tonsils: 2+ on the right. 2+ on the left.   Eyes:      General: Lids are normal. Vision grossly intact. Gaze aligned appropriately.      Extraocular Movements: Extraocular movements intact.      Conjunctiva/sclera: Conjunctivae normal.      Pupils: Pupils are equal, round, and reactive to light.      Slit lamp exam:     Right eye: No hyphema.      Left eye: No hyphema.   Neck:      Musculoskeletal: Full passive range of motion without pain, normal range of motion and neck supple. Normal range of motion. No pain with movement or spinous process tenderness.      Trachea: Trachea and phonation normal.   Cardiovascular:      Rate and Rhythm: Normal rate and regular rhythm.      Pulses: Normal pulses.           Radial pulses are 2+ on the right side and 2+ on the left side.        Dorsalis pedis pulses are 2+ on the right side and 2+ on the left side.        Posterior tibial pulses are 2+ on the right side and 2+ on the left side.      Heart sounds: Normal heart sounds, S1 normal and S2 normal. Heart sounds not distant. No murmur. No friction rub. No gallop.    Pulmonary:      Effort: Pulmonary effort is normal. No respiratory distress.      Breath sounds: Normal breath sounds. No stridor. No wheezing or rales.   Musculoskeletal: Normal range of motion.      Right lower leg: No edema.      Left lower leg: No edema.   Skin:     General: Skin is warm and dry.      Capillary Refill:  Capillary refill takes less than 2 seconds.      Coloration: Skin is not pale.      Findings: No rash.   Neurological:      General: No focal deficit present.      Mental Status: She is alert and oriented to person, place, and time.      GCS: GCS eye subscore is 4. GCS verbal subscore is 5. GCS motor subscore is 6.      Cranial Nerves: Cranial nerves are intact. No cranial nerve deficit.      Sensory: Sensation is intact. No sensory deficit.      Motor: Motor function is intact. No abnormal muscle tone.      Gait: Gait is intact. Gait normal.      Deep Tendon Reflexes: Reflexes normal.   Psychiatric:         Mood and Affect: Mood normal.         Behavior: Behavior normal. Behavior is cooperative.         Thought Content: Thought content normal.         Judgment: Judgment normal.         Procedures           ED Course      No radiology results for the last day  Medications   neomycin-polymyxin-hydrocortisone (CORTISPORIN) otic suspension 4 drop (has no administration in time range)   Pharmacy Consult - Pharmacy to dose (has no administration in time range)   meclizine (ANTIVERT) tablet 50 mg (50 mg Oral Given 1/7/21 1608)   ondansetron ODT (ZOFRAN-ODT) disintegrating tablet 4 mg (4 mg Oral Given 1/7/21 1608)     Labs Reviewed - No data to display                                       MDM  Number of Diagnoses or Management Options  Dizziness:   Ruptured tympanic membrane, right:   Diagnosis management comments: Chart Review: 8/4/2020 patient was seen for follow-up by Ortho for osteoarthritis.  Comorbidity: Past Medical History:  No date: Ankle swelling  No date: Arthritis  No date: Depression  No date: Finger numbness  No date: High cholesterol  No date: History of complete eye exam      Comment:  2016 & 2018 at [facility] in [place]  No date: History of multiple miscarriages  No date: History of varicose veins  No date: Menstrual pain      Comment:  extreme  No date: Migraine headache  No date: Tubal  "pregnancy  Imaging: Was interpreted by physician and reviewed by myself:    Patient undressed and placed in gown for exam.  Appropriate PPE worn during patient exam. 45-year-old female presents with a 2-month history of her right ear \"being clogged\" she also reports dizziness since 1130 this morning that is worse with movement, nausea 6 episodes of vomiting, and right posterior headache that radiates to her ear.  She reports that her significant other told her that she had blood in her ear.  She did use a Q-tip yesterday.  She is a smoker and reports associated cough denies fever sweats or chills.  There is a scant amount of fresh blood noted in the right auditory canal with ruptured TM. Nothing focal noted on exam.  Offered lab work-up and CT patient declined.  She was given meclizine 50 mg p.o., dispensed Cortisporin drops with a label to use at home.  She was given follow-up with her primary care physician for recheck and referral for ENT if her hearing does not return in 2 weeks.    Disposition/Treatment: Discussed results with patient, verbalized understanding.  Discussed reasons to return to the ER, patient verbalized understanding.  Agreeable with plan of care.  Patient was stable upon discharge.    EMR Dragon transcription disclaimer:  Some of this encounter note is an electronic transcription translation of spoken language to printed text. The electronic translation of spoken language may permit erroneous, or at times, nonsensical words or phrases to be inadvertently transcribed; Although I have reviewed the note for such errors some may still exist.           Patient Progress  Patient progress: stable      Final diagnoses:   Ruptured tympanic membrane, right   Dizziness            Kady Hall NP  01/07/21 0928    "

## 2021-01-13 ENCOUNTER — HOSPITAL ENCOUNTER (EMERGENCY)
Facility: HOSPITAL | Age: 46
Discharge: HOME | End: 2021-01-13
Attending: EMERGENCY MEDICINE | Admitting: EMERGENCY MEDICINE

## 2021-01-13 ENCOUNTER — TELEPHONE (OUTPATIENT)
Dept: FAMILY MEDICINE CLINIC | Facility: CLINIC | Age: 46
End: 2021-01-13

## 2021-01-13 ENCOUNTER — APPOINTMENT (OUTPATIENT)
Dept: GENERAL RADIOLOGY | Facility: HOSPITAL | Age: 46
End: 2021-01-13

## 2021-01-13 VITALS
HEIGHT: 65 IN | BODY MASS INDEX: 31.63 KG/M2 | TEMPERATURE: 98 F | OXYGEN SATURATION: 100 % | RESPIRATION RATE: 16 BRPM | DIASTOLIC BLOOD PRESSURE: 57 MMHG | HEART RATE: 87 BPM | SYSTOLIC BLOOD PRESSURE: 106 MMHG | WEIGHT: 189.82 LBS

## 2021-01-13 DIAGNOSIS — T78.40XA ALLERGIC REACTION, INITIAL ENCOUNTER: ICD-10-CM

## 2021-01-13 DIAGNOSIS — R21 RASH: ICD-10-CM

## 2021-01-13 DIAGNOSIS — M25.50 ARTHRALGIA, UNSPECIFIED JOINT: Primary | ICD-10-CM

## 2021-01-13 LAB
ALBUMIN SERPL-MCNC: 3.5 G/DL (ref 3.5–5.2)
ALBUMIN/GLOB SERPL: 1.2 G/DL
ALP SERPL-CCNC: 184 U/L (ref 39–117)
ALT SERPL W P-5'-P-CCNC: 676 U/L (ref 1–33)
ANION GAP SERPL CALCULATED.3IONS-SCNC: 11 MMOL/L (ref 5–15)
AST SERPL-CCNC: 395 U/L (ref 1–32)
BASOPHILS # BLD AUTO: 0.2 10*3/MM3 (ref 0–0.2)
BASOPHILS NFR BLD AUTO: 1.9 % (ref 0–1.5)
BILIRUB SERPL-MCNC: 0.4 MG/DL (ref 0–1.2)
BUN SERPL-MCNC: 10 MG/DL (ref 6–20)
BUN/CREAT SERPL: 11.4 (ref 7–25)
CALCIUM SPEC-SCNC: 9.4 MG/DL (ref 8.6–10.5)
CHLORIDE SERPL-SCNC: 100 MMOL/L (ref 98–107)
CK SERPL-CCNC: 19 U/L (ref 20–180)
CO2 SERPL-SCNC: 24 MMOL/L (ref 22–29)
CREAT SERPL-MCNC: 0.88 MG/DL (ref 0.57–1)
DEPRECATED RDW RBC AUTO: 44.6 FL (ref 37–54)
EOSINOPHIL # BLD AUTO: 0.6 10*3/MM3 (ref 0–0.4)
EOSINOPHIL NFR BLD AUTO: 7.5 % (ref 0.3–6.2)
ERYTHROCYTE [DISTWIDTH] IN BLOOD BY AUTOMATED COUNT: 17.8 % (ref 12.3–15.4)
GFR SERPL CREATININE-BSD FRML MDRD: 69 ML/MIN/1.73
GLOBULIN UR ELPH-MCNC: 3 GM/DL
GLUCOSE SERPL-MCNC: 135 MG/DL (ref 65–99)
HCT VFR BLD AUTO: 36.3 % (ref 34–46.6)
HGB BLD-MCNC: 11.3 G/DL (ref 12–15.9)
LYMPHOCYTES # BLD AUTO: 1.4 10*3/MM3 (ref 0.7–3.1)
LYMPHOCYTES NFR BLD AUTO: 17.2 % (ref 19.6–45.3)
MCH RBC QN AUTO: 22.3 PG (ref 26.6–33)
MCHC RBC AUTO-ENTMCNC: 31.2 G/DL (ref 31.5–35.7)
MCV RBC AUTO: 71.6 FL (ref 79–97)
MONOCYTES # BLD AUTO: 0.6 10*3/MM3 (ref 0.1–0.9)
MONOCYTES NFR BLD AUTO: 7.8 % (ref 5–12)
NEUTROPHILS NFR BLD AUTO: 5.4 10*3/MM3 (ref 1.7–7)
NEUTROPHILS NFR BLD AUTO: 65.6 % (ref 42.7–76)
NRBC BLD AUTO-RTO: 0.1 /100 WBC (ref 0–0.2)
NT-PROBNP SERPL-MCNC: 19.6 PG/ML (ref 0–450)
PLATELET # BLD AUTO: 295 10*3/MM3 (ref 140–450)
PMV BLD AUTO: 8.5 FL (ref 6–12)
POTASSIUM SERPL-SCNC: 4 MMOL/L (ref 3.5–5.2)
PROT SERPL-MCNC: 6.5 G/DL (ref 6–8.5)
RBC # BLD AUTO: 5.07 10*6/MM3 (ref 3.77–5.28)
SARS-COV-2 RNA PNL SPEC NAA+PROBE: NOT DETECTED
SODIUM SERPL-SCNC: 135 MMOL/L (ref 136–145)
TSH SERPL DL<=0.05 MIU/L-ACNC: 0.55 UIU/ML (ref 0.27–4.2)
WBC # BLD AUTO: 8.2 10*3/MM3 (ref 3.4–10.8)

## 2021-01-13 PROCEDURE — 85025 COMPLETE CBC W/AUTO DIFF WBC: CPT | Performed by: EMERGENCY MEDICINE

## 2021-01-13 PROCEDURE — 82550 ASSAY OF CK (CPK): CPT | Performed by: EMERGENCY MEDICINE

## 2021-01-13 PROCEDURE — 99283 EMERGENCY DEPT VISIT LOW MDM: CPT

## 2021-01-13 PROCEDURE — 71045 X-RAY EXAM CHEST 1 VIEW: CPT

## 2021-01-13 PROCEDURE — 83880 ASSAY OF NATRIURETIC PEPTIDE: CPT | Performed by: EMERGENCY MEDICINE

## 2021-01-13 PROCEDURE — 80053 COMPREHEN METABOLIC PANEL: CPT | Performed by: EMERGENCY MEDICINE

## 2021-01-13 PROCEDURE — 84443 ASSAY THYROID STIM HORMONE: CPT | Performed by: EMERGENCY MEDICINE

## 2021-01-13 PROCEDURE — 87635 SARS-COV-2 COVID-19 AMP PRB: CPT | Performed by: EMERGENCY MEDICINE

## 2021-01-13 RX ORDER — PREDNISONE 50 MG/1
50 TABLET ORAL DAILY
Qty: 5 TABLET | Refills: 0 | Status: SHIPPED | OUTPATIENT
Start: 2021-01-13 | End: 2021-01-28

## 2021-01-13 RX ORDER — OFLOXACIN 3 MG/ML
3 SOLUTION AURICULAR (OTIC) DAILY
Qty: 5 ML | Refills: 0 | Status: SHIPPED | OUTPATIENT
Start: 2021-01-13 | End: 2021-06-28

## 2021-01-13 NOTE — TELEPHONE ENCOUNTER
Caller: Ellen Valladares    Relationship to patient: Self    Best call back number: [phone]    Chief complaint: SWOLLEN HANDS, TINGLING IN HANDS AND RASH, SAID SHE CAN BARELY MAKE A FIST, PATIENT CALLED TO MAKE AN APPOINTMENT, NOTHING AVAILABLE TODAY    Patient directed to call 911 or go to their nearest emergency room.     Patient verbalized understanding: [x] Yes  [] No  If no, why?    Additional notes:NONE

## 2021-01-13 NOTE — ED PROVIDER NOTES
Subjective   Complaint rash on legs swelling and legs and hands with pain    History of present illness 45-year-old female who was seen recently for an ear infection just a few days ago she had been on meclizine and Zofran in the ER.  She was placed on an antibiotic as well as eardrops.  She now complains of a rash on her anterior legs and swelling in her hands and leg.  This is been there for about 3 days.  Seems of gotten worse after she started this medication.  She denies any tongue or throat swelling she has had somewhat of a cough and mild shortness of breath.  Nothing makes it better nothing makes it worse.  She states the rash itches she took Benadryl without relief.  Denies any injury or exposures or foreign travels.          Review of Systems   Constitutional: Negative for chills and fever.   HENT: Negative for congestion and sore throat.    Respiratory: Positive for cough and shortness of breath. Negative for chest tightness.    Cardiovascular: Positive for leg swelling. Negative for chest pain.   Gastrointestinal: Negative for abdominal pain and vomiting.   Skin: Positive for rash. Negative for color change.   Psychiatric/Behavioral: Negative for agitation and behavioral problems.       Past Medical History:   Diagnosis Date   • Ankle swelling    • Arthritis    • Depression    • Finger numbness    • High cholesterol    • History of complete eye exam     2016 & 2018 at [facility] in [place]   • History of multiple miscarriages    • History of varicose veins    • Menstrual pain     extreme   • Migraine headache    • Tubal pregnancy        Allergies   Allergen Reactions   • Aspirin Unknown (See Comments)   • Penicillin G Unknown (See Comments)       Past Surgical History:   Procedure Laterality Date   • OTHER SURGICAL HISTORY      multiple miscarriages last on in 2016   • OTHER SURGICAL HISTORY      s/p right fallopain tube surgery due to tubal pregnancy per [facility]   • TUMOR REMOVAL      scheduled  to have3 tumor removed on  02/22/2019       Family History   Problem Relation Age of Onset   • Allergies Mother    • Stroke Mother    • Arthritis Father    • Mental illness Father    • Stroke Father    • Diabetes Maternal Grandfather        Social History     Socioeconomic History   • Marital status: Single     Spouse name: Not on file   • Number of children: Not on file   • Years of education: Not on file   • Highest education level: Not on file   Tobacco Use   • Smoking status: Current Every Day Smoker     Packs/day: 0.50   • Smokeless tobacco: Never Used   Substance and Sexual Activity   • Alcohol use: Yes     Comment: very little   • Drug use: No   • Sexual activity: Defer       Prior to Admission medications    Medication Sig Start Date End Date Taking? Authorizing Provider   atorvastatin (LIPITOR) 20 MG tablet Take 1 tablet by mouth every night at bedtime. 8/5/20   Diana Knapp MD   Cholecalciferol (VITAMIN D3) 50 MCG (2000 UT) capsule Take 1 capsule by mouth Daily. 4/10/20   Diana Knapp MD   DULoxetine (CYMBALTA) 30 MG capsule Take 1 capsule 2x a day 8/5/20   Diana Knapp MD   famotidine (PEPCID) 40 MG tablet Take 1 tablet by mouth Daily. 8/5/20   Diana Knapp MD   ofloxacin (FLOXIN) 0.3 % otic solution Administer 3 drops to the right ear Daily. 1/13/21   Imtiaz Buitrago MD   predniSONE (DELTASONE) 50 MG tablet Take 1 tablet by mouth Daily. 1/13/21   Imtiaz Buitrago MD   rOPINIRole (REQUIP) 1 MG tablet Take 1 tablet by mouth 2 (Two) Times a Day. Take 1 hour before bedtime. 8/5/20   Diana Knapp MD   cefdinir (OMNICEF) 300 MG capsule Take 1 capsule by mouth 2 (Two) Times a Day for 7 days. 1/7/21 1/13/21  Kady Hall NP   meclizine (ANTIVERT) 25 MG tablet Take 1 tablet by mouth 4 (Four) Times a Day As Needed for Dizziness. 1/7/21 1/13/21  Kady Hall NP   meloxicam (MOBIC) 7.5 MG tablet Take 1 tablet by mouth 2 (Two) Times a Day. 8/5/20 1/13/21  Diana Knapp MD         Objective    Physical Exam  45-year-old female awake alert no acute distress HEENT extraocular muscles intact pupils equal and react there is no photophobia tongue and throat are normal floor the mouth is normal no stridor no drooling no trismus normal voice neck is supple no adenopathy no meningeal sign lungs clear no retraction no use of accessories heart regular without murmur abdomen soft without tenderness no enlarged liver no large spleen.  Extremities pulses are equal throughout upper and lower extremity she has limited edema to her hands and feet there is no palp cords or Homans' sign or evidence of DVT she has some hives on her lower extremities no petechiae no purpura.  She states this itches and is consistent with some recurrence of maculopapular type rash from the knees down.  No cellulitic changes there is bilateral.  I do not see a rash anywhere else she is awake alert she follows commands no facial asymmetry normal speech without focal weakness  Procedures           ED Course      Results for orders placed or performed during the hospital encounter of 01/13/21   COVID-19,[facility] IN-HOUSE,Nasal Swab No Transport Media 3-4 HR TAT - Swab, Nasal Cavity    Specimen: Nasal Cavity; Swab   Result Value Ref Range    COVID19 Not Detected Not Detected - Ref. Range   Comprehensive Metabolic Panel    Specimen: Blood   Result Value Ref Range    Glucose 135 (H) 65 - 99 mg/dL    BUN 10 6 - 20 mg/dL    Creatinine 0.88 0.57 - 1.00 mg/dL    Sodium 135 (L) 136 - 145 mmol/L    Potassium 4.0 3.5 - 5.2 mmol/L    Chloride 100 98 - 107 mmol/L    CO2 24.0 22.0 - 29.0 mmol/L    Calcium 9.4 8.6 - 10.5 mg/dL    Total Protein 6.5 6.0 - 8.5 g/dL    Albumin 3.50 3.50 - 5.20 g/dL    ALT (SGPT) 676 (H) 1 - 33 U/L    AST (SGOT) 395 (H) 1 - 32 U/L    Alkaline Phosphatase 184 (H) 39 - 117 U/L    Total Bilirubin 0.4 0.0 - 1.2 mg/dL    eGFR Non African Amer 69 >60 mL/min/1.73    Globulin 3.0 gm/dL    A/G Ratio 1.2 g/dL    BUN/Creatinine Ratio 11.4  7.0 - 25.0    Anion Gap 11.0 5.0 - 15.0 mmol/L   BNP    Specimen: Blood   Result Value Ref Range    proBNP 19.6 0.0 - 450.0 pg/mL   CK    Specimen: Blood   Result Value Ref Range    Creatine Kinase 19 (L) 20 - 180 U/L   TSH    Specimen: Blood   Result Value Ref Range    TSH 0.553 0.270 - 4.200 uIU/mL   CBC Auto Differential    Specimen: Blood   Result Value Ref Range    WBC 8.20 3.40 - 10.80 10*3/mm3    RBC 5.07 3.77 - 5.28 10*6/mm3    Hemoglobin 11.3 (L) 12.0 - 15.9 g/dL    Hematocrit 36.3 34.0 - 46.6 %    MCV 71.6 (L) 79.0 - 97.0 fL    MCH 22.3 (L) 26.6 - 33.0 pg    MCHC 31.2 (L) 31.5 - 35.7 g/dL    RDW 17.8 (H) 12.3 - 15.4 %    RDW-SD 44.6 37.0 - 54.0 fl    MPV 8.5 6.0 - 12.0 fL    Platelets 295 140 - 450 10*3/mm3    Neutrophil % 65.6 42.7 - 76.0 %    Lymphocyte % 17.2 (L) 19.6 - 45.3 %    Monocyte % 7.8 5.0 - 12.0 %    Eosinophil % 7.5 (H) 0.3 - 6.2 %    Basophil % 1.9 (H) 0.0 - 1.5 %    Neutrophils, Absolute 5.40 1.70 - 7.00 10*3/mm3    Lymphocytes, Absolute 1.40 0.70 - 3.10 10*3/mm3    Monocytes, Absolute 0.60 0.10 - 0.90 10*3/mm3    Eosinophils, Absolute 0.60 (H) 0.00 - 0.40 10*3/mm3    Basophils, Absolute 0.20 0.00 - 0.20 10*3/mm3    nRBC 0.1 0.0 - 0.2 /100 WBC     Xr Chest 1 View    Result Date: 1/13/2021  No acute chest findings.  Electronically Signed By-Christina Dean MD On:1/13/2021 11:34 AM This report was finalized on [id number] by  Christina Dean MD.    Medications - No data to display                                         MDM  Number of Diagnoses or Management Options  Allergic reaction, initial encounter:   Arthralgia, unspecified joint:   Rash:   Diagnosis management comments: Medical decision making.  Patient had the above exam and evaluation she has no distress she is no respiratory stress she actually states that this is better than it was yesterday.  Her COVID-19 was negative CBC electrolytes TSH all unremarkable CK was 19 she does have some elevation of her liver enzymes with an ALT of  676 and AST of 395 and alkaline phosphatase of 184 but bilirubin is only 0.4.  She does have a history of some alcohol use.  Although not for the last few days.  She does not take any of her prescribed medications that she is supposed to be on for blood pressure or cholesterol.  She was here recently and given a dose of meclizine and Zofran and placed on some Cortisporin otic eardrops as well as a cephalosporin antibiotic.  At this point it looks like she is having allergic reaction her liver enzymes are elevated but her bilirubin is normal subtle suspect her itching is from that.  This may be more related to the alcohol though could be several things we will discontinue her current antibiotic and eardrop and place her on Floxin otic eardrops instead.  We will place her on 5 days worth of prednisone and I stressed the importance of follow-up.  I do not see that she needs really be admitted to the hospital for this although it does need work-up for sure.  Her abdomen was soft without tenderness no peritoneal findings there is no pain on exam.  She was made aware of the findings.  We talked about allergic reaction serum sickness and other possibilities.  She will follow-up with her primary care doctor closely and she will also be referred to ENT for allergy testing and further work-up.  She has been referred here for her ear before.  She does have some fluid behind that right ear but there is no significant erythema to the ear there is no pain when you pull on the pinnae or the tragus there is no mastoid tenderness in the left 1 is clear.  The mouth was clear the tongue and throat were normal there is no airway compromise.  We talked about what to return for and she will be discharged home for outpatient follow-up with her primary care doctor for further work-up of the symptoms.  And follow-up of the liver enzymes.       Amount and/or Complexity of Data Reviewed  Clinical lab tests: reviewed  Tests in the radiology  section of CPT®: reviewed        Final diagnoses:   Arthralgia, unspecified joint   Rash   Allergic reaction, initial encounter            Imtiaz Buitrago MD  01/13/21 8058

## 2021-01-13 NOTE — DISCHARGE INSTRUCTIONS
Follow-up as listed above.  Prednisone.  Discontinue the other new medications including the recent eardrops and replaced with Floxin.  Return for tongue or throat swelling increasing shortness of breath increasing or new or worsening symptoms.  Or any progression of symptoms.

## 2021-01-13 NOTE — ED NOTES
Rash to legs that patient reports itches. Reports bilateral leg and hand swelling. Was seen here 5 days ago and given medication here. Given prescription for antibiotics     Stefanie Patino RN  01/13/21 5548

## 2021-01-26 ENCOUNTER — OFFICE VISIT (OUTPATIENT)
Dept: ORTHOPEDIC SURGERY | Facility: CLINIC | Age: 46
End: 2021-01-26

## 2021-01-26 VITALS — HEART RATE: 67 BPM | SYSTOLIC BLOOD PRESSURE: 148 MMHG | DIASTOLIC BLOOD PRESSURE: 85 MMHG

## 2021-01-26 DIAGNOSIS — M17.12 PRIMARY OSTEOARTHRITIS OF LEFT KNEE: Primary | ICD-10-CM

## 2021-01-26 PROCEDURE — 20610 DRAIN/INJ JOINT/BURSA W/O US: CPT | Performed by: ORTHOPAEDIC SURGERY

## 2021-01-26 RX ADMIN — LIDOCAINE HYDROCHLORIDE 3 ML: 10 INJECTION, SOLUTION EPIDURAL; INFILTRATION; INTRACAUDAL; PERINEURAL at 15:59

## 2021-01-26 NOTE — PROGRESS NOTES
Large Joint Arthrocentesis: L knee  Date/Time: 1/26/2021 3:59 PM  Procedure Details  Location: knee - L knee  Needle size: 25 G  Medications administered: 88 mg Hyaluronan 88 MG/4ML; 3 mL lidocaine PF 1% 1 %

## 2021-01-27 RX ORDER — LIDOCAINE HYDROCHLORIDE 10 MG/ML
2 INJECTION, SOLUTION INFILTRATION; PERINEURAL
Status: COMPLETED | OUTPATIENT
Start: 2021-01-27 | End: 2021-01-27

## 2021-01-27 RX ORDER — LIDOCAINE HYDROCHLORIDE 10 MG/ML
3 INJECTION, SOLUTION EPIDURAL; INFILTRATION; INTRACAUDAL; PERINEURAL
Status: COMPLETED | OUTPATIENT
Start: 2021-01-26 | End: 2021-01-26

## 2021-01-27 RX ADMIN — LIDOCAINE HYDROCHLORIDE 2 ML: 10 INJECTION, SOLUTION INFILTRATION; PERINEURAL at 14:16

## 2021-01-27 NOTE — PROGRESS NOTES
INJECTION    Patient: Ellen Valladares    YOB: 1975    MRN: [id number]    Chief Complaints: Bilateral knee pain, left worse than right.    History of Present Illness: Patient returns today for left knee pain.  Her pain is located over the medial and lateral aspect of the joint.  The pain has been progressive in nature and remains intermittent .  Her pain is worsened by kneeling, rising after sitting. There has been improvement in the past with heat, NSAIDS and injections.     This problem is not new to this examiner.     Allergies:   Allergies   Allergen Reactions   • Aspirin Unknown (See Comments)   • Penicillin G Unknown (See Comments)       Medications:   Home Medications:  Current Outpatient Medications on File Prior to Visit   Medication Sig   • atorvastatin (LIPITOR) 20 MG tablet Take 1 tablet by mouth every night at bedtime.   • Cholecalciferol (VITAMIN D3) 50 MCG (2000 UT) capsule Take 1 capsule by mouth Daily.   • DULoxetine (CYMBALTA) 30 MG capsule Take 1 capsule 2x a day   • famotidine (PEPCID) 40 MG tablet Take 1 tablet by mouth Daily.   • ofloxacin (FLOXIN) 0.3 % otic solution Administer 3 drops to the right ear Daily.   • predniSONE (DELTASONE) 50 MG tablet Take 1 tablet by mouth Daily.   • rOPINIRole (REQUIP) 1 MG tablet Take 1 tablet by mouth 2 (Two) Times a Day. Take 1 hour before bedtime.     No current facility-administered medications on file prior to visit.      Current Medications:  Scheduled Meds:  PRN Meds:.    I have reviewed the patient's medical history in detail and updated the computerized patient record.  Review and summarization of old records include:    Past Medical History:   Diagnosis Date   • Ankle swelling    • Arthritis    • Depression    • Finger numbness    • High cholesterol    • History of complete eye exam     2016 & 2018 at [facility] in [place]   • History of multiple miscarriages    • History of varicose veins    • Menstrual pain     extreme   •  "Migraine headache    • Tubal pregnancy      Past Surgical History:   Procedure Laterality Date   • OTHER SURGICAL HISTORY      multiple miscarriages last on in 2016   • OTHER SURGICAL HISTORY      s/p right fallopain tube surgery due to tubal pregnancy per [facility]   • TUMOR REMOVAL      scheduled to have3 tumor removed on  02/22/2019     Social History     Occupational History   • Not on file   Tobacco Use   • Smoking status: Current Every Day Smoker     Packs/day: 0.50   • Smokeless tobacco: Never Used   Substance and Sexual Activity   • Alcohol use: Yes     Comment: very little   • Drug use: No   • Sexual activity: Defer      Social History     Social History Narrative   • Not on file     Family History   Problem Relation Age of Onset   • Allergies Mother    • Stroke Mother    • Arthritis Father    • Mental illness Father    • Stroke Father    • Diabetes Maternal Grandfather        Review of Systems  Constitutional: Negative for appetite change.   HENT: Negative.    Eyes: Negative.    Respiratory: Negative.    Cardiovascular: Negative.    Gastrointestinal: Negative.    Endocrine: Negative.    Genitourinary: Negative.    Musculoskeletal: See details of exam below.  Skin: Negative.    Allergic/Immunologic: Negative.    Hematological: Negative.    Psychiatric/Behavioral: Negative.          Wt Readings from Last 3 Encounters:   01/13/21 86.1 kg (189 lb 13.1 oz)   01/07/21 88.3 kg (194 lb 10.7 oz)   08/04/20 93.4 kg (205 lb 12.8 oz)     Ht Readings from Last 3 Encounters:   01/13/21 165.1 cm (65\")   01/07/21 165.1 cm (65\")   08/04/20 166.4 cm (65.5\")     There is no height or weight on file to calculate BMI.  No height and weight on file for this encounter.  Vitals:    01/26/21 1524   BP: 148/85   Pulse: 67       Physical Exam  Constitutional: Patient is oriented to person, place, and time. Appears well-developed and well-nourished.   HENT:   Head: Normocephalic and atraumatic.   Eyes: Conjunctivae and EOM are " normal. Pupils are equal, round, and reactive to light.   Cardiovascular: Normal rate, regular rhythm, normal heart sounds and intact distal pulses.   Pulmonary/Chest: Effort normal and breath sounds normal.   Musculoskeletal:   See detailed exam below   Neurological: Alert and oriented to person, place, and time. No sensory deficit. Coordination normal.   Skin: Skin is warm and dry. Capillary refill takes less than 2 seconds. No rash noted. No erythema.   Psychiatric: Patient has a normal mood and affect. Her behavior is normal. Judgment and thought content normal.   Nursing note and vitals reviewed.    Musculoskeletal:    Bilateral knee (varus). Patient has crepitus throughout range of motion. Positive patellar grind test. Mild effusion. Lachman is negative. Pivot shift is negative. Anterior and posterior drawer signs are negative. Significant joint line tenderness is noted on the medial aspect of the knee. Patient has a varus orientation of the knee. There is fullness and tenderness in the Popliteal fossa. Mild distention of a Popliteal cyst is noted in this location. Range of motion in flexion is from 0-110 degrees. Neurovascular status is intact.  Dorsalis pedis and posterior tibial artery pulses are palpable. Common peroneal nerve function is well preserved. Patient's gait is cautious and antalgic. Skin and soft tissues are mildly swollen, consistent with synovitis and effusion. The patient has a significant limp with the first few steps after starting the gait cycle. Getting out of a chair takes a lot of effort due to pain on knee flexion.       Diagnostics:      Procedure:  Large Joint Arthrocentesis  Date/Time: 1/27/2021 2:16 PM  Consent given by: patient  Site marked: site marked  Timeout: Immediately prior to procedure a time out was called to verify the correct patient, procedure, equipment, support staff and site/side marked as required   Supporting Documentation  Indications: pain   Procedure  Details  Preparation: Patient was prepped and draped in the usual sterile fashion  Needle size: 25 G  Approach: anteromedial  Medications administered: 2 mL lidocaine 1 %  Patient tolerance: patient tolerated the procedure well with no immediate complications          Assessment:   Diagnoses and all orders for this visit:    1. Primary osteoarthritis of left knee (Primary)  -     Large Joint Arthrocentesis: L knee          Plan:   · Injected patient's left knee joint(s)with Monovisc from an anteromedial approach   · Compression/brace   · Rest, ice, compression, and elevation (RICE) therapy  · OTC Tylenol 500-1000mg by mouth every 6 hours as needed for pain   · Follow up in 6 month(s)    Date of encounter: 01/26/2021   Myron Carranza MD

## 2021-01-28 ENCOUNTER — OFFICE VISIT (OUTPATIENT)
Dept: FAMILY MEDICINE CLINIC | Facility: CLINIC | Age: 46
End: 2021-01-28

## 2021-01-28 VITALS
SYSTOLIC BLOOD PRESSURE: 102 MMHG | HEIGHT: 65 IN | WEIGHT: 195 LBS | OXYGEN SATURATION: 100 % | HEART RATE: 80 BPM | DIASTOLIC BLOOD PRESSURE: 63 MMHG | TEMPERATURE: 97.3 F | RESPIRATION RATE: 18 BRPM | BODY MASS INDEX: 32.49 KG/M2

## 2021-01-28 DIAGNOSIS — Z53.21 PATIENT LEFT WITHOUT BEING SEEN: Primary | ICD-10-CM

## 2021-06-28 ENCOUNTER — OFFICE VISIT (OUTPATIENT)
Dept: FAMILY MEDICINE CLINIC | Facility: CLINIC | Age: 46
End: 2021-06-28

## 2021-06-28 VITALS
HEART RATE: 82 BPM | SYSTOLIC BLOOD PRESSURE: 113 MMHG | RESPIRATION RATE: 18 BRPM | DIASTOLIC BLOOD PRESSURE: 73 MMHG | HEIGHT: 65 IN | OXYGEN SATURATION: 100 % | TEMPERATURE: 98.6 F | WEIGHT: 200 LBS | BODY MASS INDEX: 33.32 KG/M2

## 2021-06-28 DIAGNOSIS — M25.561 ACUTE PAIN OF BOTH KNEES: ICD-10-CM

## 2021-06-28 DIAGNOSIS — Z00.00 PHYSICAL EXAM: Primary | ICD-10-CM

## 2021-06-28 DIAGNOSIS — A59.9 TRICHOMONAS INFECTION: ICD-10-CM

## 2021-06-28 DIAGNOSIS — R79.89 ELEVATED LFTS: ICD-10-CM

## 2021-06-28 DIAGNOSIS — R30.0 DYSURIA: ICD-10-CM

## 2021-06-28 DIAGNOSIS — E55.9 VITAMIN D DEFICIENCY: ICD-10-CM

## 2021-06-28 DIAGNOSIS — R53.82 CHRONIC FATIGUE: ICD-10-CM

## 2021-06-28 DIAGNOSIS — Z12.31 ENCOUNTER FOR SCREENING MAMMOGRAM FOR MALIGNANT NEOPLASM OF BREAST: ICD-10-CM

## 2021-06-28 DIAGNOSIS — K21.9 GASTROESOPHAGEAL REFLUX DISEASE, UNSPECIFIED WHETHER ESOPHAGITIS PRESENT: ICD-10-CM

## 2021-06-28 DIAGNOSIS — R79.89 ABNORMAL CBC: ICD-10-CM

## 2021-06-28 DIAGNOSIS — N89.8 VAGINAL DISCHARGE: ICD-10-CM

## 2021-06-28 DIAGNOSIS — E78.5 DYSLIPIDEMIA: ICD-10-CM

## 2021-06-28 DIAGNOSIS — M25.562 ACUTE PAIN OF BOTH KNEES: ICD-10-CM

## 2021-06-28 DIAGNOSIS — Z12.12 ENCOUNTER FOR COLORECTAL CANCER SCREENING: ICD-10-CM

## 2021-06-28 DIAGNOSIS — Z12.11 ENCOUNTER FOR COLORECTAL CANCER SCREENING: ICD-10-CM

## 2021-06-28 LAB
BILIRUB BLD-MCNC: NEGATIVE MG/DL
CLARITY, POC: ABNORMAL
COLOR UR: YELLOW
GLUCOSE UR STRIP-MCNC: NEGATIVE MG/DL
KETONES UR QL: NEGATIVE
LEUKOCYTE EST, POC: ABNORMAL
NITRITE UR-MCNC: NEGATIVE MG/ML
PH UR: 7 [PH] (ref 5–8)
PROT UR STRIP-MCNC: NEGATIVE MG/DL
RBC # UR STRIP: ABNORMAL /UL
SP GR UR: 1.02 (ref 1–1.03)
TRICHOMONAS, POC: ABNORMAL
UROBILINOGEN UR QL: NORMAL

## 2021-06-28 PROCEDURE — 87899 AGENT NOS ASSAY W/OPTIC: CPT | Performed by: FAMILY MEDICINE

## 2021-06-28 PROCEDURE — 87491 CHLMYD TRACH DNA AMP PROBE: CPT | Performed by: FAMILY MEDICINE

## 2021-06-28 PROCEDURE — 99214 OFFICE O/P EST MOD 30 MIN: CPT | Performed by: FAMILY MEDICINE

## 2021-06-28 PROCEDURE — 87086 URINE CULTURE/COLONY COUNT: CPT | Performed by: FAMILY MEDICINE

## 2021-06-28 PROCEDURE — 87591 N.GONORRHOEAE DNA AMP PROB: CPT | Performed by: FAMILY MEDICINE

## 2021-06-28 RX ORDER — DULOXETIN HYDROCHLORIDE 30 MG/1
CAPSULE, DELAYED RELEASE ORAL
Qty: 60 CAPSULE | Refills: 1 | Status: SHIPPED | OUTPATIENT
Start: 2021-06-28 | End: 2021-08-26

## 2021-06-28 RX ORDER — ROPINIROLE 2 MG/1
2 TABLET, FILM COATED ORAL NIGHTLY
Qty: 30 TABLET | Refills: 2 | Status: SHIPPED | OUTPATIENT
Start: 2021-06-28 | End: 2021-11-01 | Stop reason: SDUPTHER

## 2021-06-28 RX ORDER — FAMOTIDINE 40 MG/1
40 TABLET, FILM COATED ORAL DAILY
Qty: 90 TABLET | Refills: 0 | Status: SHIPPED | OUTPATIENT
Start: 2021-06-28 | End: 2022-01-24

## 2021-06-28 RX ORDER — ACETAMINOPHEN 160 MG
2000 TABLET,DISINTEGRATING ORAL DAILY
Qty: 100 CAPSULE | Refills: 1 | Status: SHIPPED | OUTPATIENT
Start: 2021-06-28 | End: 2021-10-05

## 2021-06-28 RX ORDER — METRONIDAZOLE 500 MG/1
500 TABLET ORAL 3 TIMES DAILY
Qty: 30 TABLET | Refills: 0 | Status: SHIPPED | OUTPATIENT
Start: 2021-06-28 | End: 2021-08-02

## 2021-06-30 LAB
BACTERIA SPEC AEROBE CULT: NORMAL
C TRACH RRNA SPEC QL NAA+PROBE: NEGATIVE
N GONORRHOEA RRNA SPEC QL NAA+PROBE: NEGATIVE

## 2021-07-02 LAB
AGE GDLN ACOG TESTING: NORMAL
CYTOLOGIST CVX/VAG CYTO: ABNORMAL
CYTOLOGY CVX/VAG DOC CYTO: ABNORMAL
CYTOLOGY CVX/VAG DOC THIN PREP: ABNORMAL
DX ICD CODE: ABNORMAL
DX ICD CODE: ABNORMAL
HIV 1 & 2 AB SER-IMP: ABNORMAL
HPV I/H RISK 4 DNA CVX QL PROBE+SIG AMP: NEGATIVE
OTHER STN SPEC: ABNORMAL
PATHOLOGIST CVX/VAG CYTO: ABNORMAL
RECOM F/U CVX/VAG CYTO: ABNORMAL
STAT OF ADQ CVX/VAG CYTO-IMP: ABNORMAL

## 2021-07-06 RX ORDER — ATORVASTATIN CALCIUM 20 MG/1
20 TABLET, FILM COATED ORAL
Qty: 15 TABLET | Refills: 0 | Status: SHIPPED | OUTPATIENT
Start: 2021-07-06 | End: 2021-12-21 | Stop reason: SDUPTHER

## 2021-07-08 ENCOUNTER — TELEPHONE (OUTPATIENT)
Dept: FAMILY MEDICINE CLINIC | Facility: CLINIC | Age: 46
End: 2021-07-08

## 2021-07-08 NOTE — TELEPHONE ENCOUNTER
HUB to share    Pap had mild abnormality,probably related to + trichomonas  Recommend rechecking for trichomonas to be sure it  has cleared  Be sure to tell partner about getting tested  Return for pap recheck in 3 mos    Attempted to call pt - holly, then busy signal  [facility] msg also sent to pt

## 2021-07-11 NOTE — PROGRESS NOTES
Subjective   Ellen Valladares is a 46 y.o. female.     Chief Complaint   Patient presents with   • Annual Exam     vaginal odor, light green /yellowish discharge x 3 wks   • Vaginitis   • Depression       History of Present Illness   Annual exam including pap  Pt has had greenish yellow malodorous vaginal discharge for weeks  Not sexually active for weeks      The following portions of the patient's history were reviewed and updated as appropriate: allergies, current medications, past family history, past medical history, past social history, past surgical history and problem list.    Past Medical History:   Diagnosis Date   • Ankle swelling    • Arthritis    • Arthritis of knee, degenerative     gel injections L knee   • Depression    • Finger numbness    • High cholesterol    • History of complete eye exam     2016 & 2018 at [facility] in [place]   • History of multiple miscarriages    • History of varicose veins    • Menstrual pain     extreme   • Migraine headache    • Tubal pregnancy        Past Surgical History:   Procedure Laterality Date   • OTHER SURGICAL HISTORY      multiple miscarriages last on in 2016   • OTHER SURGICAL HISTORY      s/p right fallopain tube surgery due to tubal pregnancy per [facility]   • TUMOR REMOVAL      scheduled to have3 tumor removed on  02/22/2019       Family History   Problem Relation Age of Onset   • Allergies Mother    • Stroke Mother    • Arthritis Father    • Mental illness Father    • Stroke Father    • Diabetes Maternal Grandfather        Review of Systems   Constitutional: Negative for fatigue, unexpected weight gain and unexpected weight loss.   HENT: Negative for congestion, sinus pressure and sore throat.         Normal smell/taste   Eyes: Negative for blurred vision and visual disturbance.   Respiratory: Negative for cough, shortness of breath and wheezing.    Cardiovascular: Negative for chest pain, palpitations and leg swelling.   Gastrointestinal:  Negative for abdominal pain, constipation, diarrhea, nausea, vomiting, GERD and indigestion.   Genitourinary: Positive for vaginal discharge.   Musculoskeletal: Negative for arthralgias, back pain, gait problem, joint swelling and neck pain.   Skin: Negative for rash, skin lesions and bruise.   Allergic/Immunologic: Negative for environmental allergies.   Neurological: Negative for dizziness, tremors, syncope, weakness, light-headedness, headache and memory problem.   Psychiatric/Behavioral: Negative for sleep disturbance, depressed mood and stress. The patient is not nervous/anxious.        Objective   Physical Exam  Vitals and nursing note reviewed.   Constitutional:       General: She is not in acute distress.     Appearance: She is well-developed. She is obese. She is not diaphoretic.   HENT:      Head: Normocephalic and atraumatic.      Right Ear: External ear normal.      Left Ear: External ear normal.      Nose: Nose normal.   Eyes:      Conjunctiva/sclera: Conjunctivae normal.      Pupils: Pupils are equal, round, and reactive to light.   Neck:      Thyroid: No thyromegaly.   Cardiovascular:      Rate and Rhythm: Normal rate and regular rhythm.      Heart sounds: Normal heart sounds. No murmur heard.   No friction rub. No gallop.    Pulmonary:      Effort: Pulmonary effort is normal.      Breath sounds: Normal breath sounds. No wheezing.   Abdominal:      General: Bowel sounds are normal.      Palpations: Abdomen is soft.      Tenderness: There is no abdominal tenderness.   Genitourinary:     Comments: + discharge on pelvic exam  + trich   O/w normal pap  Musculoskeletal:         General: No tenderness or deformity. Normal range of motion.      Cervical back: Normal range of motion and neck supple.   Lymphadenopathy:      Cervical: No cervical adenopathy.   Skin:     General: Skin is warm and dry.      Capillary Refill: Capillary refill takes less than 2 seconds.      Findings: No rash.   Neurological:       Mental Status: She is alert and oriented to person, place, and time.      Cranial Nerves: No cranial nerve deficit.   Psychiatric:         Behavior: Behavior normal.         Thought Content: Thought content normal.         Judgment: Judgment normal.         Vitals:    06/28/21 1436   BP: 113/73   Pulse: 82   Resp: 18   Temp: 98.6 °F (37 °C)   SpO2: 100%     Body mass index is 33.28 kg/m².    LDL Cholesterol    Date Value Ref Range Status   01/14/2019 114 (H) 0 - 100 mg/dL Final   07/24/2018 175 (H) 0 - 100 mg/dL Final     TSH   Date Value Ref Range Status   01/13/2021 0.553 0.270 - 4.200 uIU/mL Final   12/03/2018 1.36 0.34 - 5.60 uIU/mL Final     Comment:     (SEE BELOW)  NOTE: Results may be falsely decreased if patient taking Biotin       Results for orders placed or performed in visit on 06/28/21   IGP, Aptima HPV, Rfx 16 / 18,45    Specimen: ThinPrep Vial   Result Value Ref Range    Diagnosis Comment (A)     Recommendation: Comment (A)     Specimen adequacy: Comment     Clinician Provided ICD-10: Comment     Performed by: Comment     Electronically signed by: Comment     . .     Pathologist Provided ICD-10: Comment     Note: Comment     Method: Comment     HPV Aptima Negative Negative   POCT urinalysis dipstick, automated    Specimen: Urine   Result Value Ref Range    Color Yellow Yellow, Straw, Dark Yellow, Patt    Clarity, UA Cloudy (A) Clear    Specific Gravity  1.025 1.005 - 1.030    pH, Urine 7.0 5.0 - 8.0    Leukocytes Large (3+) (A) Negative    Nitrite, UA Negative Negative    Protein, POC Negative Negative mg/dL    Glucose, UA Negative Negative, 1000 mg/dL (3+) mg/dL    Ketones, UA Negative Negative    Urobilinogen, UA Normal Normal    Bilirubin Negative Negative    Blood, UA Trace (A) Negative   Chlamydia trachomatis, Neisseria gonorrhoeae, PCR - Urine, Urine, Clean Catch    Specimen: Urine, Clean Catch   Result Value Ref Range    Chlamydia trachomatis, FLORINDA Negative Negative    Neisseria gonorrhoeae,  FLORINDA Negative Negative   IGP,Aptima HPV,Age Gdln    Specimen: ThinPrep Vial   Result Value Ref Range    Age Gdln ACOG Testing 30-65    POCT trichomonas    Specimen: Swab   Result Value Ref Range    Trichomonas, UA POS (POSITIVE)    Urine Culture - Urine, Urine, Clean Catch    Specimen: Urine, Clean Catch   Result Value Ref Range    Urine Culture 25,000 CFU/mL Mixed Annalisa Isolated    Results for orders placed or performed during the hospital encounter of 01/13/21   COVID-19,[facility] IN-HOUSE,Nasal Swab No Transport Media 3-4 HR TAT - Swab, Nasal Cavity    Specimen: Nasal Cavity; Swab   Result Value Ref Range    COVID19 Not Detected Not Detected - Ref. Range   CBC Auto Differential    Specimen: Blood   Result Value Ref Range    WBC 8.20 3.40 - 10.80 10*3/mm3    RBC 5.07 3.77 - 5.28 10*6/mm3    Hemoglobin 11.3 (L) 12.0 - 15.9 g/dL    Hematocrit 36.3 34.0 - 46.6 %    MCV 71.6 (L) 79.0 - 97.0 fL    MCH 22.3 (L) 26.6 - 33.0 pg    MCHC 31.2 (L) 31.5 - 35.7 g/dL    RDW 17.8 (H) 12.3 - 15.4 %    RDW-SD 44.6 37.0 - 54.0 fl    MPV 8.5 6.0 - 12.0 fL    Platelets 295 140 - 450 10*3/mm3    Neutrophil % 65.6 42.7 - 76.0 %    Lymphocyte % 17.2 (L) 19.6 - 45.3 %    Monocyte % 7.8 5.0 - 12.0 %    Eosinophil % 7.5 (H) 0.3 - 6.2 %    Basophil % 1.9 (H) 0.0 - 1.5 %    Neutrophils, Absolute 5.40 1.70 - 7.00 10*3/mm3    Lymphocytes, Absolute 1.40 0.70 - 3.10 10*3/mm3    Monocytes, Absolute 0.60 0.10 - 0.90 10*3/mm3    Eosinophils, Absolute 0.60 (H) 0.00 - 0.40 10*3/mm3    Basophils, Absolute 0.20 0.00 - 0.20 10*3/mm3    nRBC 0.1 0.0 - 0.2 /100 WBC   TSH    Specimen: Blood   Result Value Ref Range    TSH 0.553 0.270 - 4.200 uIU/mL   BNP    Specimen: Blood   Result Value Ref Range    proBNP 19.6 0.0 - 450.0 pg/mL   CK    Specimen: Blood   Result Value Ref Range    Creatine Kinase 19 (L) 20 - 180 U/L   Comprehensive Metabolic Panel    Specimen: Blood   Result Value Ref Range    Glucose 135 (H) 65 - 99 mg/dL    BUN 10 6 - 20 mg/dL     Creatinine 0.88 0.57 - 1.00 mg/dL    Sodium 135 (L) 136 - 145 mmol/L    Potassium 4.0 3.5 - 5.2 mmol/L    Chloride 100 98 - 107 mmol/L    CO2 24.0 22.0 - 29.0 mmol/L    Calcium 9.4 8.6 - 10.5 mg/dL    Total Protein 6.5 6.0 - 8.5 g/dL    Albumin 3.50 3.50 - 5.20 g/dL    ALT (SGPT) 676 (H) 1 - 33 U/L    AST (SGOT) 395 (H) 1 - 32 U/L    Alkaline Phosphatase 184 (H) 39 - 117 U/L    Total Bilirubin 0.4 0.0 - 1.2 mg/dL    eGFR Non African Amer 69 >60 mL/min/1.73    Globulin 3.0 gm/dL    A/G Ratio 1.2 g/dL    BUN/Creatinine Ratio 11.4 7.0 - 25.0    Anion Gap 11.0 5.0 - 15.0 mmol/L   Results for orders placed or performed in visit on 05/23/19    MAMMOGRAPHY   Result Value Ref Range     Mammogram SEE REPORT        Assessment/Plan   Diagnoses and all orders for this visit:    1. Physical exam (Primary)  -     IGP,Aptima HPV,Age Gdln; Future  -     POCT urinalysis dipstick, automated  -     IGP,Aptima HPV,Age Gdln  -     Chlamydia trachomatis, Neisseria gonorrhoeae, PCR - Urine, Urine, Clean Catch; Future  -     Chlamydia trachomatis, Neisseria gonorrhoeae, PCR - Urine, Urine, Clean Catch  -     IGP, Aptima HPV, Rfx 16 / 18,45    2. Dysuria  -     Urine Culture - Urine, Urine, Clean Catch  -     Cancel: Chlamydia trachomatis, Neisseria gonorrhoeae, Trichomonas vaginalis, PCR - Swab, Vagina; Future  -     Chlamydia trachomatis, Neisseria gonorrhoeae, PCR - Urine, Urine, Clean Catch; Future  -     Chlamydia trachomatis, Neisseria gonorrhoeae, PCR - Urine, Urine, Clean Catch    3. Vaginal discharge  -     Cancel: Chlamydia trachomatis, Neisseria gonorrhoeae, Trichomonas vaginalis, PCR - Swab, Vagina; Future  -     POCT trichomonas  -     Cancel: Chlamydia trachomatis, Neisseria gonorrhoeae, PCR - Urine, Urine, Clean Catch  -     Chlamydia trachomatis, Neisseria gonorrhoeae, PCR - Urine, Urine, Clean Catch; Future  -     Chlamydia trachomatis, Neisseria gonorrhoeae, PCR - Urine, Urine, Clean Catch    4. Encounter for screening  mammogram for malignant neoplasm of breast  -     Mammo Screening Digital Tomosynthesis Bilateral With CAD; Future  -     Chlamydia trachomatis, Neisseria gonorrhoeae, PCR - Urine, Urine, Clean Catch; Future  -     Chlamydia trachomatis, Neisseria gonorrhoeae, PCR - Urine, Urine, Clean Catch    5. Gastroesophageal reflux disease, unspecified whether esophagitis present  -     Ambulatory Referral to Gastroenterology  -     Chlamydia trachomatis, Neisseria gonorrhoeae, PCR - Urine, Urine, Clean Catch; Future  -     Chlamydia trachomatis, Neisseria gonorrhoeae, PCR - Urine, Urine, Clean Catch    6. Encounter for colorectal cancer screening  -     Ambulatory Referral to Gastroenterology  -     Chlamydia trachomatis, Neisseria gonorrhoeae, PCR - Urine, Urine, Clean Catch; Future  -     Chlamydia trachomatis, Neisseria gonorrhoeae, PCR - Urine, Urine, Clean Catch    7. Elevated LFTs  -     Comprehensive metabolic panel; Future  -     Chlamydia trachomatis, Neisseria gonorrhoeae, PCR - Urine, Urine, Clean Catch; Future  -     Chlamydia trachomatis, Neisseria gonorrhoeae, PCR - Urine, Urine, Clean Catch    8. Abnormal CBC  -     Cancel: CBC & Differential  -     Cancel: Comprehensive metabolic panel; Future  -     CBC & Differential; Future  -     Chlamydia trachomatis, Neisseria gonorrhoeae, PCR - Urine, Urine, Clean Catch; Future  -     Chlamydia trachomatis, Neisseria gonorrhoeae, PCR - Urine, Urine, Clean Catch    9. Acute pain of both knees  -     famotidine (PEPCID) 40 MG tablet; Take 1 tablet by mouth Daily.  Dispense: 90 tablet; Refill: 0  -     Chlamydia trachomatis, Neisseria gonorrhoeae, PCR - Urine, Urine, Clean Catch; Future  -     Chlamydia trachomatis, Neisseria gonorrhoeae, PCR - Urine, Urine, Clean Catch    10. Vitamin D deficiency  -     Vitamin D 25 hydroxy; Future  -     Chlamydia trachomatis, Neisseria gonorrhoeae, PCR - Urine, Urine, Clean Catch; Future  -     Chlamydia trachomatis, Neisseria  gonorrhoeae, PCR - Urine, Urine, Clean Catch    11. Chronic fatigue  -     Cancel: TSH+Free T4  -     TSH+Free T4; Future  -     Chlamydia trachomatis, Neisseria gonorrhoeae, PCR - Urine, Urine, Clean Catch; Future  -     Chlamydia trachomatis, Neisseria gonorrhoeae, PCR - Urine, Urine, Clean Catch    12. Dyslipidemia  -     Lipid Panel; Future  -     Chlamydia trachomatis, Neisseria gonorrhoeae, PCR - Urine, Urine, Clean Catch; Future  -     Chlamydia trachomatis, Neisseria gonorrhoeae, PCR - Urine, Urine, Clean Catch    Other orders  -     metroNIDAZOLE (Flagyl) 500 MG tablet; Take 1 tablet by mouth 3 (Three) Times a Day.  Dispense: 30 tablet; Refill: 0  -     DULoxetine (CYMBALTA) 30 MG capsule; Take 1 capsule 2x a day  Dispense: 60 capsule; Refill: 1  -     Cholecalciferol (Vitamin D3) 50 MCG (2000 UT) capsule; Take 1 capsule by mouth Daily.  Dispense: 100 capsule; Refill: 1  -     rOPINIRole (REQUIP) 2 MG tablet; Take 1 tablet by mouth Every Night. Take 1 hour before bedtime.  Dispense: 30 tablet; Refill: 2

## 2021-07-27 ENCOUNTER — OFFICE VISIT (OUTPATIENT)
Dept: ORTHOPEDIC SURGERY | Facility: CLINIC | Age: 46
End: 2021-07-27

## 2021-07-27 DIAGNOSIS — M17.11 PRIMARY OSTEOARTHRITIS OF RIGHT KNEE: Primary | ICD-10-CM

## 2021-07-27 DIAGNOSIS — M17.12 PRIMARY OSTEOARTHRITIS OF LEFT KNEE: ICD-10-CM

## 2021-07-27 PROCEDURE — 99213 OFFICE O/P EST LOW 20 MIN: CPT | Performed by: ORTHOPAEDIC SURGERY

## 2021-07-27 NOTE — PROGRESS NOTES
Chief Complaint  Follow-up of the Left Knee and Follow-up of the Right Knee    Subjective    History of Present Illness      Ellen Valladares is a 46 y.o. female who presents to [facility] for bilateral knee pain and discomfort.  History of Present Illness     The patient is having pain and discomfort in both the knees. She has difficulty with ambulation and difficulty going up and down the stairs. The left knee is worse than the right knee. She has difficulty with squatting on the ground. She is also complaining of very significant amount of pain radiating to the proximal tibia. Because of the knee pathology, she has developed plantar fasciitis, which is painful for the patient. She has difficulty with walking with bare feet on a concrete floor. She states that her plantar fasciitis is bothering her considerably. We have discussed about her plantar fascial pain as well. I have discussed about viscosupplementation injections with the patient and we will get insurance approval before we proceed with that form of surgical intervention.    Pain Location: Medial aspect of  BILATERAL knees and left heel  Radiation: none  Quality: sharp, stabbing  Intensity/Severity: moderate to severe  Duration: several months  Progression of symptoms: yes, progressive worsening  Onset quality: gradual   Timing: intermittent  Aggravating Factors: going up and down stairs, squatting  Alleviating Factors: Anti-inflammatory medication.  Previous Episodes: yes  Associated Symptoms: pain, swelling, clicking/popping  ADLs Affected: yes  Previous Treatment: NSAIDs       Objective   Vital Signs:   There were no vitals taken for this visit.    Physical Exam  Physical Exam  Vitals signs and nursing note reviewed.   Constitutional:       Appearance: Normal appearance.   Pulmonary:      Effort: Pulmonary effort is normal.   Skin:     General: Skin is warm and dry.      Capillary Refill: Capillary refill takes less than 2  seconds.   Neurological:      General: No focal deficit present.      Mental Status: He is alert and oriented to person, place, and time. Mental status is at baseline.   Psychiatric:         Mood and Affect: Mood normal.         Behavior: Behavior normal.         Thought Content: Thought content normal.         Judgment: Judgment normal.     Ortho Exam   Bilateral knee (varus). Patient has crepitus throughout range of motion. Positive patellar grind test. Mild effusion. Lachman is negative. Pivot shift is negative. Anterior and posterior drawer signs are negative. Significant joint line tenderness is noted on the medial aspect of the knee. Patient has a varus orientation of the knee. There is fullness and tenderness in the Popliteal fossa. Mild distention of a Popliteal cyst is noted in this location. Range of motion in flexion is from 0-110 degrees. Neurovascular status is intact.  Dorsalis pedis and posterior tibial artery pulses are palpable. Common peroneal nerve function is well preserved. Patient's gait is cautious and antalgic. Skin and soft tissues are mildly swollen, consistent with synovitis and effusion. The patient has a significant limp with the first few steps after starting the gait cycle. Getting out of a chair takes a lot of effort due to pain on knee flexion.     Left Foot: The patient has exquisite pain and tenderness over the calcaneal aspect of the plantar fascia at its origin. Dorsiflexion of the toe causes a lot of pain and discomfort. Patient has pain and discomfort along the medial ray of the plantar fascia. Longitudinal arches of the foot are poorly developed. The hindfoot has a mild amount of valgus. Gigi’s test is positive. Patient has a very tender and painful heel strike during the gait phase.  There are no signs of a stress fracture of the calcaneus. Achilles tendon mechanism is well preserved.       Result Review :   The following data was reviewed by: Myron Carranza MD on  07/27/2021:  Radiologic studies - see below for interpretation  bilateral knee xrays ap/lateralviews were performed at [facility] on 07/27/2021. These images were independently viewed and interpreted by myself, my impression as follows:    bilateral Knee X-Ray  Indication:Pain and discomfort on the medial aspect of both knees.   AP, Lateral views  Findings: Significant narrowing of the medial joint space with with early osteophyte formation.   no bony lesion  Soft tissues within normal limits  within normal limits joint spaces  Hardware appropriately positioned not applicable      yes prior studies available for comparison.    This patient's x-ray report was graded according to the Kellgren and Milton classification.  This took into account the joint space narrowing, osteophyte formation, sclerosis of the distal femur/proximal tibia along with deformity of those bones.  The findings were indicative of K L grade 2.    X-RAY was ordered and reviewed by Myron Carranza MD        Procedures           Assessment   Assessment and Plan    Diagnoses and all orders for this visit:    1. Primary osteoarthritis of right knee (Primary)  -     XR Knee 3 View Bilateral    2. Primary osteoarthritis of left knee  -     XR Knee 3 View Bilateral          Follow Up   · Compression/brace  · Rest, ice, compression, and elevation (RICE) therapy  · Stretching and strengthening exercises  · OTC Tylenol 500-1000mg by mouth every 6 hours as needed for pain   · Follow up in 4 week(s)  • Patient was given instructions and counseling regarding her condition or for health maintenance advice. Please see specific information pulled into the AVS if appropriate.   • Use the supportive wraparound brace around the knee.   • Calcium and vitamin D for bone health.   • Intra-articular viscosupplementation injection. Risks and benefits discussed with the patient at length.   • We will call the patient's insurance company to get approval for the  viscosupplementation injections.   • Nonoperative management of the knee pathology discussed with the patient at this point.   • She will eventually need total knee replacement surgery, but at age 46, I would like to defer all forms of surgical intervention as long as possible    Myron Carranza MD   Date of Encounter: 7/27/2021       EMR Dragon/Transcription disclaimer:  Much of this encounter note is an electronic transcription/translation of spoken language to printed text. The electronic translation of spoken language may permit erroneous, or at times, nonsensical words or phrases to be inadvertently transcribed; Although I have reviewed the note for such errors, some may still exist.     Scribed for Myron Carranza MD by Rachael Sanford.  07/28/21   10:07 EDT    I have personally performed the services described in this document as scribed by the above individual, and it is both accurate and complete.  Myron Carranza MD  7/28/2021  10:30 EDT

## 2021-08-02 ENCOUNTER — PATIENT MESSAGE (OUTPATIENT)
Dept: FAMILY MEDICINE CLINIC | Facility: CLINIC | Age: 46
End: 2021-08-02

## 2021-08-02 ENCOUNTER — OFFICE VISIT (OUTPATIENT)
Dept: FAMILY MEDICINE CLINIC | Facility: CLINIC | Age: 46
End: 2021-08-02

## 2021-08-02 VITALS
TEMPERATURE: 97.3 F | HEIGHT: 65 IN | RESPIRATION RATE: 18 BRPM | DIASTOLIC BLOOD PRESSURE: 72 MMHG | SYSTOLIC BLOOD PRESSURE: 116 MMHG | OXYGEN SATURATION: 99 % | WEIGHT: 194 LBS | BODY MASS INDEX: 32.32 KG/M2 | HEART RATE: 81 BPM

## 2021-08-02 DIAGNOSIS — B75: Primary | ICD-10-CM

## 2021-08-02 DIAGNOSIS — F32.89 OTHER DEPRESSION: ICD-10-CM

## 2021-08-02 DIAGNOSIS — M17.0 BILATERAL PRIMARY OSTEOARTHRITIS OF KNEE: ICD-10-CM

## 2021-08-02 DIAGNOSIS — R79.89 ELEVATED LFTS: ICD-10-CM

## 2021-08-02 DIAGNOSIS — Z12.12 ENCOUNTER FOR COLORECTAL CANCER SCREENING: ICD-10-CM

## 2021-08-02 DIAGNOSIS — F10.10 ALCOHOL ABUSE: ICD-10-CM

## 2021-08-02 DIAGNOSIS — Z79.899 MEDICATION MANAGEMENT: ICD-10-CM

## 2021-08-02 DIAGNOSIS — Z12.11 ENCOUNTER FOR COLORECTAL CANCER SCREENING: ICD-10-CM

## 2021-08-02 PROCEDURE — 99214 OFFICE O/P EST MOD 30 MIN: CPT | Performed by: FAMILY MEDICINE

## 2021-08-02 NOTE — PROGRESS NOTES
Subjective   Ellen Valladares is a 46 y.o. female.     Chief Complaint   Patient presents with   • Follow-up   • Osteoarthritis     needing to discuss pain meds   • need trich rech     on period now       History of Present Illness   Fu Trichomonas treatment, needs recheck  Fu OA of knees, needs pain meds  Fu elevated LFT    The following portions of the patient's history were reviewed and updated as appropriate: allergies, current medications, past family history, past medical history, past social history, past surgical history and problem list.    Past Medical History:   Diagnosis Date   • Ankle swelling    • Anxiety 2017    Death, losing my job and a split up enhanced these   • Arthritis    • Arthritis of knee, degenerative     gel injections L knee   • Depression    • Finger numbness    • Heart murmur At birth    since has gone away   • High cholesterol    • History of complete eye exam     2016 & 2018 at [facility] in [place]   • History of medical problems     Legs and Knees swell and hurt   • History of multiple miscarriages    • History of varicose veins    • HL (hearing loss) Jan 2021    ruptured my rt ear drum   • Menstrual pain     extreme   • Migraine headache    • Osteopenia 2018    Deteriorating joints/bones   • Tubal pregnancy        Past Surgical History:   Procedure Laterality Date   • OTHER SURGICAL HISTORY      multiple miscarriages last on in 2016   • OTHER SURGICAL HISTORY      s/p right fallopain tube surgery due to tubal pregnancy per [facility]   • TONSILLECTOMY  @1980   • TUMOR REMOVAL      scheduled to have3 tumor removed on  02/22/2019       Family History   Problem Relation Age of Onset   • Allergies Mother    • Stroke Mother    • Alcohol abuse Mother    • Anxiety disorder Mother    • Asthma Mother    • Cancer Mother    • COPD Mother    • Depression Mother    • Hyperlipidemia Mother    • Miscarriages / Stillbirths Mother    • Vision loss Mother    • Arthritis Father    • Mental  illness Father    • Stroke Father         Resulted in death   • Depression Father    • Diabetes Maternal Grandfather    • Alcohol abuse Maternal Uncle    • Anxiety disorder Maternal Uncle    • COPD Maternal Uncle    • Depression Maternal Uncle    • Anxiety disorder Maternal Aunt    • COPD Maternal Aunt    • Depression Maternal Aunt    • Drug abuse Maternal Aunt    • Mental illness Maternal Aunt    • Anxiety disorder Maternal Aunt    • Anxiety disorder Brother    • Depression Brother    • Drug abuse Brother    • Early death Brother         Overdose (heroin)   • Anxiety disorder Sister    • Depression Sister    • Anxiety disorder Brother    • Depression Brother    • Drug abuse Brother    • Anxiety disorder Son    • Depression Son    • Anxiety disorder Maternal Aunt    • COPD Paternal Grandmother    • Diabetes Paternal Grandfather        Review of Systems   Constitutional: Negative for fatigue, unexpected weight gain and unexpected weight loss.   HENT: Negative for congestion, sinus pressure and sore throat.    Eyes: Negative for blurred vision and visual disturbance.   Respiratory: Negative for cough, shortness of breath and wheezing.    Cardiovascular: Negative for chest pain, palpitations and leg swelling.   Gastrointestinal: Negative for abdominal pain, constipation, diarrhea, nausea, vomiting, GERD and indigestion.   Musculoskeletal: Positive for arthralgias (knees). Negative for back pain, gait problem, joint swelling and neck pain.   Skin: Negative for rash, skin lesions and bruise.   Allergic/Immunologic: Negative for environmental allergies.   Neurological: Negative for dizziness, tremors, syncope, weakness, light-headedness, headache and memory problem.   Psychiatric/Behavioral: Negative for sleep disturbance, depressed mood and stress. The patient is not nervous/anxious.        Objective   Physical Exam  Vitals and nursing note reviewed.   Constitutional:       General: She is not in acute distress.      Appearance: She is well-developed. She is not diaphoretic.   HENT:      Head: Normocephalic and atraumatic.      Right Ear: External ear normal.      Left Ear: External ear normal.      Nose: Nose normal.   Eyes:      Conjunctiva/sclera: Conjunctivae normal.      Pupils: Pupils are equal, round, and reactive to light.   Neck:      Thyroid: No thyromegaly.   Cardiovascular:      Rate and Rhythm: Normal rate and regular rhythm.      Heart sounds: Normal heart sounds. No murmur heard.   No friction rub. No gallop.    Pulmonary:      Effort: Pulmonary effort is normal.      Breath sounds: Normal breath sounds. No wheezing.   Abdominal:      General: Bowel sounds are normal.      Palpations: Abdomen is soft.      Tenderness: There is no abdominal tenderness.   Musculoskeletal:         General: No tenderness or deformity. Normal range of motion.      Cervical back: Normal range of motion and neck supple.   Lymphadenopathy:      Cervical: No cervical adenopathy.   Skin:     General: Skin is warm and dry.      Capillary Refill: Capillary refill takes less than 2 seconds.      Findings: No rash.   Neurological:      Mental Status: She is alert and oriented to person, place, and time.      Cranial Nerves: No cranial nerve deficit.   Psychiatric:         Behavior: Behavior normal.         Thought Content: Thought content normal.         Judgment: Judgment normal.         Vitals:    08/02/21 1330   BP: 116/72   Pulse: 81   Resp: 18   Temp: 97.3 °F (36.3 °C)   SpO2: 99%     Body mass index is 32.28 kg/m².    LDL Cholesterol    Date Value Ref Range Status   01/14/2019 114 (H) 0 - 100 mg/dL Final   07/24/2018 175 (H) 0 - 100 mg/dL Final     TSH   Date Value Ref Range Status   01/13/2021 0.553 0.270 - 4.200 uIU/mL Final   12/03/2018 1.36 0.34 - 5.60 uIU/mL Final     Comment:     (SEE BELOW)  NOTE: Results may be falsely decreased if patient taking Biotin       Results for orders placed or performed in visit on 06/28/21   IGP,  Aptima HPV, Rfx 16 / 18,45    Specimen: ThinPrep Vial   Result Value Ref Range    Diagnosis Comment (A)     Recommendation: Comment (A)     Specimen adequacy: Comment     Clinician Provided ICD-10: Comment     Performed by: Comment     Electronically signed by: Comment     . .     Pathologist Provided ICD-10: Comment     Note: Comment     Method: Comment     HPV Aptima Negative Negative   POCT urinalysis dipstick, automated    Specimen: Urine   Result Value Ref Range    Color Yellow Yellow, Straw, Dark Yellow, Patt    Clarity, UA Cloudy (A) Clear    Specific Gravity  1.025 1.005 - 1.030    pH, Urine 7.0 5.0 - 8.0    Leukocytes Large (3+) (A) Negative    Nitrite, UA Negative Negative    Protein, POC Negative Negative mg/dL    Glucose, UA Negative Negative, 1000 mg/dL (3+) mg/dL    Ketones, UA Negative Negative    Urobilinogen, UA Normal Normal    Bilirubin Negative Negative    Blood, UA Trace (A) Negative   Chlamydia trachomatis, Neisseria gonorrhoeae, PCR - Urine, Urine, Clean Catch    Specimen: Urine, Clean Catch   Result Value Ref Range    Chlamydia trachomatis, FLORINDA Negative Negative    Neisseria gonorrhoeae, FLORINDA Negative Negative   IGP,Aptima HPV,Age Gdln    Specimen: ThinPrep Vial   Result Value Ref Range    Age Gdln ACOG Testing 30-65    POCT trichomonas    Specimen: Swab   Result Value Ref Range    Trichomonas, UA POS (POSITIVE)    Urine Culture - Urine, Urine, Clean Catch    Specimen: Urine, Clean Catch   Result Value Ref Range    Urine Culture 25,000 CFU/mL Mixed Annalisa Isolated    Results for orders placed or performed during the hospital encounter of 01/13/21   COVID-19,[facility] IN-HOUSE,Nasal Swab No Transport Media 3-4 HR TAT - Swab, Nasal Cavity    Specimen: Nasal Cavity; Swab   Result Value Ref Range    COVID19 Not Detected Not Detected - Ref. Range   CBC Auto Differential    Specimen: Blood   Result Value Ref Range    WBC 8.20 3.40 - 10.80 10*3/mm3    RBC 5.07 3.77 - 5.28 10*6/mm3    Hemoglobin 11.3  (L) 12.0 - 15.9 g/dL    Hematocrit 36.3 34.0 - 46.6 %    MCV 71.6 (L) 79.0 - 97.0 fL    MCH 22.3 (L) 26.6 - 33.0 pg    MCHC 31.2 (L) 31.5 - 35.7 g/dL    RDW 17.8 (H) 12.3 - 15.4 %    RDW-SD 44.6 37.0 - 54.0 fl    MPV 8.5 6.0 - 12.0 fL    Platelets 295 140 - 450 10*3/mm3    Neutrophil % 65.6 42.7 - 76.0 %    Lymphocyte % 17.2 (L) 19.6 - 45.3 %    Monocyte % 7.8 5.0 - 12.0 %    Eosinophil % 7.5 (H) 0.3 - 6.2 %    Basophil % 1.9 (H) 0.0 - 1.5 %    Neutrophils, Absolute 5.40 1.70 - 7.00 10*3/mm3    Lymphocytes, Absolute 1.40 0.70 - 3.10 10*3/mm3    Monocytes, Absolute 0.60 0.10 - 0.90 10*3/mm3    Eosinophils, Absolute 0.60 (H) 0.00 - 0.40 10*3/mm3    Basophils, Absolute 0.20 0.00 - 0.20 10*3/mm3    nRBC 0.1 0.0 - 0.2 /100 WBC   TSH    Specimen: Blood   Result Value Ref Range    TSH 0.553 0.270 - 4.200 uIU/mL   BNP    Specimen: Blood   Result Value Ref Range    proBNP 19.6 0.0 - 450.0 pg/mL   CK    Specimen: Blood   Result Value Ref Range    Creatine Kinase 19 (L) 20 - 180 U/L   Comprehensive Metabolic Panel    Specimen: Blood   Result Value Ref Range    Glucose 135 (H) 65 - 99 mg/dL    BUN 10 6 - 20 mg/dL    Creatinine 0.88 0.57 - 1.00 mg/dL    Sodium 135 (L) 136 - 145 mmol/L    Potassium 4.0 3.5 - 5.2 mmol/L    Chloride 100 98 - 107 mmol/L    CO2 24.0 22.0 - 29.0 mmol/L    Calcium 9.4 8.6 - 10.5 mg/dL    Total Protein 6.5 6.0 - 8.5 g/dL    Albumin 3.50 3.50 - 5.20 g/dL    ALT (SGPT) 676 (H) 1 - 33 U/L    AST (SGOT) 395 (H) 1 - 32 U/L    Alkaline Phosphatase 184 (H) 39 - 117 U/L    Total Bilirubin 0.4 0.0 - 1.2 mg/dL    eGFR Non African Amer 69 >60 mL/min/1.73    Globulin 3.0 gm/dL    A/G Ratio 1.2 g/dL    BUN/Creatinine Ratio 11.4 7.0 - 25.0    Anion Gap 11.0 5.0 - 15.0 mmol/L   Results for orders placed or performed in visit on 05/23/19    MAMMOGRAPHY   Result Value Ref Range     Mammogram SEE REPORT        Assessment/Plan   Diagnoses and all orders for this visit:    1. Trichinellosis infection (Primary)  -      POCT trichomonas    2. Elevated LFTs  -     Comprehensive metabolic panel; Future  -     Ambulatory Referral to Gastroenterology  -     Hepatitis A antibody, total; Future  -     Hepatitis B core antibody, total; Future  -     Hepatitis B surface antibody; Future  -     Hepatitis B surface antigen; Future  -     Hepatitis C antibody; Future    3. Medication management  -     Comprehensive metabolic panel; Future    4. Encounter for colorectal cancer screening  -     Ambulatory Referral to Gastroenterology    5. Bilateral primary osteoarthritis of knee    Other orders  -     Diclofenac Sodium 3 % cream; Apply 1 application topically 3 (Three) Times a Week.  Dispense: 2500 g; Refill: 1      Refer to psych  Fu labs  Refer gi   for colorectal screening

## 2021-08-24 ENCOUNTER — OFFICE VISIT (OUTPATIENT)
Dept: ORTHOPEDIC SURGERY | Facility: CLINIC | Age: 46
End: 2021-08-24

## 2021-08-24 DIAGNOSIS — M17.12 PRIMARY OSTEOARTHRITIS OF LEFT KNEE: Primary | ICD-10-CM

## 2021-08-24 PROCEDURE — 20610 DRAIN/INJ JOINT/BURSA W/O US: CPT | Performed by: ORTHOPAEDIC SURGERY

## 2021-08-24 RX ORDER — BUPROPION HYDROCHLORIDE 150 MG/1
150 TABLET ORAL DAILY
COMMUNITY
Start: 2021-08-11 | End: 2021-12-21

## 2021-08-24 RX ORDER — LIDOCAINE HYDROCHLORIDE 10 MG/ML
2 INJECTION, SOLUTION EPIDURAL; INFILTRATION; INTRACAUDAL; PERINEURAL
Status: COMPLETED | OUTPATIENT
Start: 2021-08-24 | End: 2021-08-24

## 2021-08-24 RX ADMIN — LIDOCAINE HYDROCHLORIDE 2 ML: 10 INJECTION, SOLUTION EPIDURAL; INFILTRATION; INTRACAUDAL; PERINEURAL at 14:07

## 2021-08-24 NOTE — PROGRESS NOTES
INJECTION    Patient: Ellen Valladares    YOB: 1975    MRN: [id number]    Chief Complaint   Patient presents with   • Left Knee - Follow-up       History of Present Illness: Patient returns today for left knee pain.  Her pain is located over the medial aspect of the joint.  The pain has been progressive in nature and remains intermittent .  Her pain is worsened by going up and down stairs, kneeling, rising after sitting. There has been improvement in the past with injections.     This problem is not new to this examiner.     Allergies:   Allergies   Allergen Reactions   • Aspirin Unknown (See Comments)   • Penicillin G Unknown (See Comments)       Medications:   Home Medications:  Current Outpatient Medications on File Prior to Visit   Medication Sig   • atorvastatin (LIPITOR) 20 MG tablet Take 1 tablet by mouth every night at bedtime.   • buPROPion XL (WELLBUTRIN XL) 150 MG 24 hr tablet Take 150 mg by mouth Daily.   • Cholecalciferol (Vitamin D3) 50 MCG (2000 UT) capsule Take 1 capsule by mouth Daily.   • DULoxetine (CYMBALTA) 30 MG capsule Take 1 capsule 2x a day   • famotidine (PEPCID) 40 MG tablet Take 1 tablet by mouth Daily.   • rOPINIRole (REQUIP) 2 MG tablet Take 1 tablet by mouth Every Night. Take 1 hour before bedtime.   • [DISCONTINUED] Diclofenac Sodium 3 % cream Apply 1 application topically 3 (Three) Times a Week.     No current facility-administered medications on file prior to visit.     Current Medications:  Scheduled Meds:  PRN Meds:.    I have reviewed the patient's medical history in detail and updated the computerized patient record.  Review and summarization of old records include:    Past Medical History:   Diagnosis Date   • Ankle swelling    • Anxiety 2017    Death, losing my job and a split up enhanced these   • Arthritis    • Arthritis of knee, degenerative     gel injections L knee   • Depression    • Finger numbness    • Heart murmur At birth    since has gone away   • High  cholesterol    • History of complete eye exam     2016 & 2018 at [facility] in [place]   • History of medical problems     Legs and Knees swell and hurt   • History of multiple miscarriages    • History of varicose veins    • HL (hearing loss) Jan 2021    ruptured my rt ear drum   • Menstrual pain     extreme   • Migraine headache    • Osteopenia 2018    Deteriorating joints/bones   • Tubal pregnancy      Past Surgical History:   Procedure Laterality Date   • OTHER SURGICAL HISTORY      multiple miscarriages last on in 2016   • OTHER SURGICAL HISTORY      s/p right fallopain tube surgery due to tubal pregnancy per [facility]   • TONSILLECTOMY  @1980   • TUMOR REMOVAL      scheduled to have3 tumor removed on  02/22/2019     Social History     Occupational History   • Not on file   Tobacco Use   • Smoking status: Current Every Day Smoker     Packs/day: 0.50     Years: 35.00     Pack years: 17.50     Types: Cigarettes     Start date: 1/20/1985   • Smokeless tobacco: Never Used   Substance and Sexual Activity   • Alcohol use: Yes     Alcohol/week: 5.0 standard drinks     Types: 5 Standard drinks or equivalent per week     Comment: very little   • Drug use: No   • Sexual activity: Not Currently     Partners: Male     Birth control/protection: None      Social History     Social History Narrative   • Not on file     Family History   Problem Relation Age of Onset   • Allergies Mother    • Stroke Mother    • Alcohol abuse Mother    • Anxiety disorder Mother    • Asthma Mother    • Cancer Mother    • COPD Mother    • Depression Mother    • Hyperlipidemia Mother    • Miscarriages / Stillbirths Mother    • Vision loss Mother    • Arthritis Father    • Mental illness Father    • Stroke Father         Resulted in death   • Depression Father    • Diabetes Maternal Grandfather    • Alcohol abuse Maternal Uncle    • Anxiety disorder Maternal Uncle    • COPD Maternal Uncle    • Depression Maternal Uncle    • Anxiety disorder  "Maternal Aunt    • COPD Maternal Aunt    • Depression Maternal Aunt    • Drug abuse Maternal Aunt    • Mental illness Maternal Aunt    • Anxiety disorder Maternal Aunt    • Anxiety disorder Brother    • Depression Brother    • Drug abuse Brother    • Early death Brother         Overdose (heroin)   • Anxiety disorder Sister    • Depression Sister    • Anxiety disorder Brother    • Depression Brother    • Drug abuse Brother    • Anxiety disorder Son    • Depression Son    • Anxiety disorder Maternal Aunt    • COPD Paternal Grandmother    • Diabetes Paternal Grandfather        Review of Systems        Wt Readings from Last 3 Encounters:   08/02/21 88 kg (194 lb)   06/28/21 90.7 kg (200 lb)   01/28/21 88.5 kg (195 lb)     Ht Readings from Last 3 Encounters:   08/02/21 165.1 cm (65\")   06/28/21 165.1 cm (65\")   01/28/21 165.1 cm (65\")     There is no height or weight on file to calculate BMI.  No height and weight on file for this encounter.  There were no vitals filed for this visit.    Physical Exam    Musculoskeletal:    Left knee (varus). Patient has crepitus throughout range of motion. Positive patellar grind test. Mild effusion. Lachman is negative. Pivot shift is negative. Anterior and posterior drawer signs are negative. Significant joint line tenderness is noted on the medial aspect of the knee. Patient has a varus orientation of the knee. There is fullness and tenderness in the Popliteal fossa. Mild distention of a Popliteal cyst is noted in this location. Range of motion in flexion is from 0-120 degrees. Neurovascular status is intact.  Dorsalis pedis and posterior tibial artery pulses are palpable. Common peroneal nerve function is well preserved. Patient's gait is cautious and antalgic. Skin and soft tissues are mildly swollen, consistent with synovitis and effusion. The patient has a significant limp with the first few steps after starting the gait cycle. Getting out of a chair takes a lot of effort due to " pain on knee flexion.       Diagnostics:      Procedure:  Large Joint Arthrocentesis: L knee  Date/Time: 8/24/2021 2:07 PM  Consent given by: patient  Site marked: site marked  Timeout: Immediately prior to procedure a time out was called to verify the correct patient, procedure, equipment, support staff and site/side marked as required   Supporting Documentation  Indications: pain   Procedure Details  Location: knee - L knee  Preparation: Patient was prepped and draped in the usual sterile fashion  Needle size: 25 G  Approach: anteromedial  Medications administered: 2 mL lidocaine PF 1% 1 %; 88 mg Hyaluronan 88 MG/4ML  Patient tolerance: patient tolerated the procedure well with no immediate complications            Assessment:   Diagnoses and all orders for this visit:    1. Primary osteoarthritis of left knee (Primary)    Other orders  -     Large Joint Arthrocentesis: L knee          Plan:   · Injected patient's left knee joint(s)with Monovisc from an anteromedial approach   · Compression/brace   · Rest, ice, compression, and elevation (RICE) therapy  · OTC Ibuprofen 600mg by mouth every 6-8 hours as needed for pain and swelling  · Follow up in 6 month(s)    Date of encounter: 08/24/2021   Myron Carranza MD

## 2021-08-26 RX ORDER — DULOXETIN HYDROCHLORIDE 30 MG/1
CAPSULE, DELAYED RELEASE ORAL
Qty: 60 CAPSULE | Refills: 1 | Status: SHIPPED | OUTPATIENT
Start: 2021-08-26 | End: 2021-11-01

## 2021-08-26 NOTE — TELEPHONE ENCOUNTER
Rx Refill Note  Requested Prescriptions     Pending Prescriptions Disp Refills   • DULoxetine (CYMBALTA) 30 MG capsule [Pharmacy Med Name: duloxetine 30 mg capsule,delayed release] 60 capsule 1     Sig: Take 1 capsule 2x a day      Last office visit with prescribing clinician: 8/2/2021      Next office visit with prescribing clinician: 11/1/2021     Lipid Panel (01/14/2019 10:37)  Comprehensive Metabolic Panel (01/13/2021 10:44)         Jagruti Garcia CMA  08/26/21, 10:13 EDT

## 2021-10-04 NOTE — TELEPHONE ENCOUNTER
Rx Refill Note  Requested Prescriptions     Pending Prescriptions Disp Refills   • Cholecalciferol (Vitamin D3) 50 MCG (2000 UT) capsule [Pharmacy Med Name: cholecalciferol (vitamin D3) 50 mcg (2,000 unit) capsule] 100 capsule 0     Sig: Take 1 capsule by mouth Daily.      Last office visit with prescribing clinician: 8/2/2021      Next office visit with prescribing clinician: 11/1/2021     Vitamin D 25 Hydroxy (01/14/2019 10:37)         Elvi Moreno, RT  10/04/21, 10:37 EDT

## 2021-10-05 RX ORDER — ACETAMINOPHEN 160 MG
2000 TABLET,DISINTEGRATING ORAL DAILY
Qty: 100 CAPSULE | Refills: 0 | Status: SHIPPED | OUTPATIENT
Start: 2021-10-05 | End: 2022-08-16

## 2021-11-01 ENCOUNTER — OFFICE VISIT (OUTPATIENT)
Dept: FAMILY MEDICINE CLINIC | Facility: CLINIC | Age: 46
End: 2021-11-01

## 2021-11-01 ENCOUNTER — TELEPHONE (OUTPATIENT)
Dept: FAMILY MEDICINE CLINIC | Facility: CLINIC | Age: 46
End: 2021-11-01

## 2021-11-01 VITALS
DIASTOLIC BLOOD PRESSURE: 70 MMHG | TEMPERATURE: 97.1 F | SYSTOLIC BLOOD PRESSURE: 104 MMHG | HEIGHT: 65 IN | BODY MASS INDEX: 31.32 KG/M2 | RESPIRATION RATE: 18 BRPM | WEIGHT: 188 LBS | OXYGEN SATURATION: 100 % | HEART RATE: 78 BPM

## 2021-11-01 DIAGNOSIS — E55.9 VITAMIN D DEFICIENCY: ICD-10-CM

## 2021-11-01 DIAGNOSIS — R53.82 CHRONIC FATIGUE: ICD-10-CM

## 2021-11-01 DIAGNOSIS — M72.2 PLANTAR FASCIITIS, BILATERAL: ICD-10-CM

## 2021-11-01 DIAGNOSIS — R79.89 ELEVATED LFTS: ICD-10-CM

## 2021-11-01 DIAGNOSIS — F17.200 TOBACCO USE DISORDER: ICD-10-CM

## 2021-11-01 DIAGNOSIS — R79.89 ABNORMAL CBC: ICD-10-CM

## 2021-11-01 DIAGNOSIS — Z79.899 MEDICATION MANAGEMENT: ICD-10-CM

## 2021-11-01 DIAGNOSIS — E78.5 DYSLIPIDEMIA: ICD-10-CM

## 2021-11-01 DIAGNOSIS — M17.0 PRIMARY OSTEOARTHRITIS OF BOTH KNEES: Primary | ICD-10-CM

## 2021-11-01 PROCEDURE — 86708 HEPATITIS A ANTIBODY: CPT | Performed by: FAMILY MEDICINE

## 2021-11-01 PROCEDURE — 82306 VITAMIN D 25 HYDROXY: CPT | Performed by: FAMILY MEDICINE

## 2021-11-01 PROCEDURE — 87661 TRICHOMONAS VAGINALIS AMPLIF: CPT | Performed by: FAMILY MEDICINE

## 2021-11-01 PROCEDURE — 80050 GENERAL HEALTH PANEL: CPT | Performed by: FAMILY MEDICINE

## 2021-11-01 PROCEDURE — 87340 HEPATITIS B SURFACE AG IA: CPT | Performed by: FAMILY MEDICINE

## 2021-11-01 PROCEDURE — 84439 ASSAY OF FREE THYROXINE: CPT | Performed by: FAMILY MEDICINE

## 2021-11-01 PROCEDURE — 86706 HEP B SURFACE ANTIBODY: CPT | Performed by: FAMILY MEDICINE

## 2021-11-01 PROCEDURE — 99214 OFFICE O/P EST MOD 30 MIN: CPT | Performed by: FAMILY MEDICINE

## 2021-11-01 PROCEDURE — 80061 LIPID PANEL: CPT | Performed by: FAMILY MEDICINE

## 2021-11-01 PROCEDURE — 86704 HEP B CORE ANTIBODY TOTAL: CPT | Performed by: FAMILY MEDICINE

## 2021-11-01 PROCEDURE — 86803 HEPATITIS C AB TEST: CPT | Performed by: FAMILY MEDICINE

## 2021-11-01 RX ORDER — ROPINIROLE 4 MG/1
4 TABLET, FILM COATED ORAL NIGHTLY
Qty: 30 TABLET | Refills: 2 | Status: SHIPPED | OUTPATIENT
Start: 2021-11-01 | End: 2022-08-16

## 2021-11-01 RX ORDER — CELECOXIB 100 MG/1
100 CAPSULE ORAL 2 TIMES DAILY PRN
Qty: 60 CAPSULE | Refills: 2 | Status: SHIPPED | OUTPATIENT
Start: 2021-11-01 | End: 2022-08-16

## 2021-11-01 RX ORDER — DULOXETIN HYDROCHLORIDE 30 MG/1
90 CAPSULE, DELAYED RELEASE ORAL DAILY
Qty: 90 CAPSULE | Refills: 2 | Status: SHIPPED | OUTPATIENT
Start: 2021-11-01 | End: 2021-11-07

## 2021-11-01 NOTE — PATIENT INSTRUCTIONS
Plantar Fasciitis    Plantar fasciitis is a painful foot condition that affects the heel. It occurs when the band of tissue that connects the toes to the heel bone (plantar fascia) becomes irritated. This can happen as the result of exercising too much or doing other repetitive activities (overuse injury).  Plantar fasciitis can cause mild irritation to severe pain that makes it difficult to walk or move. The pain is usually worse in the morning after sleeping, or after sitting or lying down for a period of time. Pain may also be worse after long periods of walking or standing.  What are the causes?  This condition may be caused by:  · Standing for long periods of time.  · Wearing shoes that do not have good arch support.  · Doing activities that put stress on joints (high-impact activities). This includes ballet and exercise that makes your heart beat faster (aerobic exercise), such as running.  · Being overweight.  · An abnormal way of walking (gait).  · Tight muscles in the back of your lower leg (calf).  · High arches in your feet or flat feet.  · Starting a new athletic activity.  What are the signs or symptoms?  The main symptom of this condition is heel pain. Pain may get worse after the following:  · Taking the first steps after a time of rest, especially in the morning after awakening, or after you have been sitting or lying down for a while.  · Long periods of standing still.  Pain may decrease after 30-45 minutes of activity, such as gentle walking.  How is this diagnosed?  This condition may be diagnosed based on your medical history, a physical exam, and your symptoms. Your health care provider will check for:  · A tender area on the bottom of your foot.  · A high arch in your foot or flat feet.  · Pain when you move your foot.  · Difficulty moving your foot.  You may have imaging tests to confirm the diagnosis, such as:  · X-rays.  · Ultrasound.  · MRI.  How is this treated?  Treatment for plantar  fasciitis depends on how severe your condition is. Treatment may include:  · Rest, ice, pressure (compression), and raising (elevating) the affected foot. This is called RICE therapy. Your health care provider may recommend RICE therapy along with over-the-counter pain medicines to manage your pain.  · Exercises to stretch your calves and your plantar fascia.  · A splint that holds your foot in a stretched, upward position while you sleep (night splint).  · Physical therapy to relieve symptoms and prevent problems in the future.  · Injections of steroid medicine (cortisone) to relieve pain and inflammation.  · Stimulating your plantar fascia with electrical impulses (extracorporeal shock wave therapy). This is usually the last treatment option before surgery.  · Surgery, if other treatments have not worked after 12 months.  Follow these instructions at home:  Managing pain, stiffness, and swelling    · If directed, put ice on the painful area. To do this:  ? Put ice in a plastic bag, or use a frozen bottle of water.  ? Place a towel between your skin and the bag or bottle.  ? Roll the bottom of your foot over the bag or bottle.  ? Do this for 20 minutes, 2-3 times a day.  · Wear athletic shoes that have air-sole or gel-sole cushions, or try soft shoe inserts that are designed for plantar fasciitis.  · Elevate your foot above the level of your heart while you are sitting or lying down.    Activity  · Avoid activities that cause pain. Ask your health care provider what activities are safe for you.  · Do physical therapy exercises and stretches as told by your health care provider.  · Try activities and forms of exercise that are easier on your joints (low impact). Examples include swimming, water aerobics, and biking.  General instructions  · Take over-the-counter and prescription medicines only as told by your health care provider.  · Wear a night splint while sleeping, if told by your health care provider. Loosen  the splint if your toes tingle, become numb, or turn cold and blue.  · Maintain a healthy weight, or work with your health care provider to lose weight as needed.  · Keep all follow-up visits. This is important.  Contact a health care provider if you have:  · Symptoms that do not go away with home treatment.  · Pain that gets worse.  · Pain that affects your ability to move or do daily activities.  Summary  · Plantar fasciitis is a painful foot condition that affects the heel. It occurs when the band of tissue that connects the toes to the heel bone (plantar fascia) becomes irritated.  · Heel pain is the main symptom of this condition. It may get worse after exercising too much or standing still for a long time.  · Treatment varies, but it usually starts with rest, ice, pressure (compression), and raising (elevating) the affected foot. This is called RICE therapy. Over-the-counter medicines can also be used to manage pain.  This information is not intended to replace advice given to you by your health care provider. Make sure you discuss any questions you have with your health care provider.  Document Revised: 04/05/2021 Document Reviewed: 04/05/2021  Elsevier Patient Education © 2021 [employer]

## 2021-11-01 NOTE — TELEPHONE ENCOUNTER
HUB to read.  PA was sent for Duloxetine HCl 30MG dr capsules.  Waiting on the outcome from insurance.

## 2021-11-02 LAB
25(OH)D3 SERPL-MCNC: 32.1 NG/ML (ref 30–100)
ALBUMIN SERPL-MCNC: 4.2 G/DL (ref 3.5–5.2)
ALBUMIN/GLOB SERPL: 1.3 G/DL
ALP SERPL-CCNC: 68 U/L (ref 39–117)
ALT SERPL W P-5'-P-CCNC: 15 U/L (ref 1–33)
ANION GAP SERPL CALCULATED.3IONS-SCNC: 7.2 MMOL/L (ref 5–15)
AST SERPL-CCNC: 20 U/L (ref 1–32)
BASOPHILS # BLD AUTO: 0.12 10*3/MM3 (ref 0–0.2)
BASOPHILS NFR BLD AUTO: 1.1 % (ref 0–1.5)
BILIRUB SERPL-MCNC: 0.3 MG/DL (ref 0–1.2)
BUN SERPL-MCNC: 9 MG/DL (ref 6–20)
BUN/CREAT SERPL: 12.7 (ref 7–25)
CALCIUM SPEC-SCNC: 9.7 MG/DL (ref 8.6–10.5)
CHLORIDE SERPL-SCNC: 101 MMOL/L (ref 98–107)
CHOLEST SERPL-MCNC: 227 MG/DL (ref 0–200)
CO2 SERPL-SCNC: 28.8 MMOL/L (ref 22–29)
CREAT SERPL-MCNC: 0.71 MG/DL (ref 0.57–1)
DEPRECATED RDW RBC AUTO: 42.7 FL (ref 37–54)
EOSINOPHIL # BLD AUTO: 0.52 10*3/MM3 (ref 0–0.4)
EOSINOPHIL NFR BLD AUTO: 4.6 % (ref 0.3–6.2)
ERYTHROCYTE [DISTWIDTH] IN BLOOD BY AUTOMATED COUNT: 15.4 % (ref 12.3–15.4)
GFR SERPL CREATININE-BSD FRML MDRD: 88 ML/MIN/1.73
GLOBULIN UR ELPH-MCNC: 3.2 GM/DL
GLUCOSE SERPL-MCNC: 92 MG/DL (ref 65–99)
HAV AB SER QL IA: NEGATIVE
HBV CORE AB SERPL QL IA: POSITIVE
HBV SURFACE AB SER RIA-ACNC: REACTIVE
HBV SURFACE AG SERPL QL IA: NORMAL
HCT VFR BLD AUTO: 42 % (ref 34–46.6)
HCV AB SER DONR QL: NORMAL
HDLC SERPL-MCNC: 49 MG/DL (ref 40–60)
HGB BLD-MCNC: 12.3 G/DL (ref 12–15.9)
IMM GRANULOCYTES # BLD AUTO: 0.04 10*3/MM3 (ref 0–0.05)
IMM GRANULOCYTES NFR BLD AUTO: 0.4 % (ref 0–0.5)
LDLC SERPL CALC-MCNC: 146 MG/DL (ref 0–100)
LDLC/HDLC SERPL: 2.91 {RATIO}
LYMPHOCYTES # BLD AUTO: 3.46 10*3/MM3 (ref 0.7–3.1)
LYMPHOCYTES NFR BLD AUTO: 30.3 % (ref 19.6–45.3)
MCH RBC QN AUTO: 22.8 PG (ref 26.6–33)
MCHC RBC AUTO-ENTMCNC: 29.3 G/DL (ref 31.5–35.7)
MCV RBC AUTO: 77.9 FL (ref 79–97)
MONOCYTES # BLD AUTO: 0.65 10*3/MM3 (ref 0.1–0.9)
MONOCYTES NFR BLD AUTO: 5.7 % (ref 5–12)
NEUTROPHILS NFR BLD AUTO: 57.9 % (ref 42.7–76)
NEUTROPHILS NFR BLD AUTO: 6.63 10*3/MM3 (ref 1.7–7)
NRBC BLD AUTO-RTO: 0 /100 WBC (ref 0–0.2)
PLATELET # BLD AUTO: 415 10*3/MM3 (ref 140–450)
PMV BLD AUTO: 11.3 FL (ref 6–12)
POTASSIUM SERPL-SCNC: 4.2 MMOL/L (ref 3.5–5.2)
PROT SERPL-MCNC: 7.4 G/DL (ref 6–8.5)
RBC # BLD AUTO: 5.39 10*6/MM3 (ref 3.77–5.28)
SODIUM SERPL-SCNC: 137 MMOL/L (ref 136–145)
T4 FREE SERPL-MCNC: 1.05 NG/DL (ref 0.93–1.7)
TRIGL SERPL-MCNC: 178 MG/DL (ref 0–150)
TSH SERPL DL<=0.05 MIU/L-ACNC: 0.82 UIU/ML (ref 0.27–4.2)
VLDLC SERPL-MCNC: 32 MG/DL (ref 5–40)
WBC # BLD AUTO: 11.42 10*3/MM3 (ref 3.4–10.8)

## 2021-11-03 LAB — T VAGINALIS DNA SPEC QL NAA+PROBE: NEGATIVE

## 2021-11-04 NOTE — TELEPHONE ENCOUNTER
HUB to read.  PA for Duloxetine 30mg TID was denied.    This request has not been approved. Based on the information submitted for review, you did not meet our guideline rules for the requested drug. In order for your request to be approved, your provider would need to show that you have met the guideline rules below. The details below are written in medical language. If you have questions, please contact your provider. In some cases, the requested medication or alternatives offered may have additional approval requirements. Your provider requested duloxetine 30mg delayed-release capsule with a quantity of 90 capsules per 30 days (3 capsules per day) for the treatment of depression (persistently low mood or loss of interest in activities), but your plan allows for a quantity of 60 capsules per 30 days (2 capsules per day). This is why your quantity request has not been approved. Please consider an alternate dose, dosing schedule, medication strength, or medication for the treatment of your condition. Our guideline named SSRI/SNRI/NRI/BUPROPION (SELECTIVE SEROTONIN REUPTAKE INHIBITORS / SEROTONIN AND NOREPINEPHRINE REUPTAKE INHIBITORS / NOREPINEPHRINE REUPTAKE INHIBITORS / BUPROPION) (reviewed for DULOXETINE 30mg delayed-release capsule) does not allow the use of the requested medication at the requested dose/regimen. Please consider an alternate dose or dosing schedule. A written notification letter will follow with additional details.

## 2021-11-27 NOTE — PROGRESS NOTES
Subjective   Ellen Valladares is a 46 y.o. female.     Chief Complaint   Patient presents with   • Follow-up   • Needs labs done today       History of Present Illness   Fu anxiety/depression, HLD, Obesity, arthrtitis  Reports stable on meds and needs refills  C/o heel and and knee pain  The following portions of the patient's history were reviewed and updated as appropriate: allergies, current medications, past family history, past medical history, past social history, past surgical history and problem list.    Past Medical History:   Diagnosis Date   • Ankle swelling    • Anxiety 2017    Death, losing my job and a split up enhanced these   • Arthritis    • Arthritis of knee, degenerative     gel injections L knee   • Depression    • Finger numbness    • Heart murmur At birth    since has gone away   • High cholesterol    • History of complete eye exam     2016 & 2018 at [facility] in [place]   • History of medical problems     Legs and Knees swell and hurt   • History of multiple miscarriages    • History of varicose veins    • HL (hearing loss) Jan 2021    ruptured my rt ear drum   • Menstrual pain     extreme   • Migraine headache    • Osteopenia 2018    Deteriorating joints/bones   • Tubal pregnancy        Past Surgical History:   Procedure Laterality Date   • OTHER SURGICAL HISTORY      multiple miscarriages last on in 2016   • OTHER SURGICAL HISTORY      s/p right fallopain tube surgery due to tubal pregnancy per [facility]   • TONSILLECTOMY  @1980   • TUMOR REMOVAL      scheduled to have3 tumor removed on  02/22/2019       Family History   Problem Relation Age of Onset   • Allergies Mother    • Stroke Mother    • Alcohol abuse Mother    • Anxiety disorder Mother    • Asthma Mother    • Cancer Mother    • COPD Mother    • Depression Mother    • Hyperlipidemia Mother    • Miscarriages / Stillbirths Mother    • Vision loss Mother    • Arthritis Father    • Mental illness Father    • Stroke Father          Resulted in death   • Depression Father    • Diabetes Maternal Grandfather    • Alcohol abuse Maternal Uncle    • Anxiety disorder Maternal Uncle    • COPD Maternal Uncle    • Depression Maternal Uncle    • Anxiety disorder Maternal Aunt    • COPD Maternal Aunt    • Depression Maternal Aunt    • Drug abuse Maternal Aunt    • Mental illness Maternal Aunt    • Anxiety disorder Maternal Aunt    • Anxiety disorder Brother    • Depression Brother    • Drug abuse Brother    • Early death Brother         Overdose (heroin)   • Anxiety disorder Sister    • Depression Sister    • Anxiety disorder Brother    • Depression Brother    • Drug abuse Brother    • Anxiety disorder Son    • Depression Son    • Anxiety disorder Maternal Aunt    • COPD Paternal Grandmother    • Diabetes Paternal Grandfather        Review of Systems   Constitutional: Negative for fatigue, unexpected weight gain and unexpected weight loss.   HENT: Negative for congestion, sinus pressure and sore throat.         Normal smell/taste   Eyes: Negative for blurred vision and visual disturbance.   Respiratory: Negative for cough, shortness of breath and wheezing.    Cardiovascular: Negative for chest pain, palpitations and leg swelling.   Gastrointestinal: Negative for abdominal pain, constipation, diarrhea, nausea, vomiting, GERD and indigestion.   Musculoskeletal: Negative for arthralgias (knees and feet), back pain, gait problem, joint swelling and neck pain.   Skin: Negative for rash, skin lesions and bruise.   Allergic/Immunologic: Negative for environmental allergies.   Neurological: Negative for dizziness, tremors, syncope, weakness, light-headedness, headache and memory problem.   Psychiatric/Behavioral: Negative for sleep disturbance, depressed mood and stress. The patient is not nervous/anxious.        Objective   Physical Exam  Vitals and nursing note reviewed.   Constitutional:       General: She is not in acute distress.     Appearance: She is  well-developed. She is obese. She is not diaphoretic.   HENT:      Head: Normocephalic and atraumatic.      Right Ear: External ear normal.      Left Ear: External ear normal.      Nose: Nose normal.   Eyes:      Conjunctiva/sclera: Conjunctivae normal.      Pupils: Pupils are equal, round, and reactive to light.   Neck:      Thyroid: No thyromegaly.   Cardiovascular:      Rate and Rhythm: Normal rate and regular rhythm.      Heart sounds: Normal heart sounds. No murmur heard.  No friction rub. No gallop.    Pulmonary:      Effort: Pulmonary effort is normal.      Breath sounds: Normal breath sounds. No wheezing.   Abdominal:      General: Bowel sounds are normal.      Palpations: Abdomen is soft.      Tenderness: There is no abdominal tenderness.   Musculoskeletal:         General: Tenderness present. No deformity. Normal range of motion.      Cervical back: Normal range of motion and neck supple.      Comments: Heel tenderness giovanni  giovanni knee tenderness   Lymphadenopathy:      Cervical: No cervical adenopathy.   Skin:     General: Skin is warm and dry.      Capillary Refill: Capillary refill takes less than 2 seconds.      Findings: No rash.   Neurological:      Mental Status: She is alert and oriented to person, place, and time.      Cranial Nerves: No cranial nerve deficit.      Gait: Gait abnormal (antaglic gait).   Psychiatric:         Behavior: Behavior normal.         Thought Content: Thought content normal.         Judgment: Judgment normal.         Vitals:    11/01/21 1359   BP: 104/70   Pulse: 78   Resp: 18   Temp: 97.1 °F (36.2 °C)   SpO2: 100%     Body mass index is 31.28 kg/m².    LDL Cholesterol    Date Value Ref Range Status   11/01/2021 146 (H) 0 - 100 mg/dL Final   01/14/2019 114 (H) 0 - 100 mg/dL Final   07/24/2018 175 (H) 0 - 100 mg/dL Final     TSH   Date Value Ref Range Status   11/01/2021 0.823 0.270 - 4.200 uIU/mL Final   01/13/2021 0.553 0.270 - 4.200 uIU/mL Final   12/03/2018 1.36 0.34 - 5.60  uIU/mL Final     Comment:     (SEE BELOW)  NOTE: Results may be falsely decreased if patient taking Biotin       Results for orders placed or performed in visit on 11/01/21   TSH+Free T4    Specimen: Blood   Result Value Ref Range    TSH 0.823 0.270 - 4.200 uIU/mL    Free T4 1.05 0.93 - 1.70 ng/dL   CBC Auto Differential    Specimen: Blood   Result Value Ref Range    WBC 11.42 (H) 3.40 - 10.80 10*3/mm3    RBC 5.39 (H) 3.77 - 5.28 10*6/mm3    Hemoglobin 12.3 12.0 - 15.9 g/dL    Hematocrit 42.0 34.0 - 46.6 %    MCV 77.9 (L) 79.0 - 97.0 fL    MCH 22.8 (L) 26.6 - 33.0 pg    MCHC 29.3 (L) 31.5 - 35.7 g/dL    RDW 15.4 12.3 - 15.4 %    RDW-SD 42.7 37.0 - 54.0 fl    MPV 11.3 6.0 - 12.0 fL    Platelets 415 140 - 450 10*3/mm3    Neutrophil % 57.9 42.7 - 76.0 %    Lymphocyte % 30.3 19.6 - 45.3 %    Monocyte % 5.7 5.0 - 12.0 %    Eosinophil % 4.6 0.3 - 6.2 %    Basophil % 1.1 0.0 - 1.5 %    Immature Grans % 0.4 0.0 - 0.5 %    Neutrophils, Absolute 6.63 1.70 - 7.00 10*3/mm3    Lymphocytes, Absolute 3.46 (H) 0.70 - 3.10 10*3/mm3    Monocytes, Absolute 0.65 0.10 - 0.90 10*3/mm3    Eosinophils, Absolute 0.52 (H) 0.00 - 0.40 10*3/mm3    Basophils, Absolute 0.12 0.00 - 0.20 10*3/mm3    Immature Grans, Absolute 0.04 0.00 - 0.05 10*3/mm3    nRBC 0.0 0.0 - 0.2 /100 WBC   Hepatitis C antibody    Specimen: Blood   Result Value Ref Range    Hepatitis C Ab Non-Reactive Non-Reactive   Hepatitis A antibody, total    Specimen: Blood   Result Value Ref Range    Hep A Total Ab Negative Negative   Hepatitis B core antibody, total    Specimen: Blood   Result Value Ref Range    Hep B Core Total Ab Positive (A) Negative   Vitamin D 25 hydroxy    Specimen: Blood   Result Value Ref Range    25 Hydroxy, Vitamin D 32.1 30.0 - 100.0 ng/ml   Hepatitis B surface antibody    Specimen: Blood   Result Value Ref Range    Hep B S Ab Reactive (A) Non-Reactive   Hepatitis B surface antigen    Specimen: Blood   Result Value Ref Range    Hepatitis B Surface Ag  Non-Reactive Non-Reactive   Lipid Panel    Specimen: Blood   Result Value Ref Range    Total Cholesterol 227 (H) 0 - 200 mg/dL    Triglycerides 178 (H) 0 - 150 mg/dL    HDL Cholesterol 49 40 - 60 mg/dL    LDL Cholesterol  146 (H) 0 - 100 mg/dL    VLDL Cholesterol 32 5 - 40 mg/dL    LDL/HDL Ratio 2.91    Comprehensive metabolic panel    Specimen: Blood   Result Value Ref Range    Glucose 92 65 - 99 mg/dL    BUN 9 6 - 20 mg/dL    Creatinine 0.71 0.57 - 1.00 mg/dL    Sodium 137 136 - 145 mmol/L    Potassium 4.2 3.5 - 5.2 mmol/L    Chloride 101 98 - 107 mmol/L    CO2 28.8 22.0 - 29.0 mmol/L    Calcium 9.7 8.6 - 10.5 mg/dL    Total Protein 7.4 6.0 - 8.5 g/dL    Albumin 4.20 3.50 - 5.20 g/dL    ALT (SGPT) 15 1 - 33 U/L    AST (SGOT) 20 1 - 32 U/L    Alkaline Phosphatase 68 39 - 117 U/L    Total Bilirubin 0.3 0.0 - 1.2 mg/dL    eGFR Non African Amer 88 >60 mL/min/1.73    Globulin 3.2 gm/dL    A/G Ratio 1.3 g/dL    BUN/Creatinine Ratio 12.7 7.0 - 25.0    Anion Gap 7.2 5.0 - 15.0 mmol/L   Results for orders placed or performed in visit on 08/02/21   Trichomonas vaginalis, PCR - Urine, Urine, Clean Catch    Specimen: Urine, Clean Catch   Result Value Ref Range    Trichomonas vaginosis Negative Negative   Results for orders placed or performed in visit on 06/28/21   IGP, Aptima HPV, Rfx 16 / 18,45    Specimen: ThinPrep Vial   Result Value Ref Range    Diagnosis Comment (A)     Recommendation: Comment (A)     Specimen adequacy: Comment     Clinician Provided ICD-10: Comment     Performed by: Comment     Electronically signed by: Comment     . .     Pathologist Provided ICD-10: Comment     Note: Comment     Method: Comment     HPV Aptima Negative Negative   POCT urinalysis dipstick, automated    Specimen: Urine   Result Value Ref Range    Color Yellow Yellow, Straw, Dark Yellow, Patt    Clarity, UA Cloudy (A) Clear    Specific Gravity  1.025 1.005 - 1.030    pH, Urine 7.0 5.0 - 8.0    Leukocytes Large (3+) (A) Negative     Nitrite, UA Negative Negative    Protein, POC Negative Negative mg/dL    Glucose, UA Negative Negative, 1000 mg/dL (3+) mg/dL    Ketones, UA Negative Negative    Urobilinogen, UA Normal Normal    Bilirubin Negative Negative    Blood, UA Trace (A) Negative   Chlamydia trachomatis, Neisseria gonorrhoeae, PCR - Urine, Urine, Clean Catch    Specimen: Urine, Clean Catch   Result Value Ref Range    Chlamydia trachomatis, FLORINDA Negative Negative    Neisseria gonorrhoeae, FLORINDA Negative Negative   IGP,Aptima HPV,Age Gdln    Specimen: ThinPrep Vial   Result Value Ref Range    Age Gdln ACOG Testing 30-65    POCT trichomonas    Specimen: Swab   Result Value Ref Range    Trichomonas, UA POS (POSITIVE)    Urine Culture - Urine, Urine, Clean Catch    Specimen: Urine, Clean Catch   Result Value Ref Range    Urine Culture 25,000 CFU/mL Mixed Annalisa Isolated    Results for orders placed or performed during the hospital encounter of 01/13/21   COVID-19,[facility] IN-HOUSE,Nasal Swab No Transport Media 3-4 HR TAT - Swab, Nasal Cavity    Specimen: Nasal Cavity; Swab   Result Value Ref Range    COVID19 Not Detected Not Detected - Ref. Range   CBC Auto Differential    Specimen: Blood   Result Value Ref Range    WBC 8.20 3.40 - 10.80 10*3/mm3    RBC 5.07 3.77 - 5.28 10*6/mm3    Hemoglobin 11.3 (L) 12.0 - 15.9 g/dL    Hematocrit 36.3 34.0 - 46.6 %    MCV 71.6 (L) 79.0 - 97.0 fL    MCH 22.3 (L) 26.6 - 33.0 pg    MCHC 31.2 (L) 31.5 - 35.7 g/dL    RDW 17.8 (H) 12.3 - 15.4 %    RDW-SD 44.6 37.0 - 54.0 fl    MPV 8.5 6.0 - 12.0 fL    Platelets 295 140 - 450 10*3/mm3    Neutrophil % 65.6 42.7 - 76.0 %    Lymphocyte % 17.2 (L) 19.6 - 45.3 %    Monocyte % 7.8 5.0 - 12.0 %    Eosinophil % 7.5 (H) 0.3 - 6.2 %    Basophil % 1.9 (H) 0.0 - 1.5 %    Neutrophils, Absolute 5.40 1.70 - 7.00 10*3/mm3    Lymphocytes, Absolute 1.40 0.70 - 3.10 10*3/mm3    Monocytes, Absolute 0.60 0.10 - 0.90 10*3/mm3    Eosinophils, Absolute 0.60 (H) 0.00 - 0.40 10*3/mm3     Basophils, Absolute 0.20 0.00 - 0.20 10*3/mm3    nRBC 0.1 0.0 - 0.2 /100 WBC     *Note: Due to a large number of results and/or encounters for the requested time period, some results have not been displayed. A complete set of results can be found in Results Review.       Assessment/Plan   Diagnoses and all orders for this visit:    1. Primary osteoarthritis of both knees (Primary)  -     Ambulatory Referral to Pain Management    2. Plantar fasciitis, bilateral  -     Ambulatory Referral to Podiatry    3. Tobacco use disorder    4. Abnormal CBC  -     CBC & Differential    5. Elevated LFTs  -     Comprehensive metabolic panel  -     Cancel: Comprehensive metabolic panel  -     Hepatitis A antibody, total  -     Hepatitis B core antibody, total  -     Hepatitis B surface antibody  -     Hepatitis B surface antigen  -     Hepatitis C antibody    6. Dyslipidemia  -     Lipid Panel    7. Chronic fatigue  -     TSH+Free T4    8. Vitamin D deficiency  -     Vitamin D 25 hydroxy    9. Medication management  -     Cancel: Comprehensive metabolic panel    Other orders  -     celecoxib (CeleBREX) 100 MG capsule; Take 1 capsule by mouth 2 (Two) Times a Day As Needed for Mild Pain  or Moderate Pain .  Dispense: 60 capsule; Refill: 2  -     rOPINIRole (REQUIP) 4 MG tablet; Take 1 tablet by mouth Every Night. Take 1 hour before bedtime.  Dispense: 30 tablet; Refill: 2  -     Discontinue: DULoxetine (CYMBALTA) 30 MG capsule; Take 3 capsules by mouth Daily.  Dispense: 90 capsule; Refill: 2      Fu labs  Refill meds as needed  Weight loss recommended for good health  Refer to pain management  refef to podiatry

## 2021-12-15 ENCOUNTER — TELEPHONE (OUTPATIENT)
Dept: FAMILY MEDICINE CLINIC | Facility: CLINIC | Age: 46
End: 2021-12-15

## 2021-12-15 NOTE — TELEPHONE ENCOUNTER
Patient scheduled for 1/10/2022.  Patient was concerned that if her labs were abnormal she felt like waiting until January 10 was a little long.

## 2021-12-15 NOTE — TELEPHONE ENCOUNTER
----- Message from RT Adam sent at 12/15/2021  8:50 AM EST -----    ----- Message -----  From: Diana Knapp MD  Sent: 12/15/2021   8:40 AM EST  To: Erika Rubalcava, [facility] Rep, #    Have tried to call pt several times.  I need clrification due to an abnormal labs  Pls schedule for telehealth visit

## 2021-12-21 ENCOUNTER — TELEMEDICINE (OUTPATIENT)
Dept: FAMILY MEDICINE CLINIC | Facility: CLINIC | Age: 46
End: 2021-12-21

## 2021-12-21 DIAGNOSIS — L03.119 CELLULITIS OF LOWER EXTREMITY, UNSPECIFIED LATERALITY: Primary | ICD-10-CM

## 2021-12-21 DIAGNOSIS — R79.89 ELEVATED LFTS: ICD-10-CM

## 2021-12-21 DIAGNOSIS — E78.49 OTHER HYPERLIPIDEMIA: ICD-10-CM

## 2021-12-21 DIAGNOSIS — R76.8 HEPATITIS B ANTIBODY POSITIVE IN BLOOD: ICD-10-CM

## 2021-12-21 PROCEDURE — 99213 OFFICE O/P EST LOW 20 MIN: CPT | Performed by: FAMILY MEDICINE

## 2021-12-21 RX ORDER — ATORVASTATIN CALCIUM 20 MG/1
20 TABLET, FILM COATED ORAL
Qty: 90 TABLET | Refills: 1 | Status: SHIPPED | OUTPATIENT
Start: 2021-12-21 | End: 2021-12-21

## 2021-12-21 RX ORDER — MUPIROCIN CALCIUM 20 MG/G
1 CREAM TOPICAL 2 TIMES DAILY
Qty: 30 G | Refills: 1 | Status: SHIPPED | OUTPATIENT
Start: 2021-12-21 | End: 2022-08-16

## 2021-12-21 RX ORDER — ATORVASTATIN CALCIUM 20 MG/1
TABLET, FILM COATED ORAL
Qty: 15 TABLET | Refills: 0 | Status: SHIPPED | OUTPATIENT
Start: 2021-12-21 | End: 2022-08-16

## 2021-12-21 NOTE — PROGRESS NOTES
[place] visit. Do you consent to use a telephone visit for your medical care today? Yes  This was an audio and video enabled telemedicine encounter. Via [employer]  15min spent discussing labs, dx, plan      Subjective   Ellen Valladares is a 46 y.o. female.     No chief complaint on file.      History of Present Illness   Fu abnormal labs  Pt had ellevated LFT on an Er visit in January where she was treated for  Arthralgias, rash , allergic reaction.  She did not have jauncice at that time.  She was to get repeat labs and these were finally done in November and her LFT was normal   She was reactive to Hep BS ab, + Hepb core total Ab, Non reactive to HepB surface AG. She was negative for Hep A Ab and Hep C Ab.  She does not recall being diagnosed with hep B in the past. Labs do indicated past hx Hep B infection.will monitor  Cholesterol is elevated and continuation of statins is recommended    Pt c/o slow healing sores in Skin , mucpirocine will be ordered. Will consider lesion cultyre if lesions do not resolve.  Fu HLD, refill atorvastatin,low cholesteroldiet      GI referral will be considered    The following portions of the patient's history were reviewed and updated as appropriate: allergies, current medications, past family history, past medical history, past social history, past surgical history and problem list.    Past Medical History:   Diagnosis Date   • Ankle swelling    • Anxiety 2017    Death, losing my job and a split up enhanced these   • Arthritis    • Arthritis of knee, degenerative     gel injections L knee   • Depression    • Finger numbness    • Heart murmur At birth    since has gone away   • High cholesterol    • History of complete eye exam     2016 & 2018 at [facility] in [place]   • History of medical problems     Legs and Knees swell and hurt   • History of multiple miscarriages    • History of varicose veins    • HL (hearing loss) Jan 2021    ruptured my rt ear drum   • Menstrual pain      extreme   • Migraine headache    • Osteopenia 2018    Deteriorating joints/bones   • Tubal pregnancy        Past Surgical History:   Procedure Laterality Date   • OTHER SURGICAL HISTORY      multiple miscarriages last on in 2016   • OTHER SURGICAL HISTORY      s/p right fallopain tube surgery due to tubal pregnancy per [facility]   • TONSILLECTOMY  @1980   • TUMOR REMOVAL      scheduled to have3 tumor removed on  02/22/2019       Family History   Problem Relation Age of Onset   • Allergies Mother    • Stroke Mother    • Alcohol abuse Mother    • Anxiety disorder Mother    • Asthma Mother    • Cancer Mother    • COPD Mother    • Depression Mother    • Hyperlipidemia Mother    • Miscarriages / Stillbirths Mother    • Vision loss Mother    • Arthritis Father    • Mental illness Father    • Stroke Father         Resulted in death   • Depression Father    • Diabetes Maternal Grandfather    • Alcohol abuse Maternal Uncle    • Anxiety disorder Maternal Uncle    • COPD Maternal Uncle    • Depression Maternal Uncle    • Anxiety disorder Maternal Aunt    • COPD Maternal Aunt    • Depression Maternal Aunt    • Drug abuse Maternal Aunt    • Mental illness Maternal Aunt    • Anxiety disorder Maternal Aunt    • Anxiety disorder Brother    • Depression Brother    • Drug abuse Brother    • Early death Brother         Overdose (heroin)   • Anxiety disorder Sister    • Depression Sister    • Anxiety disorder Brother    • Depression Brother    • Drug abuse Brother    • Anxiety disorder Son    • Depression Son    • Anxiety disorder Maternal Aunt    • COPD Paternal Grandmother    • Diabetes Paternal Grandfather        Review of Systems   Constitutional: Negative for fatigue, unexpected weight gain and unexpected weight loss.   HENT: Negative for congestion, sinus pressure and sore throat.         Normal smell/taste   Eyes: Negative for blurred vision and visual disturbance.   Respiratory: Negative for cough, shortness of breath and  wheezing.    Cardiovascular: Negative for chest pain, palpitations and leg swelling.   Gastrointestinal: Negative for abdominal pain, constipation, diarrhea, nausea, vomiting, GERD and indigestion.   Musculoskeletal: Negative for arthralgias, back pain, gait problem, joint swelling and neck pain.   Skin: Negative for rash, skin lesions and bruise.        Slow healing sore sin legs   Allergic/Immunologic: Negative for environmental allergies.   Neurological: Negative for dizziness, tremors, syncope, weakness, light-headedness, headache and memory problem.   Psychiatric/Behavioral: Negative for sleep disturbance, depressed mood and stress. The patient is not nervous/anxious.        Objective   Physical Exam  Vitals and nursing note reviewed.   Constitutional:       General: She is not in acute distress.     Appearance: She is well-developed. She is not diaphoretic.   HENT:      Head: Normocephalic and atraumatic.      Right Ear: External ear normal.      Left Ear: External ear normal.      Nose: Nose normal.   Eyes:      Conjunctiva/sclera: Conjunctivae normal.      Pupils: Pupils are equal, round, and reactive to light.   Neck:      Thyroid: No thyromegaly.   Cardiovascular:      Rate and Rhythm: Normal rate and regular rhythm.      Heart sounds: Normal heart sounds. No murmur heard.  No friction rub. No gallop.    Pulmonary:      Effort: Pulmonary effort is normal.      Breath sounds: Normal breath sounds. No wheezing.   Abdominal:      General: Bowel sounds are normal.      Palpations: Abdomen is soft.      Tenderness: There is no abdominal tenderness.   Musculoskeletal:         General: No tenderness or deformity. Normal range of motion.      Cervical back: Normal range of motion and neck supple.   Lymphadenopathy:      Cervical: No cervical adenopathy.   Skin:     General: Skin is warm and dry.      Capillary Refill: Capillary refill takes less than 2 seconds.      Findings: Lesion (legs) present. No rash.    Neurological:      Mental Status: She is alert and oriented to person, place, and time.      Cranial Nerves: No cranial nerve deficit.   Psychiatric:         Behavior: Behavior normal.         Thought Content: Thought content normal.         Judgment: Judgment normal.         There were no vitals filed for this visit.  There is no height or weight on file to calculate BMI.    LDL Cholesterol    Date Value Ref Range Status   11/01/2021 146 (H) 0 - 100 mg/dL Final   01/14/2019 114 (H) 0 - 100 mg/dL Final   07/24/2018 175 (H) 0 - 100 mg/dL Final     TSH   Date Value Ref Range Status   11/01/2021 0.823 0.270 - 4.200 uIU/mL Final   01/13/2021 0.553 0.270 - 4.200 uIU/mL Final   12/03/2018 1.36 0.34 - 5.60 uIU/mL Final     Comment:     (SEE BELOW)  NOTE: Results may be falsely decreased if patient taking Biotin       Results for orders placed or performed in visit on 11/01/21   TSH+Free T4    Specimen: Blood   Result Value Ref Range    TSH 0.823 0.270 - 4.200 uIU/mL    Free T4 1.05 0.93 - 1.70 ng/dL   CBC Auto Differential    Specimen: Blood   Result Value Ref Range    WBC 11.42 (H) 3.40 - 10.80 10*3/mm3    RBC 5.39 (H) 3.77 - 5.28 10*6/mm3    Hemoglobin 12.3 12.0 - 15.9 g/dL    Hematocrit 42.0 34.0 - 46.6 %    MCV 77.9 (L) 79.0 - 97.0 fL    MCH 22.8 (L) 26.6 - 33.0 pg    MCHC 29.3 (L) 31.5 - 35.7 g/dL    RDW 15.4 12.3 - 15.4 %    RDW-SD 42.7 37.0 - 54.0 fl    MPV 11.3 6.0 - 12.0 fL    Platelets 415 140 - 450 10*3/mm3    Neutrophil % 57.9 42.7 - 76.0 %    Lymphocyte % 30.3 19.6 - 45.3 %    Monocyte % 5.7 5.0 - 12.0 %    Eosinophil % 4.6 0.3 - 6.2 %    Basophil % 1.1 0.0 - 1.5 %    Immature Grans % 0.4 0.0 - 0.5 %    Neutrophils, Absolute 6.63 1.70 - 7.00 10*3/mm3    Lymphocytes, Absolute 3.46 (H) 0.70 - 3.10 10*3/mm3    Monocytes, Absolute 0.65 0.10 - 0.90 10*3/mm3    Eosinophils, Absolute 0.52 (H) 0.00 - 0.40 10*3/mm3    Basophils, Absolute 0.12 0.00 - 0.20 10*3/mm3    Immature Grans, Absolute 0.04 0.00 - 0.05 10*3/mm3     nRBC 0.0 0.0 - 0.2 /100 WBC   Hepatitis C antibody    Specimen: Blood   Result Value Ref Range    Hepatitis C Ab Non-Reactive Non-Reactive   Hepatitis A antibody, total    Specimen: Blood   Result Value Ref Range    Hep A Total Ab Negative Negative   Hepatitis B core antibody, total    Specimen: Blood   Result Value Ref Range    Hep B Core Total Ab Positive (A) Negative   Vitamin D 25 hydroxy    Specimen: Blood   Result Value Ref Range    25 Hydroxy, Vitamin D 32.1 30.0 - 100.0 ng/ml   Hepatitis B surface antibody    Specimen: Blood   Result Value Ref Range    Hep B S Ab Reactive (A) Non-Reactive   Hepatitis B surface antigen    Specimen: Blood   Result Value Ref Range    Hepatitis B Surface Ag Non-Reactive Non-Reactive   Lipid Panel    Specimen: Blood   Result Value Ref Range    Total Cholesterol 227 (H) 0 - 200 mg/dL    Triglycerides 178 (H) 0 - 150 mg/dL    HDL Cholesterol 49 40 - 60 mg/dL    LDL Cholesterol  146 (H) 0 - 100 mg/dL    VLDL Cholesterol 32 5 - 40 mg/dL    LDL/HDL Ratio 2.91    Comprehensive metabolic panel    Specimen: Blood   Result Value Ref Range    Glucose 92 65 - 99 mg/dL    BUN 9 6 - 20 mg/dL    Creatinine 0.71 0.57 - 1.00 mg/dL    Sodium 137 136 - 145 mmol/L    Potassium 4.2 3.5 - 5.2 mmol/L    Chloride 101 98 - 107 mmol/L    CO2 28.8 22.0 - 29.0 mmol/L    Calcium 9.7 8.6 - 10.5 mg/dL    Total Protein 7.4 6.0 - 8.5 g/dL    Albumin 4.20 3.50 - 5.20 g/dL    ALT (SGPT) 15 1 - 33 U/L    AST (SGOT) 20 1 - 32 U/L    Alkaline Phosphatase 68 39 - 117 U/L    Total Bilirubin 0.3 0.0 - 1.2 mg/dL    eGFR Non African Amer 88 >60 mL/min/1.73    Globulin 3.2 gm/dL    A/G Ratio 1.3 g/dL    BUN/Creatinine Ratio 12.7 7.0 - 25.0    Anion Gap 7.2 5.0 - 15.0 mmol/L   Results for orders placed or performed in visit on 08/02/21   Trichomonas vaginalis, PCR - Urine, Urine, Clean Catch    Specimen: Urine, Clean Catch   Result Value Ref Range    Trichomonas vaginosis Negative Negative   Results for orders placed or  performed in visit on 06/28/21   IGP, Aptima HPV, Rfx 16 / 18,45    Specimen: ThinPrep Vial   Result Value Ref Range    Diagnosis Comment (A)     Recommendation: Comment (A)     Specimen adequacy: Comment     Clinician Provided ICD-10: Comment     Performed by: Comment     Electronically signed by: Comment     . .     Pathologist Provided ICD-10: Comment     Note: Comment     Method: Comment     HPV Aptima Negative Negative   POCT urinalysis dipstick, automated    Specimen: Urine   Result Value Ref Range    Color Yellow Yellow, Straw, Dark Yellow, Patt    Clarity, UA Cloudy (A) Clear    Specific Gravity  1.025 1.005 - 1.030    pH, Urine 7.0 5.0 - 8.0    Leukocytes Large (3+) (A) Negative    Nitrite, UA Negative Negative    Protein, POC Negative Negative mg/dL    Glucose, UA Negative Negative, 1000 mg/dL (3+) mg/dL    Ketones, UA Negative Negative    Urobilinogen, UA Normal Normal    Bilirubin Negative Negative    Blood, UA Trace (A) Negative   Chlamydia trachomatis, Neisseria gonorrhoeae, PCR - Urine, Urine, Clean Catch    Specimen: Urine, Clean Catch   Result Value Ref Range    Chlamydia trachomatis, FLORINDA Negative Negative    Neisseria gonorrhoeae, FLORINDA Negative Negative   IGP,Aptima HPV,Age Gdln    Specimen: ThinPrep Vial   Result Value Ref Range    Age Gdln ACOG Testing 30-65    POCT trichomonas    Specimen: Swab   Result Value Ref Range    Trichomonas, UA POS (POSITIVE)    Urine Culture - Urine, Urine, Clean Catch    Specimen: Urine, Clean Catch   Result Value Ref Range    Urine Culture 25,000 CFU/mL Mixed Annalisa Isolated    Results for orders placed or performed during the hospital encounter of 01/13/21   COVID-19,[facility] IN-HOUSE,Nasal Swab No Transport Media 3-4 HR TAT - Swab, Nasal Cavity    Specimen: Nasal Cavity; Swab   Result Value Ref Range    COVID19 Not Detected Not Detected - Ref. Range   CBC Auto Differential    Specimen: Blood   Result Value Ref Range    WBC 8.20 3.40 - 10.80 10*3/mm3    RBC 5.07  3.77 - 5.28 10*6/mm3    Hemoglobin 11.3 (L) 12.0 - 15.9 g/dL    Hematocrit 36.3 34.0 - 46.6 %    MCV 71.6 (L) 79.0 - 97.0 fL    MCH 22.3 (L) 26.6 - 33.0 pg    MCHC 31.2 (L) 31.5 - 35.7 g/dL    RDW 17.8 (H) 12.3 - 15.4 %    RDW-SD 44.6 37.0 - 54.0 fl    MPV 8.5 6.0 - 12.0 fL    Platelets 295 140 - 450 10*3/mm3    Neutrophil % 65.6 42.7 - 76.0 %    Lymphocyte % 17.2 (L) 19.6 - 45.3 %    Monocyte % 7.8 5.0 - 12.0 %    Eosinophil % 7.5 (H) 0.3 - 6.2 %    Basophil % 1.9 (H) 0.0 - 1.5 %    Neutrophils, Absolute 5.40 1.70 - 7.00 10*3/mm3    Lymphocytes, Absolute 1.40 0.70 - 3.10 10*3/mm3    Monocytes, Absolute 0.60 0.10 - 0.90 10*3/mm3    Eosinophils, Absolute 0.60 (H) 0.00 - 0.40 10*3/mm3    Basophils, Absolute 0.20 0.00 - 0.20 10*3/mm3    nRBC 0.1 0.0 - 0.2 /100 WBC     *Note: Due to a large number of results and/or encounters for the requested time period, some results have not been displayed. A complete set of results can be found in Results Review.       Assessment/Plan   Diagnoses and all orders for this visit:    1. Cellulitis of lower extremity, unspecified laterality (Primary)  -     mupirocin (Bactroban) 2 % cream; Apply 1 application topically to the appropriate area as directed 2 (Two) Times a Day.  Dispense: 30 g; Refill: 1    2. Elevated LFTs    3. Other hyperlipidemia    4. Hepatitis B antibody positive in blood    Other orders  -     atorvastatin (LIPITOR) 20 MG tablet; Take 1 tablet by mouth every night at bedtime.  Dispense: 90 tablet; Refill: 1      Monitor LFT  Consider GI refrral  Resume atorvastatin  Low cholesterol dietMupirocin ointment to skin lesions  'consider wound culture

## 2022-01-10 ENCOUNTER — TELEPHONE (OUTPATIENT)
Dept: ORTHOPEDIC SURGERY | Facility: CLINIC | Age: 47
End: 2022-01-10

## 2022-01-10 NOTE — TELEPHONE ENCOUNTER
MY CHART MESSAGE    ----- Message from Ellen Valladares sent at 1/8/2022  5:39 PM EST -----  Regarding: Question about knees  I have fallen down on my knees, and that was a hard fall.  I fear I may have damaged my knees further. They are bruised and extremely sensitive to the touch. I can walk but pain is very high. What can I do to relieve immediate discomfort. Or what would you recommend from this point. Thank you

## 2022-01-14 DIAGNOSIS — M17.12 PRIMARY OSTEOARTHRITIS OF LEFT KNEE: Primary | ICD-10-CM

## 2022-01-14 RX ORDER — TRAMADOL HYDROCHLORIDE 50 MG/1
50 TABLET ORAL EVERY 12 HOURS PRN
Qty: 20 TABLET | Refills: 0 | Status: SHIPPED | OUTPATIENT
Start: 2022-01-14 | End: 2022-08-16

## 2022-01-14 NOTE — TELEPHONE ENCOUNTER
----- Message from Ellen Valladares sent at 1/14/2022  7:39 AM EST -----  Regarding: Question about knees  Yes please if you don't mind, call it into [place], [place].  And I did not get x-rays, but if I continue to have pain, I will get them.

## 2022-01-20 RX ORDER — DULOXETIN HYDROCHLORIDE 30 MG/1
CAPSULE, DELAYED RELEASE ORAL
Qty: 60 CAPSULE | Refills: 1 | OUTPATIENT
Start: 2022-01-20

## 2022-01-24 ENCOUNTER — TELEPHONE (OUTPATIENT)
Dept: ORTHOPEDIC SURGERY | Facility: CLINIC | Age: 47
End: 2022-01-24

## 2022-01-24 DIAGNOSIS — M17.12 PRIMARY OSTEOARTHRITIS OF LEFT KNEE: Primary | ICD-10-CM

## 2022-01-24 DIAGNOSIS — M17.11 PRIMARY OSTEOARTHRITIS OF RIGHT KNEE: ICD-10-CM

## 2022-01-24 DIAGNOSIS — M25.561 ACUTE PAIN OF BOTH KNEES: ICD-10-CM

## 2022-01-24 DIAGNOSIS — M25.562 ACUTE PAIN OF BOTH KNEES: ICD-10-CM

## 2022-01-24 RX ORDER — FAMOTIDINE 40 MG/1
40 TABLET, FILM COATED ORAL DAILY
Qty: 30 TABLET | Refills: 0 | Status: SHIPPED | OUTPATIENT
Start: 2022-01-24 | End: 2022-08-16

## 2022-01-24 NOTE — TELEPHONE ENCOUNTER
----- Message from Ellen Valladares sent at 1/24/2022 11:29 AM EST -----  Regarding: Knee   Can we also go ahead and order another shit of the Gel for my left knee please.

## 2022-01-26 ENCOUNTER — TELEPHONE (OUTPATIENT)
Dept: FAMILY MEDICINE CLINIC | Facility: CLINIC | Age: 47
End: 2022-01-26

## 2022-01-26 NOTE — TELEPHONE ENCOUNTER
HUB to read    PA for Duloxetine 30mg TID was denied. Insurance will only pay for 2 capsules daily.    This request has not been approved. Based on the information submitted for review, you did not meet our guideline rules for the requested drug. In order for your request to be approved, your provider would need to show that you have met the guideline rules below. The details below are written in medical language. If you have questions, please contact your provider. In some cases, the requested medication or alternatives offered may have additional approval requirements. We were told you are using the requested medication for depression and nerve pain. Our guideline named SSRI/SNRI/NRI/BUPROPION (Selective serotonin reuptake inhibitor/Serotonin norepinephrine reuptake inhibitor/Norepinephrine reuptake inhibitor) (reviewed for DULOXETINE) does not allow the use of the requested medication at the requested dose/regimen. Your provider requested a quantity of 3 capsules per day but your plan allows for a quantity of 2 capsules per day. Please consider an alternate dose or dosing schedule. A written notification letter will follow with additional details.

## 2022-08-03 ENCOUNTER — CLINICAL SUPPORT (OUTPATIENT)
Dept: ORTHOPEDIC SURGERY | Facility: CLINIC | Age: 47
End: 2022-08-03

## 2022-08-03 DIAGNOSIS — M17.12 PRIMARY OSTEOARTHRITIS OF LEFT KNEE: Primary | ICD-10-CM

## 2022-08-03 PROCEDURE — 20610 DRAIN/INJ JOINT/BURSA W/O US: CPT | Performed by: ORTHOPAEDIC SURGERY

## 2022-08-03 RX ORDER — LIDOCAINE HYDROCHLORIDE 20 MG/ML
2 INJECTION, SOLUTION EPIDURAL; INFILTRATION; INTRACAUDAL; PERINEURAL
Status: COMPLETED | OUTPATIENT
Start: 2022-08-03 | End: 2022-08-03

## 2022-08-03 RX ORDER — IBUPROFEN 800 MG/1
800 TABLET ORAL 2 TIMES DAILY PRN
Qty: 40 TABLET | Refills: 0 | Status: SHIPPED | OUTPATIENT
Start: 2022-08-03 | End: 2022-08-16 | Stop reason: SDUPTHER

## 2022-08-03 RX ADMIN — LIDOCAINE HYDROCHLORIDE 2 ML: 20 INJECTION, SOLUTION EPIDURAL; INFILTRATION; INTRACAUDAL; PERINEURAL at 14:30

## 2022-08-03 NOTE — PROGRESS NOTES
INJECTION    Patient: Ellen Valladares    YOB: 1975    MRN: [id number]    Chief Complaint   Patient presents with   • Left Knee - Follow-up       History of Present Illness: Patient returns today for left knee pain.  Her pain is located over the medial aspect of the joint.  The pain has been progressive in nature and remains intermittent .  Her pain is worsened by going up and down stairs, kneeling, rising after sitting. There has been improvement in the past with rest.     This problem is not new to this examiner.     Allergies:   Allergies   Allergen Reactions   • Aspirin Unknown (See Comments)   • Penicillin G Unknown (See Comments)       Medications:   Home Medications:  Current Outpatient Medications on File Prior to Visit   Medication Sig   • atorvastatin (LIPITOR) 20 MG tablet TAKE ONE TABLET BY MOUTH EVERY NIGHT AT BEDTIME   • celecoxib (CeleBREX) 100 MG capsule Take 1 capsule by mouth 2 (Two) Times a Day As Needed for Mild Pain  or Moderate Pain .   • Cholecalciferol (Vitamin D3) 50 MCG (2000 UT) capsule Take 1 capsule by mouth Daily.   • famotidine (PEPCID) 40 MG tablet Take 1 tablet by mouth Daily.   • mupirocin (Bactroban) 2 % cream Apply 1 application topically to the appropriate area as directed 2 (Two) Times a Day.   • rOPINIRole (REQUIP) 4 MG tablet Take 1 tablet by mouth Every Night. Take 1 hour before bedtime.   • traMADol (ULTRAM) 50 MG tablet Take 1 tablet by mouth Every 12 (Twelve) Hours As Needed for Moderate Pain .     No current facility-administered medications on file prior to visit.     Current Medications:  Scheduled Meds:  PRN Meds:.    I have reviewed the patient's medical history in detail and updated the computerized patient record.  Review and summarization of old records include:    Past Medical History:   Diagnosis Date   • Ankle swelling    • Anxiety 2017    Death, losing my job and a split up enhanced these   • Arthritis    • Arthritis of knee, degenerative     gel  injections L knee   • Depression    • Finger numbness    • Heart murmur At birth    since has gone away   • High cholesterol    • History of complete eye exam     2016 & 2018 at [email or web address] in [place]   • History of medical problems     Legs and Knees swell and hurt   • History of multiple miscarriages    • History of varicose veins    • HL (hearing loss) Jan 2021    ruptured my rt ear drum   • Menstrual pain     extreme   • Migraine headache    • Osteopenia 2018    Deteriorating joints/bones   • Tubal pregnancy      Past Surgical History:   Procedure Laterality Date   • OTHER SURGICAL HISTORY      multiple miscarriages last on in 2016   • OTHER SURGICAL HISTORY      s/p right fallopain tube surgery due to tubal pregnancy per [facility]   • TONSILLECTOMY  @1980   • TUMOR REMOVAL      scheduled to have3 tumor removed on  02/22/2019     Social History     Occupational History   • Not on file   Tobacco Use   • Smoking status: Current Every Day Smoker     Packs/day: 0.50     Years: 35.00     Pack years: 17.50     Types: Cigarettes     Start date: 1/20/1985   • Smokeless tobacco: Never Used   Substance and Sexual Activity   • Alcohol use: Yes     Alcohol/week: 5.0 standard drinks     Types: 5 Standard drinks or equivalent per week     Comment: very little   • Drug use: No   • Sexual activity: Not Currently     Partners: Male     Birth control/protection: None      Social History     Social History Narrative   • Not on file     Family History   Problem Relation Age of Onset   • Allergies Mother    • Stroke Mother    • Alcohol abuse Mother    • Anxiety disorder Mother    • Asthma Mother    • Cancer Mother    • COPD Mother    • Depression Mother    • Hyperlipidemia Mother    • Miscarriages / Stillbirths Mother    • Vision loss Mother    • Arthritis Father    • Mental illness Father    • Stroke Father         Resulted in death   • Depression Father    • Diabetes Maternal Grandfather    • Alcohol abuse Maternal Uncle   "  • Anxiety disorder Maternal Uncle    • COPD Maternal Uncle    • Depression Maternal Uncle    • Anxiety disorder Maternal Aunt    • COPD Maternal Aunt    • Depression Maternal Aunt    • Drug abuse Maternal Aunt    • Mental illness Maternal Aunt    • Anxiety disorder Maternal Aunt    • Anxiety disorder Brother    • Depression Brother    • Drug abuse Brother    • Early death Brother         Overdose (heroin)   • Anxiety disorder Sister    • Depression Sister    • Anxiety disorder Brother    • Depression Brother    • Drug abuse Brother    • Anxiety disorder Son    • Depression Son    • Anxiety disorder Maternal Aunt    • COPD Paternal Grandmother    • Diabetes Paternal Grandfather        Review of Systems        Wt Readings from Last 3 Encounters:   11/01/21 85.3 kg (188 lb)   08/02/21 88 kg (194 lb)   06/28/21 90.7 kg (200 lb)     Ht Readings from Last 3 Encounters:   11/01/21 165.1 cm (65\")   08/02/21 165.1 cm (65\")   06/28/21 165.1 cm (65\")     There is no height or weight on file to calculate BMI.  No height and weight on file for this encounter.  There were no vitals filed for this visit.    Physical Exam    Musculoskeletal:    Left knee (varus). Patient has crepitus throughout range of motion. Positive patellar grind test. Mild effusion. Lachman is negative. Pivot shift is negative. Anterior and posterior drawer signs are negative. Significant joint line tenderness is noted on the medial aspect of the knee. Patient has a varus orientation of the knee. There is fullness and tenderness in the Popliteal fossa. Mild distention of a Popliteal cyst is noted in this location. Range of motion in flexion is from 0-110 degrees. Neurovascular status is intact.  Dorsalis pedis and posterior tibial artery pulses are palpable. Common peroneal nerve function is well preserved. Patient's gait is cautious and antalgic. Skin and soft tissues are mildly swollen, consistent with synovitis and effusion. The patient has a significant " limp with the first few steps after starting the gait cycle. Getting out of a chair takes a lot of effort due to pain on knee flexion.       Diagnostics:      Procedure:  Large Joint Arthrocentesis: L knee  Date/Time: 8/3/2022 2:30 PM  Consent given by: patient  Site marked: site marked  Timeout: Immediately prior to procedure a time out was called to verify the correct patient, procedure, equipment, support staff and site/side marked as required   Supporting Documentation  Indications: pain   Procedure Details  Location: knee - L knee  Preparation: Patient was prepped and draped in the usual sterile fashion  Needle size: 25 G  Approach: anteromedial  Medications administered: 88 mg Hyaluronan 88 MG/4ML; 2 mL lidocaine PF 2% 2 %  Patient tolerance: patient tolerated the procedure well with no immediate complications            Assessment:   Diagnoses and all orders for this visit:    1. Primary osteoarthritis of left knee (Primary)  -     Large Joint Arthrocentesis: L knee  -     Visco Treatment; Future    Other orders  -     ibuprofen (ADVIL,MOTRIN) 800 MG tablet; Take 1 tablet by mouth 2 (Two) Times a Day As Needed for Mild Pain .  Dispense: 40 tablet; Refill: 0          Plan:   · Injected patient's left knee joint(s)with Monovisc from an anteromedial approach   · Compression/brace   · Rest, ice, compression, and elevation (RICE) therapy  · OTC Ibuprofen 600mg by mouth every 6-8 hours as needed for pain and swelling  · Follow up in 6 month(s) and one day    Date of encounter: 08/03/2022   Myron Carranza MD

## 2022-08-16 ENCOUNTER — OFFICE VISIT (OUTPATIENT)
Dept: FAMILY MEDICINE CLINIC | Facility: CLINIC | Age: 47
End: 2022-08-16

## 2022-08-16 ENCOUNTER — TELEPHONE (OUTPATIENT)
Dept: FAMILY MEDICINE CLINIC | Facility: CLINIC | Age: 47
End: 2022-08-16

## 2022-08-16 VITALS
BODY MASS INDEX: 31.82 KG/M2 | SYSTOLIC BLOOD PRESSURE: 117 MMHG | HEART RATE: 83 BPM | HEIGHT: 65 IN | TEMPERATURE: 98 F | WEIGHT: 191 LBS | OXYGEN SATURATION: 100 % | DIASTOLIC BLOOD PRESSURE: 73 MMHG | RESPIRATION RATE: 16 BRPM

## 2022-08-16 DIAGNOSIS — F41.9 ANXIETY AND DEPRESSION: Primary | ICD-10-CM

## 2022-08-16 DIAGNOSIS — Z12.11 SCREENING FOR COLON CANCER: ICD-10-CM

## 2022-08-16 DIAGNOSIS — M79.2 NEURALGIA: ICD-10-CM

## 2022-08-16 DIAGNOSIS — E55.9 VITAMIN D DEFICIENCY: ICD-10-CM

## 2022-08-16 DIAGNOSIS — F32.A ANXIETY AND DEPRESSION: Primary | ICD-10-CM

## 2022-08-16 DIAGNOSIS — E78.5 DYSLIPIDEMIA: ICD-10-CM

## 2022-08-16 DIAGNOSIS — R79.89 ABNORMAL CBC: ICD-10-CM

## 2022-08-16 PROCEDURE — 99214 OFFICE O/P EST MOD 30 MIN: CPT | Performed by: FAMILY MEDICINE

## 2022-08-16 RX ORDER — PREGABALIN 25 MG/1
25 CAPSULE ORAL 3 TIMES DAILY
Qty: 90 CAPSULE | Refills: 1 | Status: SHIPPED | OUTPATIENT
Start: 2022-08-16 | End: 2022-08-17

## 2022-08-16 RX ORDER — IBUPROFEN 800 MG/1
800 TABLET ORAL 2 TIMES DAILY PRN
Qty: 60 TABLET | Refills: 0 | Status: SHIPPED | OUTPATIENT
Start: 2022-08-16 | End: 2022-09-22 | Stop reason: SDUPTHER

## 2022-08-16 RX ORDER — ESCITALOPRAM OXALATE 10 MG/1
10 TABLET ORAL EVERY EVENING
Qty: 30 TABLET | Refills: 1 | Status: SHIPPED | OUTPATIENT
Start: 2022-08-16 | End: 2022-09-22 | Stop reason: SDUPTHER

## 2022-08-16 NOTE — PROGRESS NOTES
Subjective   Ellen Valladares is a 47 y.o. female.     Chief Complaint   Patient presents with   • Establish Care   • Med Management     Patient states that she would like to try all new medication because everything she was previously on was not working.    • Anxiety   • Headache   • Depression   • Hyperlipidemia   • Osteoarthritis         Current Outpatient Medications:   •  ibuprofen (ADVIL,MOTRIN) 800 MG tablet, Take 1 tablet by mouth 2 (Two) Times a Day As Needed for Moderate Pain ., Disp: 60 tablet, Rfl: 0  •  cyclobenzaprine (FLEXERIL) 10 MG tablet, Take 1 tablet by mouth At Night As Needed for Muscle Spasms., Disp: 20 tablet, Rfl: 0  •  escitalopram (Lexapro) 10 MG tablet, Take 1 tablet by mouth Every Evening., Disp: 30 tablet, Rfl: 1    Past Medical History:   Diagnosis Date   • Anxiety 2017   • Arthritis of knee, degenerative     gel injections L knee   • CHOL    • Depression    • Heart murmur At birth   • History of multiple miscarriages    • HL (hearing loss)     ruptured my rt ear drum   • MAMMO     NEG = 2021   • Migraines    • PAP     2021= ASCUS/   • Varicose Veins        Past Surgical History:   Procedure Laterality Date   • COLONOSCOPY      COLOGUARD   • DILATION AND CURETTAGE, DIAGNOSTIC / THERAPEUTIC      due to miscarriages   • LIPOMA EXCISION Right     Shoulder   • SALPINGECTOMY Right     due to Tubal Preg   • TONSILLECTOMY  @1980       Family History   Problem Relation Age of Onset   • Allergies Mother    • Stroke Mother    • Alcohol abuse Mother    • Anxiety disorder Mother    • Asthma Mother    • Cancer Mother         Skin Cancer   • COPD Mother    • Depression Mother    • Hyperlipidemia Mother    • Miscarriages / Stillbirths Mother    • Vision loss Mother    • Stroke Father    • Arthritis Father    • Mental illness Father         PTSD? [place]   • Depression Father    • Anxiety disorder Sister    • Depression Sister    • Crohn's disease Sister    • Anxiety disorder Brother    • Depression  Brother    • Drug abuse Brother    • Early death Brother         Overdose (heroin)   • Anxiety disorder Brother    • Depression Brother    • Drug abuse Brother         recovering   • Diabetes Maternal Grandfather    • COPD Paternal Grandmother    • Diabetes Paternal Grandfather    • Anxiety disorder Son    • Depression Son    • Anxiety disorder Maternal Aunt    • COPD Maternal Aunt    • Depression Maternal Aunt    • Drug abuse Maternal Aunt    • Mental illness Maternal Aunt    • Anxiety disorder Maternal Aunt    • Anxiety disorder Maternal Aunt    • Alcohol abuse Maternal Uncle    • Anxiety disorder Maternal Uncle    • COPD Maternal Uncle    • Depression Maternal Uncle        Social History     Socioeconomic History   • Marital status: Single   Tobacco Use   • Smoking status: Current Every Day Smoker     Packs/day: 0.50     Years: 35.00     Pack years: 17.50     Types: Cigarettes     Start date: 1/20/1985   • Smokeless tobacco: Never Used   Vaping Use   • Vaping Use: Never used   Substance and Sexual Activity   • Alcohol use: Yes     Alcohol/week: 5.0 standard drinks     Types: 5 Standard drinks or equivalent per week   • Drug use: No   • Sexual activity: Not Currently     Partners: Male     Birth control/protection: None       46 y/o C female here to est care w/ new [facility] PCP w/ hx/o CHOL/ Anx/ Dep/ Migraines/ OA    Pt states she wants to start over on meds since whatever she was taking before wasn't working anyway  Pt fasting for labs today too    Pt w/ a lot of bodyaches and seeing ortho for her knees----getting shots so far w/ min success and taking IB  Pt took celebrex and tramadol in past w/o much relief either    Pt due for pap/ mammo this yr w/ hx/o abn Pap; pt not getting any vaccines for age at this time    Pt not done her colon cancer screening yet either       The following portions of the patient's history were reviewed and updated as appropriate: allergies, current medications, past family history, past  medical history, past social history, past surgical history and problem list.    Review of Systems   Constitutional: Negative for activity change, appetite change, fatigue, unexpected weight gain and unexpected weight loss.   Respiratory: Negative for cough, chest tightness and shortness of breath.    Cardiovascular: Negative for chest pain, palpitations and leg swelling.   Gastrointestinal: Negative for constipation, diarrhea and GERD.   Genitourinary: Negative for frequency, urgency and urinary incontinence.   Musculoskeletal: Positive for arthralgias and gait problem. Negative for myalgias.   Skin: Negative for rash.   Neurological: Positive for numbness. Negative for dizziness, facial asymmetry, speech difficulty, weakness, light-headedness, headache, memory problem and confusion.   Psychiatric/Behavioral: Positive for depressed mood. The patient is nervous/anxious.        Vitals:    08/16/22 1001   BP: 117/73   Pulse: 83   Resp: 16   Temp: 98 °F (36.7 °C)   SpO2: 100%       Objective   Physical Exam  Vitals and nursing note reviewed.   Constitutional:       General: She is not in acute distress.     Appearance: She is well-developed. She is not ill-appearing or toxic-appearing.   HENT:      Head: Normocephalic and atraumatic.   Cardiovascular:      Rate and Rhythm: Normal rate and regular rhythm.      Heart sounds: Normal heart sounds. No murmur heard.  Pulmonary:      Effort: Pulmonary effort is normal. No respiratory distress.      Breath sounds: Normal breath sounds. No stridor. No wheezing, rhonchi or rales.   Musculoskeletal:      Cervical back: Normal range of motion and neck supple.   Skin:     General: Skin is warm and dry.      Findings: No rash.   Neurological:      Mental Status: She is alert and oriented to person, place, and time.      Cranial Nerves: No cranial nerve deficit.   Psychiatric:         Attention and Perception: Attention and perception normal.         Mood and Affect: Mood and affect  normal.         Speech: Speech normal.         Behavior: Behavior normal. Behavior is cooperative.         Thought Content: Thought content normal.         Cognition and Memory: Cognition and memory normal.         Judgment: Judgment normal.           Assessment & Plan   Diagnoses and all orders for this visit:    1. Anxiety and depression (Primary)    2. Neuralgia  -     Discontinue: pregabalin (Lyrica) 25 MG capsule; Take 1 capsule by mouth 3 (Three) Times a Day.  Dispense: 90 capsule; Refill: 1    3. Dyslipidemia  -     Lipid Panel; Future  -     Comprehensive Metabolic Panel; Future    4. Abnormal CBC  -     CBC & Differential; Future    5. Vitamin D deficiency  -     Vitamin D 25 Hydroxy; Future    6. Screening for colon cancer  -     Cologuard - Stool, Per Rectum; Future    Other orders  -     escitalopram (Lexapro) 10 MG tablet; Take 1 tablet by mouth Every Evening.  Dispense: 30 tablet; Refill: 1  -     ibuprofen (ADVIL,MOTRIN) 800 MG tablet; Take 1 tablet by mouth 2 (Two) Times a Day As Needed for Moderate Pain .  Dispense: 60 tablet; Refill: 0    Trial of Lyrica/ Lexapro for nerve pain/ anx and depr  Rx--Cologuard  Pt to get fasting labs today  Reviewed recent labs w/ pt  Pt to schedule pap/ mammo  Spent > 1/2 time reviewing pt hx w/ pt

## 2022-08-16 NOTE — TELEPHONE ENCOUNTER
HUB to read    PA was denied for Pregabalin     Has the patient had a previous trial of TWO of the following medications within the past 365: 1) Tricyclic antidepressant (e.g., amitriptyline, desipramine, nortriptyline, doxepin, clomipramine, imipramine); 2) Gabapentin, 3) Cyclobenzaprine, 4) Selective serotonin reuptake inhibitor (SSRI) (e.g., fluoxetine, citalopram, escitalopram, sertraline, paroxetine), 5) Duloxetine HCl, or 6) Savella (milnacipran HCl)    Patient has only tried Duloxetine out of this list.

## 2022-08-16 NOTE — TELEPHONE ENCOUNTER
HUB to read     PA was sent for Pregabalin 25MG capsules    Waiting on the outcome from insurance

## 2022-08-16 NOTE — TELEPHONE ENCOUNTER
She was on cymbalta until 2021----didn't work  She has tried gabapentin but I think  a long time ago----caused dizzyness  See if she wants to try the flexeril

## 2022-08-16 NOTE — TELEPHONE ENCOUNTER
"HUB to share    Your insurance is not approving Lyj carlosa.     Dr Silva says, \"She was on cymbalta until 2021----didn't work  She has tried gabapentin, but I think  a long time ago----caused dizzyness  See if she wants to try the flexeril?\"    Please let us know    Sent gurmeet lyodg  "

## 2022-08-17 PROBLEM — Z01.419 NORMAL PELVIC EXAM: Status: RESOLVED | Noted: 2018-08-20 | Resolved: 2022-08-17

## 2022-08-17 PROBLEM — E66.9 OBESITY: Status: RESOLVED | Noted: 2018-07-23 | Resolved: 2022-08-17

## 2022-08-17 PROBLEM — R82.90 ABNORMAL URINALYSIS: Status: RESOLVED | Noted: 2018-08-20 | Resolved: 2022-08-17

## 2022-08-17 RX ORDER — CYCLOBENZAPRINE HCL 10 MG
10 TABLET ORAL NIGHTLY PRN
Qty: 20 TABLET | Refills: 0 | Status: SHIPPED | OUTPATIENT
Start: 2022-08-17 | End: 2022-09-22

## 2022-08-19 ENCOUNTER — PATIENT ROUNDING (BHMG ONLY) (OUTPATIENT)
Dept: FAMILY MEDICINE CLINIC | Facility: CLINIC | Age: 47
End: 2022-08-19

## 2022-08-19 NOTE — TELEPHONE ENCOUNTER
Hedy Silva, DO  to Ellen Valladares      9:50 AM  Will try flexeril at bed and if not working, call back    Last read by Ellen Valladares at 7:56 PM on 8/18/2022.    Flexeril was sent to the pharmacy

## 2022-09-22 ENCOUNTER — OFFICE VISIT (OUTPATIENT)
Dept: FAMILY MEDICINE CLINIC | Facility: CLINIC | Age: 47
End: 2022-09-22

## 2022-09-22 VITALS
HEIGHT: 65 IN | SYSTOLIC BLOOD PRESSURE: 150 MMHG | BODY MASS INDEX: 32.49 KG/M2 | TEMPERATURE: 98 F | RESPIRATION RATE: 14 BRPM | WEIGHT: 195 LBS | HEART RATE: 76 BPM | OXYGEN SATURATION: 100 % | DIASTOLIC BLOOD PRESSURE: 72 MMHG

## 2022-09-22 DIAGNOSIS — K14.8 TONGUE MASS: ICD-10-CM

## 2022-09-22 DIAGNOSIS — E55.9 VITAMIN D DEFICIENCY: ICD-10-CM

## 2022-09-22 DIAGNOSIS — R79.89 ABNORMAL CBC: ICD-10-CM

## 2022-09-22 DIAGNOSIS — Z12.31 ENCOUNTER FOR SCREENING MAMMOGRAM FOR MALIGNANT NEOPLASM OF BREAST: ICD-10-CM

## 2022-09-22 DIAGNOSIS — E78.5 DYSLIPIDEMIA: ICD-10-CM

## 2022-09-22 DIAGNOSIS — F32.4 MAJOR DEPRESSIVE DISORDER IN PARTIAL REMISSION, UNSPECIFIED WHETHER RECURRENT: ICD-10-CM

## 2022-09-22 DIAGNOSIS — Z00.00 ANNUAL PHYSICAL EXAM: Primary | ICD-10-CM

## 2022-09-22 LAB
BILIRUB BLD-MCNC: NEGATIVE MG/DL
CLARITY, POC: CLEAR
COLOR UR: YELLOW
EXPIRATION DATE: NORMAL
GLUCOSE UR STRIP-MCNC: NEGATIVE MG/DL
KETONES UR QL: NEGATIVE
LEUKOCYTE EST, POC: NEGATIVE
Lab: NORMAL
NITRITE UR-MCNC: NEGATIVE MG/ML
PH UR: 7 [PH] (ref 5–8)
PROT UR STRIP-MCNC: NEGATIVE MG/DL
RBC # UR STRIP: NEGATIVE /UL
SP GR UR: 1.02 (ref 1–1.03)
UROBILINOGEN UR QL: NORMAL

## 2022-09-22 PROCEDURE — 81003 URINALYSIS AUTO W/O SCOPE: CPT | Performed by: FAMILY MEDICINE

## 2022-09-22 PROCEDURE — 82306 VITAMIN D 25 HYDROXY: CPT | Performed by: FAMILY MEDICINE

## 2022-09-22 PROCEDURE — 99396 PREV VISIT EST AGE 40-64: CPT | Performed by: FAMILY MEDICINE

## 2022-09-22 PROCEDURE — 80061 LIPID PANEL: CPT | Performed by: FAMILY MEDICINE

## 2022-09-22 PROCEDURE — 85025 COMPLETE CBC W/AUTO DIFF WBC: CPT | Performed by: FAMILY MEDICINE

## 2022-09-22 PROCEDURE — 80053 COMPREHEN METABOLIC PANEL: CPT | Performed by: FAMILY MEDICINE

## 2022-09-22 RX ORDER — IBUPROFEN 800 MG/1
800 TABLET ORAL 2 TIMES DAILY PRN
Qty: 180 TABLET | Refills: 0 | Status: SHIPPED | OUTPATIENT
Start: 2022-09-22 | End: 2022-12-01

## 2022-09-22 RX ORDER — METHOCARBAMOL 500 MG/1
TABLET, FILM COATED ORAL
Qty: 20 TABLET | Refills: 0 | Status: SHIPPED | OUTPATIENT
Start: 2022-09-22 | End: 2022-10-13

## 2022-09-22 RX ORDER — ESCITALOPRAM OXALATE 10 MG/1
10 TABLET ORAL EVERY EVENING
Qty: 30 TABLET | Refills: 2 | Status: SHIPPED | OUTPATIENT
Start: 2022-09-22 | End: 2023-03-22 | Stop reason: SDUPTHER

## 2022-09-22 NOTE — PROGRESS NOTES
Subjective   Ellen Valladares is a 47 y.o. female.     Chief Complaint   Patient presents with   • Annual Exam     Physical with papsmear         Current Outpatient Medications:   •  escitalopram (Lexapro) 10 MG tablet, Take 1 tablet by mouth Every Evening., Disp: 30 tablet, Rfl: 2  •  ibuprofen (ADVIL,MOTRIN) 800 MG tablet, Take 1 tablet by mouth 2 (Two) Times a Day As Needed for Moderate Pain., Disp: 180 tablet, Rfl: 0    Past Medical History:   Diagnosis Date   • Anxiety 2017   • Arthritis of knee, degenerative     gel injections L knee   • Depression    • Heart murmur At birth   • History of multiple miscarriages    • HL (hearing loss)     ruptured my rt ear drum   • MAMMO     NEG = 2018   • Migraines    • PAP     2021= ASCUS/   • Varicose Veins        Past Surgical History:   Procedure Laterality Date   • COLONOSCOPY      COLOGUARD   • DILATION AND CURETTAGE, DIAGNOSTIC / THERAPEUTIC      due to miscarriages   • LIPOMA EXCISION Right     Shoulder   • SALPINGECTOMY Right     due to Tubal Preg   • TONSILLECTOMY  @1980       Family History   Problem Relation Age of Onset   • Allergies Mother    • Stroke Mother    • Alcohol abuse Mother    • Anxiety disorder Mother    • Asthma Mother    • Cancer Mother         Skin Cancer   • COPD Mother    • Depression Mother    • Hyperlipidemia Mother    • Miscarriages / Stillbirths Mother    • Vision loss Mother    • Stroke Father    • Arthritis Father    • Mental illness Father         PTSD? [place]   • Depression Father    • Anxiety disorder Sister    • Depression Sister    • Crohn's disease Sister    • Anxiety disorder Brother    • Depression Brother    • Drug abuse Brother    • Early death Brother         Overdose (heroin)   • Anxiety disorder Brother    • Depression Brother    • Drug abuse Brother         recovering   • Diabetes Maternal Grandfather    • COPD Paternal Grandmother    • Diabetes Paternal Grandfather    • Anxiety disorder Son    • Depression Son    • Anxiety  disorder Maternal Aunt    • COPD Maternal Aunt    • Depression Maternal Aunt    • Drug abuse Maternal Aunt    • Mental illness Maternal Aunt    • Anxiety disorder Maternal Aunt    • Anxiety disorder Maternal Aunt    • Alcohol abuse Maternal Uncle    • Anxiety disorder Maternal Uncle    • COPD Maternal Uncle    • Depression Maternal Uncle        Social History     Socioeconomic History   • Marital status: Single   Tobacco Use   • Smoking status: Current Every Day Smoker     Packs/day: 0.50     Years: 35.00     Pack years: 17.50     Types: Cigarettes     Start date: 1/20/1985   • Smokeless tobacco: Never Used   Vaping Use   • Vaping Use: Never used   Substance and Sexual Activity   • Alcohol use: Yes     Alcohol/week: 3.0 standard drinks     Types: 3 Standard drinks or equivalent per week   • Drug use: No   • Sexual activity: Not Currently     Partners: Male     Birth control/protection: None       History of Present Illness  46 y/o C female here for annual PE w/ pap w/ hx/o Depr/ Migraines/ Anx/ OA    Pt states she is taking IB 800mg prn w/ good OA pain relief usu  Pt feels the LEXAPRO is working at the current dose       The following portions of the patient's history were reviewed and updated as appropriate: allergies, current medications, past family history, past medical history, past social history, past surgical history and problem list.    Review of Systems   Constitutional: Negative for activity change, appetite change, fatigue, unexpected weight gain and unexpected weight loss.   HENT: Negative for mouth sores and trouble swallowing.    Respiratory: Negative for cough, chest tightness and shortness of breath.    Cardiovascular: Negative for chest pain, palpitations and leg swelling.   Genitourinary: Negative for frequency, urgency and urinary incontinence.   Musculoskeletal: Negative for arthralgias and myalgias.   Neurological: Positive for headache. Negative for dizziness, facial asymmetry, speech  difficulty, weakness, light-headedness, memory problem and confusion.   Psychiatric/Behavioral: Positive for dysphoric mood and depressed mood. The patient is not nervous/anxious.        Vitals:    09/22/22 1423   BP: 150/72   Pulse: 76   Resp: 14   Temp: 98 °F (36.7 °C)   SpO2: 100%       Objective   Physical Exam  Vitals and nursing note reviewed.   Constitutional:       General: She is not in acute distress.     Appearance: Normal appearance. She is well-developed. She is not ill-appearing or toxic-appearing.   HENT:      Head: Normocephalic and atraumatic.      Right Ear: Tympanic membrane, ear canal and external ear normal. There is no impacted cerumen.      Left Ear: Tympanic membrane, ear canal and external ear normal. There is no impacted cerumen.      Nose: Nose normal. No rhinorrhea.      Mouth/Throat:      Mouth: Mucous membranes are moist.      Tongue: Lesions present. Tongue does not deviate from midline.      Pharynx: Oropharynx is clear. No oropharyngeal exudate or posterior oropharyngeal erythema.     Eyes:      General: Lids are normal.      Extraocular Movements: Extraocular movements intact.      Conjunctiva/sclera: Conjunctivae normal.      Pupils: Pupils are equal, round, and reactive to light.   Neck:      Thyroid: No thyromegaly.      Trachea: Trachea normal.   Cardiovascular:      Rate and Rhythm: Normal rate and regular rhythm.      Heart sounds: Normal heart sounds. No murmur heard.  Pulmonary:      Effort: Pulmonary effort is normal. No respiratory distress.      Breath sounds: Normal breath sounds. No stridor. No wheezing, rhonchi or rales.   Chest:   Breasts: Breasts are symmetrical.      Right: No inverted nipple, mass, nipple discharge, skin change, tenderness, axillary adenopathy or supraclavicular adenopathy.      Left: No inverted nipple, mass, nipple discharge, skin change, tenderness, axillary adenopathy or supraclavicular adenopathy.       Abdominal:      General: Bowel sounds  are normal. There is no distension.      Palpations: Abdomen is soft. There is no mass.      Tenderness: There is no abdominal tenderness.      Hernia: No hernia is present. There is no hernia in the left inguinal area or right inguinal area.   Genitourinary:     General: Normal vulva.      Exam position: Supine.      Labia:         Right: No rash, tenderness, lesion or injury.         Left: No rash, tenderness, lesion or injury.       Vagina: Normal. No vaginal discharge, erythema, tenderness, bleeding or lesions.      Cervix: Normal.      Uterus: Normal.       Adnexa: Right adnexa normal and left adnexa normal.        Right: No mass or tenderness.          Left: No mass or tenderness.     Musculoskeletal:         General: No tenderness.      Cervical back: Normal range of motion and neck supple.   Lymphadenopathy:      Cervical: No cervical adenopathy.      Right cervical: No superficial or posterior cervical adenopathy.     Left cervical: No superficial or posterior cervical adenopathy.      Upper Body:      Right upper body: No supraclavicular or axillary adenopathy.      Left upper body: No supraclavicular or axillary adenopathy.      Lower Body: No right inguinal adenopathy. No left inguinal adenopathy.   Skin:     General: Skin is warm and dry.      Capillary Refill: Capillary refill takes less than 2 seconds.      Findings: No erythema or rash.   Neurological:      General: No focal deficit present.      Mental Status: She is alert and oriented to person, place, and time.      Cranial Nerves: No cranial nerve deficit.      Sensory: No sensory deficit.      Motor: No abnormal muscle tone.   Psychiatric:         Attention and Perception: Attention and perception normal.         Mood and Affect: Mood and affect normal.         Speech: Speech normal.         Behavior: Behavior normal. Behavior is cooperative.         Thought Content: Thought content normal.         Cognition and Memory: Cognition and memory  normal.         Judgment: Judgment normal.           Assessment & Plan   Diagnoses and all orders for this visit:    1. Annual physical exam (Primary)  -     POC Urinalysis Dipstick, Automated  -     IGP,Aptima HPV,Age Gdln    2. Tongue mass  -     Ambulatory Referral to ENT (Otolaryngology)    3. Major depressive disorder in partial remission, unspecified whether recurrent (HCC)    4. Mixed hyperlipidemia    5. Encounter for screening mammogram for malignant neoplasm of breast  -     Mammo Screening Digital Tomosynthesis Bilateral With CAD; Future    Other orders  -     escitalopram (Lexapro) 10 MG tablet; Take 1 tablet by mouth Every Evening.  Dispense: 30 tablet; Refill: 2  -     ibuprofen (ADVIL,MOTRIN) 800 MG tablet; Take 1 tablet by mouth 2 (Two) Times a Day As Needed for Moderate Pain.  Dispense: 180 tablet; Refill: 0    fasting labs today  ENT referral for tongue lesion eval/ tx  Rx---Mammo  Pap done

## 2022-09-23 LAB
25(OH)D3 SERPL-MCNC: 36.9 NG/ML (ref 30–100)
ALBUMIN SERPL-MCNC: 4.2 G/DL (ref 3.5–5.2)
ALBUMIN/GLOB SERPL: 1.6 G/DL
ALP SERPL-CCNC: 55 U/L (ref 39–117)
ALT SERPL W P-5'-P-CCNC: 11 U/L (ref 1–33)
ANION GAP SERPL CALCULATED.3IONS-SCNC: 14.8 MMOL/L (ref 5–15)
AST SERPL-CCNC: 17 U/L (ref 1–32)
BASOPHILS # BLD AUTO: 0.11 10*3/MM3 (ref 0–0.2)
BASOPHILS NFR BLD AUTO: 1.1 % (ref 0–1.5)
BILIRUB SERPL-MCNC: 0.3 MG/DL (ref 0–1.2)
BUN SERPL-MCNC: 8 MG/DL (ref 6–20)
BUN/CREAT SERPL: 12.9 (ref 7–25)
CALCIUM SPEC-SCNC: 9.4 MG/DL (ref 8.6–10.5)
CHLORIDE SERPL-SCNC: 99 MMOL/L (ref 98–107)
CHOLEST SERPL-MCNC: 212 MG/DL (ref 0–200)
CO2 SERPL-SCNC: 24.2 MMOL/L (ref 22–29)
CREAT SERPL-MCNC: 0.62 MG/DL (ref 0.57–1)
DEPRECATED RDW RBC AUTO: 40.2 FL (ref 37–54)
EGFRCR SERPLBLD CKD-EPI 2021: 109.3 ML/MIN/1.73
EOSINOPHIL # BLD AUTO: 0.5 10*3/MM3 (ref 0–0.4)
EOSINOPHIL NFR BLD AUTO: 4.8 % (ref 0.3–6.2)
ERYTHROCYTE [DISTWIDTH] IN BLOOD BY AUTOMATED COUNT: 15.2 % (ref 12.3–15.4)
GLOBULIN UR ELPH-MCNC: 2.6 GM/DL
GLUCOSE SERPL-MCNC: 92 MG/DL (ref 65–99)
HCT VFR BLD AUTO: 35.2 % (ref 34–46.6)
HDLC SERPL-MCNC: 51 MG/DL (ref 40–60)
HGB BLD-MCNC: 11.1 G/DL (ref 12–15.9)
LDLC SERPL CALC-MCNC: 134 MG/DL (ref 0–100)
LDLC/HDLC SERPL: 2.57 {RATIO}
LYMPHOCYTES # BLD AUTO: 3.69 10*3/MM3 (ref 0.7–3.1)
LYMPHOCYTES NFR BLD AUTO: 35.5 % (ref 19.6–45.3)
MCH RBC QN AUTO: 23.3 PG (ref 26.6–33)
MCHC RBC AUTO-ENTMCNC: 31.5 G/DL (ref 31.5–35.7)
MCV RBC AUTO: 73.8 FL (ref 79–97)
MONOCYTES # BLD AUTO: 0.62 10*3/MM3 (ref 0.1–0.9)
MONOCYTES NFR BLD AUTO: 6 % (ref 5–12)
NEUTROPHILS NFR BLD AUTO: 5.43 10*3/MM3 (ref 1.7–7)
NEUTROPHILS NFR BLD AUTO: 52.2 % (ref 42.7–76)
PLATELET # BLD AUTO: 352 10*3/MM3 (ref 140–450)
PMV BLD AUTO: 11 FL (ref 6–12)
POTASSIUM SERPL-SCNC: 4.2 MMOL/L (ref 3.5–5.2)
PROT SERPL-MCNC: 6.8 G/DL (ref 6–8.5)
RBC # BLD AUTO: 4.77 10*6/MM3 (ref 3.77–5.28)
SODIUM SERPL-SCNC: 138 MMOL/L (ref 136–145)
TRIGL SERPL-MCNC: 149 MG/DL (ref 0–150)
VLDLC SERPL-MCNC: 27 MG/DL (ref 5–40)
WBC NRBC COR # BLD: 10.39 10*3/MM3 (ref 3.4–10.8)

## 2022-09-30 LAB
AGE GDLN ACOG TESTING: NORMAL
CYTOLOGIST CVX/VAG CYTO: NORMAL
CYTOLOGY CVX/VAG DOC CYTO: NORMAL
CYTOLOGY CVX/VAG DOC THIN PREP: NORMAL
DX ICD CODE: NORMAL
HIV 1 & 2 AB SER-IMP: NORMAL
HPV I/H RISK 4 DNA CVX QL PROBE+SIG AMP: NEGATIVE
OTHER STN SPEC: NORMAL
STAT OF ADQ CVX/VAG CYTO-IMP: NORMAL

## 2022-10-13 RX ORDER — METHOCARBAMOL 500 MG/1
TABLET, FILM COATED ORAL
Qty: 20 TABLET | Refills: 0 | Status: SHIPPED | OUTPATIENT
Start: 2022-10-13 | End: 2023-01-03

## 2022-12-01 RX ORDER — CELECOXIB 200 MG/1
200 CAPSULE ORAL 2 TIMES DAILY PRN
Qty: 60 CAPSULE | Refills: 0 | Status: SHIPPED | OUTPATIENT
Start: 2022-12-01 | End: 2023-01-03

## 2023-01-03 RX ORDER — CELECOXIB 200 MG/1
CAPSULE ORAL
Qty: 60 CAPSULE | Refills: 0 | Status: SHIPPED | OUTPATIENT
Start: 2023-01-03 | End: 2023-03-22 | Stop reason: SDUPTHER

## 2023-01-03 RX ORDER — METHOCARBAMOL 500 MG/1
TABLET, FILM COATED ORAL
Qty: 20 TABLET | Refills: 0 | Status: SHIPPED | OUTPATIENT
Start: 2023-01-03 | End: 2023-01-20

## 2023-01-04 ENCOUNTER — TELEPHONE (OUTPATIENT)
Dept: ORTHOPEDIC SURGERY | Facility: CLINIC | Age: 48
End: 2023-01-04
Payer: MEDICAID

## 2023-01-04 NOTE — TELEPHONE ENCOUNTER
Patient stopped in the office wanting to make appt for knee injection.  Please advise if patient will need to be seen or if ok to send pa request for monovisc injection.    Thank you

## 2023-01-20 RX ORDER — METHOCARBAMOL 500 MG/1
TABLET, FILM COATED ORAL
Qty: 20 TABLET | Refills: 0 | Status: SHIPPED | OUTPATIENT
Start: 2023-01-20 | End: 2023-03-22 | Stop reason: SDUPTHER

## 2023-01-20 NOTE — TELEPHONE ENCOUNTER
Rx Refill Note  Requested Prescriptions     Pending Prescriptions Disp Refills   • methocarbamol (ROBAXIN) 500 MG tablet [Pharmacy Med Name: methocarbamol 500 mg tablet] 20 tablet 0     Sig: TAKE ONE TO TWO TABLETS BY MOUTH EVERY NIGHT AT BEDTIME AS NEEDED FOR MUSCLE SPASMS      Last office visit with prescribing clinician: 9/22/2022   Last telemedicine visit with prescribing clinician: Visit date not found   Next office visit with prescribing clinician: Visit date not found                         Would you like a call back once the refill request has been completed: [] Yes [] No    If the office needs to give you a call back, can they leave a voicemail: [] Yes [] No    Jagruti Cavanaugh, TAYLOR  01/20/23, 15:05 EST

## 2023-03-22 RX ORDER — CELECOXIB 200 MG/1
200 CAPSULE ORAL 2 TIMES DAILY PRN
Qty: 60 CAPSULE | Refills: 0 | Status: SHIPPED | OUTPATIENT
Start: 2023-03-22

## 2023-03-22 RX ORDER — METHOCARBAMOL 500 MG/1
TABLET, FILM COATED ORAL
Qty: 20 TABLET | Refills: 0 | Status: SHIPPED | OUTPATIENT
Start: 2023-03-22

## 2023-03-22 RX ORDER — ESCITALOPRAM OXALATE 10 MG/1
10 TABLET ORAL EVERY EVENING
Qty: 90 TABLET | Refills: 1 | Status: SHIPPED | OUTPATIENT
Start: 2023-03-22

## 2023-03-22 NOTE — TELEPHONE ENCOUNTER
Rx Refill Note  Requested Prescriptions     Pending Prescriptions Disp Refills   • escitalopram (Lexapro) 10 MG tablet 30 tablet 2     Sig: Take 1 tablet by mouth Every Evening.   • celecoxib (CeleBREX) 200 MG capsule 60 capsule 0   • methocarbamol (ROBAXIN) 500 MG tablet 20 tablet 0      Last office visit with prescribing clinician: 9/22/2022   Last telemedicine visit with prescribing clinician: Visit date not found   Next office visit with prescribing clinician: Visit date not found                       Lipid Panel (09/22/2022 15:10)    Would you like a call back once the refill request has been completed: [] Yes [] No    If the office needs to give you a call back, can they leave a voicemail: [] Yes [] No    Elvi Moreno, RT  03/22/23, 14:58 EDT

## 2023-03-29 ENCOUNTER — OFFICE VISIT (OUTPATIENT)
Dept: ORTHOPEDIC SURGERY | Facility: CLINIC | Age: 48
End: 2023-03-29
Payer: MEDICAID

## 2023-03-29 DIAGNOSIS — M17.12 PRIMARY OSTEOARTHRITIS OF LEFT KNEE: Primary | ICD-10-CM

## 2023-03-29 PROCEDURE — 1159F MED LIST DOCD IN RCRD: CPT | Performed by: ORTHOPAEDIC SURGERY

## 2023-03-29 PROCEDURE — 20610 DRAIN/INJ JOINT/BURSA W/O US: CPT | Performed by: ORTHOPAEDIC SURGERY

## 2023-03-29 PROCEDURE — 1160F RVW MEDS BY RX/DR IN RCRD: CPT | Performed by: ORTHOPAEDIC SURGERY

## 2023-03-29 RX ADMIN — LIDOCAINE HYDROCHLORIDE 2 ML: 10 INJECTION, SOLUTION EPIDURAL; INFILTRATION; INTRACAUDAL; PERINEURAL at 13:59

## 2023-03-29 NOTE — PROGRESS NOTES
INJECTION    Patient: Ellen Valladares    YOB: 1975    MRN: [id number]    Chief Complaint   Patient presents with   • Left Knee - Follow-up       History of Present Illness: Patient returns today for left knee pain.  Her pain is located over the medial and lateral aspect of the joint.  The pain has been progressive in nature and remains intermittent .  Her pain is worsened by kneeling, squatting. There has been improvement in the past with heat and NSAIDS.     This problem is not new to this examiner.     Allergies:   Allergies   Allergen Reactions   • Aspirin Unknown (See Comments)   • Penicillin G Unknown (See Comments)       Medications:   Home Medications:  Current Outpatient Medications on File Prior to Visit   Medication Sig   • celecoxib (CeleBREX) 200 MG capsule Take 1 capsule by mouth 2 (Two) Times a Day As Needed for Mild Pain.   • escitalopram (Lexapro) 10 MG tablet Take 1 tablet by mouth Every Evening.   • methocarbamol (ROBAXIN) 500 MG tablet 1-2 po qhs prn ms spasms     No current facility-administered medications on file prior to visit.     Current Medications:  Scheduled Meds:  PRN Meds:.    I have reviewed the patient's medical history in detail and updated the computerized patient record.  Review and summarization of old records include:    Past Medical History:   Diagnosis Date   • Anxiety 2017   • Arthritis of knee, degenerative     gel injections L knee   • Depression    • Heart murmur At birth   • History of multiple miscarriages    • HL (hearing loss)     ruptured my rt ear drum   • MAMMO     NEG = 2018   • Migraines    • PAP     2021= ASCUS/ 2022= NEG   • Varicose Veins      Past Surgical History:   Procedure Laterality Date   • COLONOSCOPY      COLOGUARD   • DILATION AND CURETTAGE, DIAGNOSTIC / THERAPEUTIC      due to miscarriages   • LIPOMA EXCISION Right     Shoulder   • SALPINGECTOMY Right     due to Tubal Preg   • TONSILLECTOMY  @1980     Social History     Occupational  History   • Not on file   Tobacco Use   • Smoking status: Every Day     Packs/day: 0.50     Years: 35.00     Pack years: 17.50     Types: Cigarettes     Start date: 1/20/1985   • Smokeless tobacco: Never   Vaping Use   • Vaping Use: Never used   Substance and Sexual Activity   • Alcohol use: Yes     Alcohol/week: 3.0 standard drinks     Types: 3 Standard drinks or equivalent per week   • Drug use: No   • Sexual activity: Not Currently     Partners: Male     Birth control/protection: None      Social History     Social History Narrative   • Not on file     Family History   Problem Relation Age of Onset   • Allergies Mother    • Stroke Mother    • Alcohol abuse Mother    • Anxiety disorder Mother    • Asthma Mother    • Cancer Mother         Skin Cancer   • COPD Mother    • Depression Mother    • Hyperlipidemia Mother    • Miscarriages / Stillbirths Mother    • Vision loss Mother    • Stroke Father    • Arthritis Father    • Mental illness Father         PTSD? [place]   • Depression Father    • Anxiety disorder Sister    • Depression Sister    • Crohn's disease Sister    • Anxiety disorder Brother    • Depression Brother    • Drug abuse Brother    • Early death Brother         Overdose (heroin)   • Anxiety disorder Brother    • Depression Brother    • Drug abuse Brother         recovering   • Diabetes Maternal Grandfather    • COPD Paternal Grandmother    • Diabetes Paternal Grandfather    • Anxiety disorder Son    • Depression Son    • Anxiety disorder Maternal Aunt    • COPD Maternal Aunt    • Depression Maternal Aunt    • Drug abuse Maternal Aunt    • Mental illness Maternal Aunt    • Anxiety disorder Maternal Aunt    • Anxiety disorder Maternal Aunt    • Alcohol abuse Maternal Uncle    • Anxiety disorder Maternal Uncle    • COPD Maternal Uncle    • Depression Maternal Uncle        Review of Systems        Wt Readings from Last 3 Encounters:   09/22/22 88.5 kg (195 lb)   08/16/22 86.6 kg (191 lb)   11/01/21 85.3 kg  "(188 lb)     Ht Readings from Last 3 Encounters:   09/22/22 165.1 cm (65\")   08/16/22 165.1 cm (65\")   11/01/21 165.1 cm (65\")     There is no height or weight on file to calculate BMI.  No height and weight on file for this encounter.  There were no vitals filed for this visit.    Physical Exam    Musculoskeletal:    Left knee (varus). Patient has crepitus throughout range of motion. Positive patellar grind test. Mild effusion. Lachman is negative. Pivot shift is negative. Anterior and posterior drawer signs are negative. Significant joint line tenderness is noted on the medial aspect of the knee. Patient has a varus orientation of the knee. There is fullness and tenderness in the Popliteal fossa. Mild distention of a Popliteal cyst is noted in this location. Range of motion in flexion is from 0-110 degrees. Neurovascular status is intact.  Dorsalis pedis and posterior tibial artery pulses are palpable. Common peroneal nerve function is well preserved. Patient's gait is cautious and antalgic. Skin and soft tissues are mildly swollen, consistent with synovitis and effusion. The patient has a significant limp with the first few steps after starting the gait cycle. Getting out of a chair takes a lot of effort due to pain on knee flexion.       Diagnostics:      Procedure:  Large Joint Arthrocentesis: L knee  Date/Time: 3/29/2023 1:59 PM  Consent given by: patient  Site marked: site marked  Timeout: Immediately prior to procedure a time out was called to verify the correct patient, procedure, equipment, support staff and site/side marked as required   Supporting Documentation  Indications: pain   Procedure Details  Location: knee - L knee  Preparation: Patient was prepped and draped in the usual sterile fashion  Needle size: 25 G  Approach: anteromedial  Medications administered: 88 mg Hyaluronan 88 MG/4ML; 2 mL lidocaine PF 1% 1 %  Patient tolerance: patient tolerated the procedure well with no immediate " complications            Assessment:   Diagnoses and all orders for this visit:    1. Primary osteoarthritis of left knee (Primary)  -     Large Joint Arthrocentesis: L knee  -     Visco Treatment; Future          Plan:   · Injected patient's left knee joint(s)with Monovisc from an anteromedial approach   · Compression/brace   · Rest, ice, compression, and elevation (RICE) therapy  · OTC Tylenol 500-1000mg by mouth every 6 hours as needed for pain   · Follow up in 6 month(s)    Date of encounter: 03/29/2023   Myron Carranza MD

## 2023-04-04 RX ORDER — LIDOCAINE HYDROCHLORIDE 10 MG/ML
2 INJECTION, SOLUTION EPIDURAL; INFILTRATION; INTRACAUDAL; PERINEURAL
Status: COMPLETED | OUTPATIENT
Start: 2023-03-29 | End: 2023-03-29

## 2023-07-19 ENCOUNTER — OFFICE VISIT (OUTPATIENT)
Dept: ORTHOPEDIC SURGERY | Facility: CLINIC | Age: 48
End: 2023-07-19
Payer: MEDICAID

## 2023-07-19 VITALS — HEIGHT: 65 IN | WEIGHT: 197 LBS | BODY MASS INDEX: 32.82 KG/M2

## 2023-07-19 DIAGNOSIS — M17.12 PRIMARY OSTEOARTHRITIS OF LEFT KNEE: Primary | ICD-10-CM

## 2023-07-19 DIAGNOSIS — M17.11 PRIMARY OSTEOARTHRITIS OF RIGHT KNEE: ICD-10-CM

## 2023-07-19 PROCEDURE — 1160F RVW MEDS BY RX/DR IN RCRD: CPT | Performed by: ORTHOPAEDIC SURGERY

## 2023-07-19 PROCEDURE — 1159F MED LIST DOCD IN RCRD: CPT | Performed by: ORTHOPAEDIC SURGERY

## 2023-07-19 PROCEDURE — 99214 OFFICE O/P EST MOD 30 MIN: CPT | Performed by: ORTHOPAEDIC SURGERY

## 2023-08-22 DIAGNOSIS — Z01.818 PRE-OP EVALUATION: Primary | ICD-10-CM

## 2023-08-22 DIAGNOSIS — M17.12 PRIMARY OSTEOARTHRITIS OF LEFT KNEE: ICD-10-CM

## 2023-08-22 RX ORDER — ACETAMINOPHEN 325 MG/1
1000 TABLET ORAL ONCE
OUTPATIENT
Start: 2023-08-22 | End: 2023-08-22

## 2023-08-22 RX ORDER — MELOXICAM 7.5 MG/1
15 TABLET ORAL ONCE
OUTPATIENT
Start: 2023-08-22 | End: 2023-08-22

## 2023-08-22 RX ORDER — OXYCODONE HCL 10 MG/1
10 TABLET, FILM COATED, EXTENDED RELEASE ORAL ONCE
OUTPATIENT
Start: 2023-08-22 | End: 2023-08-22

## 2023-08-23 ENCOUNTER — TELEPHONE (OUTPATIENT)
Dept: ORTHOPEDIC SURGERY | Facility: CLINIC | Age: 48
End: 2023-08-23
Payer: MEDICAID

## 2023-08-25 ENCOUNTER — TELEPHONE (OUTPATIENT)
Dept: FAMILY MEDICINE CLINIC | Facility: CLINIC | Age: 48
End: 2023-08-25
Payer: MEDICAID

## 2023-08-25 NOTE — TELEPHONE ENCOUNTER
HUB TO READ.    ATTEMPTED TO CALL PATIENT BUT NO OPTION TO LEAVE VOICEMAIL. PATIENT NEEDS SCHEDULED WITH DR COWAN FOR SURGERY CLEARANCE. PLEASE TRANSFER TO THE OFFICE SO WE CAN HELP WITH THIS.

## 2023-09-07 ENCOUNTER — LAB (OUTPATIENT)
Dept: LAB | Facility: HOSPITAL | Age: 48
End: 2023-09-07
Payer: MEDICAID

## 2023-09-07 ENCOUNTER — HOSPITAL ENCOUNTER (OUTPATIENT)
Dept: GENERAL RADIOLOGY | Facility: HOSPITAL | Age: 48
Discharge: HOME | End: 2023-09-07
Payer: MEDICAID

## 2023-09-07 ENCOUNTER — HOSPITAL ENCOUNTER (OUTPATIENT)
Dept: CARDIOLOGY | Facility: HOSPITAL | Age: 48
Discharge: HOME | End: 2023-09-07
Payer: MEDICAID

## 2023-09-07 ENCOUNTER — PRE-ADMISSION TESTING (OUTPATIENT)
Dept: PREADMISSION TESTING | Facility: HOSPITAL | Age: 48
End: 2023-09-07
Payer: MEDICAID

## 2023-09-07 VITALS
TEMPERATURE: 97.9 F | SYSTOLIC BLOOD PRESSURE: 152 MMHG | HEIGHT: 65 IN | WEIGHT: 190 LBS | RESPIRATION RATE: 18 BRPM | HEART RATE: 85 BPM | OXYGEN SATURATION: 98 % | DIASTOLIC BLOOD PRESSURE: 86 MMHG | BODY MASS INDEX: 31.65 KG/M2

## 2023-09-07 DIAGNOSIS — Z01.818 PRE-OP EVALUATION: ICD-10-CM

## 2023-09-07 LAB
ABO GROUP BLD: NORMAL
ANION GAP SERPL CALCULATED.3IONS-SCNC: 12 MMOL/L (ref 5–15)
BACTERIA UR QL AUTO: NORMAL /HPF
BASOPHILS # BLD AUTO: 0.1 10*3/MM3 (ref 0–0.2)
BASOPHILS NFR BLD AUTO: 0.9 % (ref 0–1.5)
BILIRUB UR QL STRIP: NEGATIVE
BLD GP AB SCN SERPL QL: NEGATIVE
BUN SERPL-MCNC: 9 MG/DL (ref 6–20)
BUN/CREAT SERPL: 13.6 (ref 7–25)
CALCIUM SPEC-SCNC: 9.6 MG/DL (ref 8.6–10.5)
CHLORIDE SERPL-SCNC: 104 MMOL/L (ref 98–107)
CLARITY UR: CLEAR
CO2 SERPL-SCNC: 22 MMOL/L (ref 22–29)
COLOR UR: YELLOW
CREAT SERPL-MCNC: 0.66 MG/DL (ref 0.57–1)
DEPRECATED RDW RBC AUTO: 38.3 FL (ref 37–54)
EGFRCR SERPLBLD CKD-EPI 2021: 107 ML/MIN/1.73
EOSINOPHIL # BLD AUTO: 0.37 10*3/MM3 (ref 0–0.4)
EOSINOPHIL NFR BLD AUTO: 3.4 % (ref 0.3–6.2)
ERYTHROCYTE [DISTWIDTH] IN BLOOD BY AUTOMATED COUNT: 14.3 % (ref 12.3–15.4)
GLUCOSE SERPL-MCNC: 95 MG/DL (ref 65–99)
GLUCOSE UR STRIP-MCNC: NEGATIVE MG/DL
HBA1C MFR BLD: 5.7 % (ref 4.8–5.6)
HCT VFR BLD AUTO: 40.7 % (ref 34–46.6)
HGB BLD-MCNC: 12.5 G/DL (ref 12–15.9)
HGB UR QL STRIP.AUTO: ABNORMAL
HYALINE CASTS UR QL AUTO: NORMAL /LPF
IMM GRANULOCYTES # BLD AUTO: 0.03 10*3/MM3 (ref 0–0.05)
IMM GRANULOCYTES NFR BLD AUTO: 0.3 % (ref 0–0.5)
KETONES UR QL STRIP: NEGATIVE
LEUKOCYTE ESTERASE UR QL STRIP.AUTO: NEGATIVE
LYMPHOCYTES # BLD AUTO: 3.1 10*3/MM3 (ref 0.7–3.1)
LYMPHOCYTES NFR BLD AUTO: 28.8 % (ref 19.6–45.3)
MCH RBC QN AUTO: 23.1 PG (ref 26.6–33)
MCHC RBC AUTO-ENTMCNC: 30.7 G/DL (ref 31.5–35.7)
MCV RBC AUTO: 75.2 FL (ref 79–97)
MONOCYTES # BLD AUTO: 0.55 10*3/MM3 (ref 0.1–0.9)
MONOCYTES NFR BLD AUTO: 5.1 % (ref 5–12)
MRSA DNA SPEC QL NAA+PROBE: NORMAL
NEUTROPHILS NFR BLD AUTO: 6.61 10*3/MM3 (ref 1.7–7)
NEUTROPHILS NFR BLD AUTO: 61.5 % (ref 42.7–76)
NITRITE UR QL STRIP: NEGATIVE
NRBC BLD AUTO-RTO: 0.1 /100 WBC (ref 0–0.2)
PH UR STRIP.AUTO: 6.5 [PH] (ref 5–8)
PLATELET # BLD AUTO: 442 10*3/MM3 (ref 140–450)
PMV BLD AUTO: 10.7 FL (ref 6–12)
POTASSIUM SERPL-SCNC: 4.1 MMOL/L (ref 3.5–5.2)
PROT UR QL STRIP: NEGATIVE
QT INTERVAL: 390 MS
QTC INTERVAL: 470 MS
RBC # BLD AUTO: 5.41 10*6/MM3 (ref 3.77–5.28)
RBC # UR STRIP: NORMAL /HPF
REF LAB TEST METHOD: NORMAL
RH BLD: POSITIVE
SODIUM SERPL-SCNC: 138 MMOL/L (ref 136–145)
SP GR UR STRIP: 1.02 (ref 1–1.03)
SQUAMOUS #/AREA URNS HPF: NORMAL /HPF
T&S EXPIRATION DATE: NORMAL
UROBILINOGEN UR QL STRIP: ABNORMAL
WBC # UR STRIP: NORMAL /HPF
WBC NRBC COR # BLD: 10.76 10*3/MM3 (ref 3.4–10.8)

## 2023-09-07 PROCEDURE — 87641 MR-STAPH DNA AMP PROBE: CPT

## 2023-09-07 PROCEDURE — 93005 ELECTROCARDIOGRAM TRACING: CPT | Performed by: NURSE PRACTITIONER

## 2023-09-07 PROCEDURE — 83036 HEMOGLOBIN GLYCOSYLATED A1C: CPT

## 2023-09-07 PROCEDURE — 86901 BLOOD TYPING SEROLOGIC RH(D): CPT

## 2023-09-07 PROCEDURE — 86900 BLOOD TYPING SEROLOGIC ABO: CPT

## 2023-09-07 PROCEDURE — 36415 COLL VENOUS BLD VENIPUNCTURE: CPT

## 2023-09-07 PROCEDURE — 80048 BASIC METABOLIC PNL TOTAL CA: CPT

## 2023-09-07 PROCEDURE — 71046 X-RAY EXAM CHEST 2 VIEWS: CPT

## 2023-09-07 PROCEDURE — 97161 PT EVAL LOW COMPLEX 20 MIN: CPT

## 2023-09-07 PROCEDURE — 86850 RBC ANTIBODY SCREEN: CPT

## 2023-09-07 PROCEDURE — 85025 COMPLETE CBC W/AUTO DIFF WBC: CPT

## 2023-09-07 PROCEDURE — 81001 URINALYSIS AUTO W/SCOPE: CPT

## 2023-09-07 NOTE — PLAN OF CARE
Goal Outcome Evaluation:  Plan of Care Reviewed With: patient, significant other   Pt presents to [facility] as a 49 y/o F for planned Left TKA per Dr. Carranza on 9/19/23 at 15:00. PMHx: anxiety, arthritis, depression, RLS, PVD. Pre-op AROM left knee -5 to 110 degrees. At baseline, pt lives with s.o. in [facility] with 4 step entry with rail and uses no AD for community mobility. PT reviewed TKA protocol including exercises, use of AD, cryotherapy, schedule and answered all pt questions. Pt is expected to progress well with TKA recovery. Pt will re-evaluate pt post-operatively. PT will re-evaluate pt post-operatively. PT recommendation is Home with Assist and either HH or OP PT. Pt will need a rolling walker for home.                Anticipated Discharge Disposition (PT): home with assist, home with home health, home with outpatient therapy services
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
## 2023-09-18 ENCOUNTER — ANESTHESIA EVENT (OUTPATIENT)
Dept: PERIOP | Facility: HOSPITAL | Age: 48
End: 2023-09-18
Payer: MEDICAID

## 2023-09-19 ENCOUNTER — APPOINTMENT (OUTPATIENT)
Dept: GENERAL RADIOLOGY | Facility: HOSPITAL | Age: 48
End: 2023-09-19
Payer: MEDICAID

## 2023-09-19 ENCOUNTER — HOSPITAL ENCOUNTER (OUTPATIENT)
Facility: HOSPITAL | Age: 48
Discharge: HOME | End: 2023-09-19
Attending: ORTHOPAEDIC SURGERY | Admitting: ORTHOPAEDIC SURGERY
Payer: MEDICAID

## 2023-09-19 ENCOUNTER — ANESTHESIA (OUTPATIENT)
Dept: PERIOP | Facility: HOSPITAL | Age: 48
End: 2023-09-19
Payer: MEDICAID

## 2023-09-19 VITALS
RESPIRATION RATE: 16 BRPM | WEIGHT: 194.8 LBS | HEART RATE: 64 BPM | OXYGEN SATURATION: 93 % | DIASTOLIC BLOOD PRESSURE: 68 MMHG | SYSTOLIC BLOOD PRESSURE: 110 MMHG | TEMPERATURE: 97.9 F | BODY MASS INDEX: 32.46 KG/M2 | HEIGHT: 65 IN

## 2023-09-19 DIAGNOSIS — M17.11 PRIMARY OSTEOARTHRITIS OF RIGHT KNEE: Primary | ICD-10-CM

## 2023-09-19 DIAGNOSIS — Z96.652 STATUS POST LEFT PARTIAL KNEE REPLACEMENT: ICD-10-CM

## 2023-09-19 DIAGNOSIS — M17.12 PRIMARY OSTEOARTHRITIS OF LEFT KNEE: ICD-10-CM

## 2023-09-19 LAB — B-HCG UR QL: NEGATIVE

## 2023-09-19 PROCEDURE — 25010000002 CLONIDINE PER 1 MG: Performed by: ORTHOPAEDIC SURGERY

## 2023-09-19 PROCEDURE — 97164 PT RE-EVAL EST PLAN CARE: CPT

## 2023-09-19 PROCEDURE — 25010000002 MIDAZOLAM PER 1 MG: Performed by: ANESTHESIOLOGY

## 2023-09-19 PROCEDURE — 25010000002 ROPIVACAINE PER 1 MG: Performed by: ANESTHESIOLOGY

## 2023-09-19 PROCEDURE — G0378 HOSPITAL OBSERVATION PER HR: HCPCS

## 2023-09-19 PROCEDURE — C1776 JOINT DEVICE (IMPLANTABLE): HCPCS | Performed by: ORTHOPAEDIC SURGERY

## 2023-09-19 PROCEDURE — 25010000002 CEFAZOLIN PER 500 MG: Performed by: ORTHOPAEDIC SURGERY

## 2023-09-19 PROCEDURE — 25010000002 CEFAZOLIN PER 500 MG: Performed by: NURSE PRACTITIONER

## 2023-09-19 PROCEDURE — S0260 H&P FOR SURGERY: HCPCS | Performed by: ORTHOPAEDIC SURGERY

## 2023-09-19 PROCEDURE — 25010000002 HYDROMORPHONE 1 MG/ML SOLUTION: Performed by: NURSE ANESTHETIST, CERTIFIED REGISTERED

## 2023-09-19 PROCEDURE — 25010000002 EPINEPHRINE 1 MG/ML SOLUTION: Performed by: ORTHOPAEDIC SURGERY

## 2023-09-19 PROCEDURE — 81025 URINE PREGNANCY TEST: CPT | Performed by: ORTHOPAEDIC SURGERY

## 2023-09-19 PROCEDURE — 25010000002 MAGNESIUM SULFATE PER 500 MG OF MAGNESIUM: Performed by: NURSE ANESTHETIST, CERTIFIED REGISTERED

## 2023-09-19 PROCEDURE — 25010000002 FENTANYL CITRATE (PF) 100 MCG/2ML SOLUTION: Performed by: NURSE ANESTHETIST, CERTIFIED REGISTERED

## 2023-09-19 PROCEDURE — 25010000002 SUGAMMADEX 200 MG/2ML SOLUTION: Performed by: NURSE ANESTHETIST, CERTIFIED REGISTERED

## 2023-09-19 PROCEDURE — 25010000002 KETOROLAC TROMETHAMINE PER 15 MG: Performed by: ORTHOPAEDIC SURGERY

## 2023-09-19 PROCEDURE — C1713 ANCHOR/SCREW BN/BN,TIS/BN: HCPCS | Performed by: ORTHOPAEDIC SURGERY

## 2023-09-19 PROCEDURE — 25010000002 KETOROLAC TROMETHAMINE PER 15 MG: Performed by: NURSE ANESTHETIST, CERTIFIED REGISTERED

## 2023-09-19 PROCEDURE — 25010000002 ONDANSETRON PER 1 MG: Performed by: NURSE ANESTHETIST, CERTIFIED REGISTERED

## 2023-09-19 PROCEDURE — 20985 CPTR-ASST DIR MS PX: CPT | Performed by: ORTHOPAEDIC SURGERY

## 2023-09-19 PROCEDURE — 73560 X-RAY EXAM OF KNEE 1 OR 2: CPT

## 2023-09-19 PROCEDURE — 25010000002 PROPOFOL 200 MG/20ML EMULSION: Performed by: NURSE ANESTHETIST, CERTIFIED REGISTERED

## 2023-09-19 PROCEDURE — 27447 TOTAL KNEE ARTHROPLASTY: CPT | Performed by: ORTHOPAEDIC SURGERY

## 2023-09-19 PROCEDURE — 25010000002 ROPIVACAINE PER 1 MG: Performed by: ORTHOPAEDIC SURGERY

## 2023-09-19 PROCEDURE — 25010000002 DEXAMETHASONE PER 1 MG: Performed by: ANESTHESIOLOGY

## 2023-09-19 RX ORDER — DEXAMETHASONE SODIUM PHOSPHATE 4 MG/ML
INJECTION, SOLUTION INTRA-ARTICULAR; INTRALESIONAL; INTRAMUSCULAR; INTRAVENOUS; SOFT TISSUE
Status: COMPLETED | OUTPATIENT
Start: 2023-09-19 | End: 2023-09-19

## 2023-09-19 RX ORDER — DOCUSATE SODIUM 100 MG/1
100 CAPSULE, LIQUID FILLED ORAL ONCE
Status: DISCONTINUED | OUTPATIENT
Start: 2023-09-19 | End: 2023-09-19 | Stop reason: HOSPADM

## 2023-09-19 RX ORDER — OXYCODONE HCL 10 MG/1
10 TABLET, FILM COATED, EXTENDED RELEASE ORAL ONCE
Status: COMPLETED | OUTPATIENT
Start: 2023-09-19 | End: 2023-09-19

## 2023-09-19 RX ORDER — KETOROLAC TROMETHAMINE 30 MG/ML
INJECTION, SOLUTION INTRAMUSCULAR; INTRAVENOUS AS NEEDED
Status: DISCONTINUED | OUTPATIENT
Start: 2023-09-19 | End: 2023-09-19 | Stop reason: SURG

## 2023-09-19 RX ORDER — DEXAMETHASONE SODIUM PHOSPHATE 4 MG/ML
INJECTION, SOLUTION INTRA-ARTICULAR; INTRALESIONAL; INTRAMUSCULAR; INTRAVENOUS; SOFT TISSUE AS NEEDED
Status: DISCONTINUED | OUTPATIENT
Start: 2023-09-19 | End: 2023-09-19 | Stop reason: SURG

## 2023-09-19 RX ORDER — IBUPROFEN 200 MG
400 TABLET ORAL EVERY 6 HOURS PRN
COMMUNITY
End: 2023-09-19 | Stop reason: HOSPADM

## 2023-09-19 RX ORDER — MIDAZOLAM HYDROCHLORIDE 1 MG/ML
INJECTION INTRAMUSCULAR; INTRAVENOUS
Status: COMPLETED | OUTPATIENT
Start: 2023-09-19 | End: 2023-09-19

## 2023-09-19 RX ORDER — ONDANSETRON 4 MG/1
4 TABLET, FILM COATED ORAL ONCE AS NEEDED
Status: DISCONTINUED | OUTPATIENT
Start: 2023-09-19 | End: 2023-09-19 | Stop reason: HOSPADM

## 2023-09-19 RX ORDER — PROMETHAZINE HYDROCHLORIDE 25 MG/1
25 SUPPOSITORY RECTAL ONCE AS NEEDED
Status: DISCONTINUED | OUTPATIENT
Start: 2023-09-19 | End: 2023-09-19 | Stop reason: HOSPADM

## 2023-09-19 RX ORDER — GLYCOPYRROLATE 0.2 MG/ML
INJECTION INTRAMUSCULAR; INTRAVENOUS AS NEEDED
Status: DISCONTINUED | OUTPATIENT
Start: 2023-09-19 | End: 2023-09-19 | Stop reason: SURG

## 2023-09-19 RX ORDER — FENTANYL CITRATE 50 UG/ML
50 INJECTION, SOLUTION INTRAMUSCULAR; INTRAVENOUS
Status: DISCONTINUED | OUTPATIENT
Start: 2023-09-19 | End: 2023-09-19 | Stop reason: HOSPADM

## 2023-09-19 RX ORDER — ONDANSETRON 2 MG/ML
4 INJECTION INTRAMUSCULAR; INTRAVENOUS ONCE AS NEEDED
Status: DISCONTINUED | OUTPATIENT
Start: 2023-09-19 | End: 2023-09-19 | Stop reason: HOSPADM

## 2023-09-19 RX ORDER — SENNOSIDES 8.6 MG
650 CAPSULE ORAL EVERY 8 HOURS PRN
COMMUNITY
End: 2023-09-19 | Stop reason: HOSPADM

## 2023-09-19 RX ORDER — AMOXICILLIN 250 MG
2 CAPSULE ORAL 2 TIMES DAILY
Qty: 120 TABLET | Refills: 0 | Status: SHIPPED | OUTPATIENT
Start: 2023-09-19 | End: 2023-10-19

## 2023-09-19 RX ORDER — MELOXICAM 15 MG/1
15 TABLET ORAL ONCE
Status: COMPLETED | OUTPATIENT
Start: 2023-09-19 | End: 2023-09-19

## 2023-09-19 RX ORDER — PROCHLORPERAZINE EDISYLATE 5 MG/ML
10 INJECTION INTRAMUSCULAR; INTRAVENOUS ONCE AS NEEDED
Status: DISCONTINUED | OUTPATIENT
Start: 2023-09-19 | End: 2023-09-19 | Stop reason: HOSPADM

## 2023-09-19 RX ORDER — DEXMEDETOMIDINE HYDROCHLORIDE 100 UG/ML
INJECTION, SOLUTION INTRAVENOUS AS NEEDED
Status: DISCONTINUED | OUTPATIENT
Start: 2023-09-19 | End: 2023-09-19 | Stop reason: SURG

## 2023-09-19 RX ORDER — ONDANSETRON 2 MG/ML
INJECTION INTRAMUSCULAR; INTRAVENOUS AS NEEDED
Status: DISCONTINUED | OUTPATIENT
Start: 2023-09-19 | End: 2023-09-19 | Stop reason: SURG

## 2023-09-19 RX ORDER — DIPHENHYDRAMINE HYDROCHLORIDE 50 MG/ML
12.5 INJECTION INTRAMUSCULAR; INTRAVENOUS
Status: DISCONTINUED | OUTPATIENT
Start: 2023-09-19 | End: 2023-09-19 | Stop reason: HOSPADM

## 2023-09-19 RX ORDER — PROPOFOL 10 MG/ML
INJECTION, EMULSION INTRAVENOUS AS NEEDED
Status: DISCONTINUED | OUTPATIENT
Start: 2023-09-19 | End: 2023-09-19 | Stop reason: SURG

## 2023-09-19 RX ORDER — EPHEDRINE SULFATE 5 MG/ML
5 INJECTION INTRAVENOUS ONCE AS NEEDED
Status: DISCONTINUED | OUTPATIENT
Start: 2023-09-19 | End: 2023-09-19 | Stop reason: HOSPADM

## 2023-09-19 RX ORDER — PROMETHAZINE HYDROCHLORIDE 25 MG/1
25 TABLET ORAL ONCE AS NEEDED
Status: DISCONTINUED | OUTPATIENT
Start: 2023-09-19 | End: 2023-09-19 | Stop reason: HOSPADM

## 2023-09-19 RX ORDER — NALOXONE HCL 0.4 MG/ML
0.4 VIAL (ML) INJECTION AS NEEDED
Status: DISCONTINUED | OUTPATIENT
Start: 2023-09-19 | End: 2023-09-19 | Stop reason: HOSPADM

## 2023-09-19 RX ORDER — FENTANYL CITRATE 50 UG/ML
INJECTION, SOLUTION INTRAMUSCULAR; INTRAVENOUS AS NEEDED
Status: DISCONTINUED | OUTPATIENT
Start: 2023-09-19 | End: 2023-09-19 | Stop reason: SURG

## 2023-09-19 RX ORDER — HYDRALAZINE HYDROCHLORIDE 20 MG/ML
5 INJECTION INTRAMUSCULAR; INTRAVENOUS
Status: DISCONTINUED | OUTPATIENT
Start: 2023-09-19 | End: 2023-09-19 | Stop reason: HOSPADM

## 2023-09-19 RX ORDER — TRANEXAMIC ACID 10 MG/ML
1000 INJECTION, SOLUTION INTRAVENOUS ONCE
Status: COMPLETED | OUTPATIENT
Start: 2023-09-19 | End: 2023-09-19

## 2023-09-19 RX ORDER — OXYCODONE HYDROCHLORIDE 5 MG/1
5 TABLET ORAL EVERY 4 HOURS PRN
Qty: 40 TABLET | Refills: 0 | Status: SHIPPED | OUTPATIENT
Start: 2023-09-19

## 2023-09-19 RX ORDER — ONDANSETRON 4 MG/1
4 TABLET, FILM COATED ORAL EVERY 8 HOURS PRN
Qty: 30 TABLET | Refills: 0 | Status: SHIPPED | OUTPATIENT
Start: 2023-09-19 | End: 2023-09-19 | Stop reason: HOSPADM

## 2023-09-19 RX ORDER — ONDANSETRON 4 MG/1
4 TABLET, FILM COATED ORAL EVERY 8 HOURS PRN
Qty: 30 TABLET | Refills: 0 | Status: SHIPPED | OUTPATIENT
Start: 2023-09-19 | End: 2023-09-19 | Stop reason: SDUPTHER

## 2023-09-19 RX ORDER — IPRATROPIUM BROMIDE AND ALBUTEROL SULFATE 2.5; .5 MG/3ML; MG/3ML
3 SOLUTION RESPIRATORY (INHALATION) ONCE AS NEEDED
Status: DISCONTINUED | OUTPATIENT
Start: 2023-09-19 | End: 2023-09-19 | Stop reason: HOSPADM

## 2023-09-19 RX ORDER — MAGNESIUM SULFATE HEPTAHYDRATE 500 MG/ML
INJECTION, SOLUTION INTRAMUSCULAR; INTRAVENOUS AS NEEDED
Status: DISCONTINUED | OUTPATIENT
Start: 2023-09-19 | End: 2023-09-19 | Stop reason: SURG

## 2023-09-19 RX ORDER — SODIUM CHLORIDE, SODIUM LACTATE, POTASSIUM CHLORIDE, CALCIUM CHLORIDE 600; 310; 30; 20 MG/100ML; MG/100ML; MG/100ML; MG/100ML
1000 INJECTION, SOLUTION INTRAVENOUS CONTINUOUS
Status: DISCONTINUED | OUTPATIENT
Start: 2023-09-19 | End: 2023-09-19 | Stop reason: HOSPADM

## 2023-09-19 RX ORDER — MIDAZOLAM HYDROCHLORIDE 1 MG/ML
INJECTION INTRAMUSCULAR; INTRAVENOUS AS NEEDED
Status: DISCONTINUED | OUTPATIENT
Start: 2023-09-19 | End: 2023-09-19 | Stop reason: SURG

## 2023-09-19 RX ORDER — SODIUM CHLORIDE 0.9 % (FLUSH) 0.9 %
10 SYRINGE (ML) INJECTION AS NEEDED
Status: DISCONTINUED | OUTPATIENT
Start: 2023-09-19 | End: 2023-09-19 | Stop reason: HOSPADM

## 2023-09-19 RX ORDER — MEPERIDINE HYDROCHLORIDE 25 MG/ML
12.5 INJECTION INTRAMUSCULAR; INTRAVENOUS; SUBCUTANEOUS
Status: DISCONTINUED | OUTPATIENT
Start: 2023-09-19 | End: 2023-09-19 | Stop reason: HOSPADM

## 2023-09-19 RX ORDER — ROCURONIUM BROMIDE 10 MG/ML
INJECTION, SOLUTION INTRAVENOUS AS NEEDED
Status: DISCONTINUED | OUTPATIENT
Start: 2023-09-19 | End: 2023-09-19 | Stop reason: SURG

## 2023-09-19 RX ORDER — ACETAMINOPHEN 500 MG
1000 TABLET ORAL ONCE
Status: COMPLETED | OUTPATIENT
Start: 2023-09-19 | End: 2023-09-19

## 2023-09-19 RX ORDER — LABETALOL HYDROCHLORIDE 5 MG/ML
5 INJECTION, SOLUTION INTRAVENOUS
Status: DISCONTINUED | OUTPATIENT
Start: 2023-09-19 | End: 2023-09-19 | Stop reason: HOSPADM

## 2023-09-19 RX ORDER — ONDANSETRON 4 MG/1
4 TABLET, FILM COATED ORAL EVERY 8 HOURS PRN
Qty: 12 TABLET | Refills: 0 | Status: SHIPPED | OUTPATIENT
Start: 2023-09-19

## 2023-09-19 RX ORDER — FLUMAZENIL 0.1 MG/ML
0.2 INJECTION INTRAVENOUS AS NEEDED
Status: DISCONTINUED | OUTPATIENT
Start: 2023-09-19 | End: 2023-09-19 | Stop reason: HOSPADM

## 2023-09-19 RX ORDER — ROPIVACAINE HYDROCHLORIDE 5 MG/ML
INJECTION, SOLUTION EPIDURAL; INFILTRATION; PERINEURAL
Status: COMPLETED | OUTPATIENT
Start: 2023-09-19 | End: 2023-09-19

## 2023-09-19 RX ORDER — ACETAMINOPHEN,DIPHENHYDRAMINE HCL 500; 25 MG/1; MG/1
2 TABLET, FILM COATED ORAL NIGHTLY PRN
COMMUNITY
End: 2023-09-19 | Stop reason: HOSPADM

## 2023-09-19 RX ADMIN — HYDROMORPHONE HYDROCHLORIDE 1 MG: 1 INJECTION, SOLUTION INTRAMUSCULAR; INTRAVENOUS; SUBCUTANEOUS at 09:11

## 2023-09-19 RX ADMIN — ONDANSETRON 4 MG: 2 INJECTION INTRAMUSCULAR; INTRAVENOUS at 09:11

## 2023-09-19 RX ADMIN — DEXMEDETOMIDINE HYDROCHLORIDE 20 MCG: 100 INJECTION, SOLUTION INTRAVENOUS at 07:35

## 2023-09-19 RX ADMIN — SUGAMMADEX 200 MG: 100 INJECTION, SOLUTION INTRAVENOUS at 09:18

## 2023-09-19 RX ADMIN — MAGNESIUM SULFATE HEPTAHYDRATE 2 G: 500 INJECTION, SOLUTION INTRAMUSCULAR; INTRAVENOUS at 08:05

## 2023-09-19 RX ADMIN — KETOROLAC TROMETHAMINE 30 MG: 30 INJECTION, SOLUTION INTRAMUSCULAR; INTRAVENOUS at 09:13

## 2023-09-19 RX ADMIN — MIDAZOLAM HYDROCHLORIDE 2 MG: 1 INJECTION INTRAMUSCULAR; INTRAVENOUS at 07:41

## 2023-09-19 RX ADMIN — TRANEXAMIC ACID 1000 MG: 10 INJECTION, SOLUTION INTRAVENOUS at 09:03

## 2023-09-19 RX ADMIN — ACETAMINOPHEN 1000 MG: 500 TABLET, FILM COATED ORAL at 06:37

## 2023-09-19 RX ADMIN — HYDROMORPHONE HYDROCHLORIDE 0.5 MG: 1 INJECTION, SOLUTION INTRAMUSCULAR; INTRAVENOUS; SUBCUTANEOUS at 09:57

## 2023-09-19 RX ADMIN — DEXMEDETOMIDINE HYDROCHLORIDE 40 MCG: 100 INJECTION, SOLUTION INTRAVENOUS at 08:15

## 2023-09-19 RX ADMIN — PROPOFOL 200 MCG/KG/MIN: 10 INJECTION, EMULSION INTRAVENOUS at 07:41

## 2023-09-19 RX ADMIN — DEXAMETHASONE SODIUM PHOSPHATE 4 MG: 4 INJECTION, SOLUTION INTRAMUSCULAR; INTRAVENOUS at 07:20

## 2023-09-19 RX ADMIN — SODIUM CHLORIDE, POTASSIUM CHLORIDE, SODIUM LACTATE AND CALCIUM CHLORIDE 1000 ML: 600; 310; 30; 20 INJECTION, SOLUTION INTRAVENOUS at 06:25

## 2023-09-19 RX ADMIN — ROPIVACAINE HYDROCHLORIDE 30 ML: 5 INJECTION EPIDURAL; INFILTRATION; PERINEURAL at 07:20

## 2023-09-19 RX ADMIN — GLYCOPYRROLATE 0.4 MCG: 0.2 INJECTION INTRAMUSCULAR; INTRAVENOUS at 07:35

## 2023-09-19 RX ADMIN — ROCURONIUM BROMIDE 30 MG: 10 INJECTION, SOLUTION INTRAVENOUS at 07:41

## 2023-09-19 RX ADMIN — DEXAMETHASONE SODIUM PHOSPHATE 8 MG: 4 INJECTION, SOLUTION INTRA-ARTICULAR; INTRALESIONAL; INTRAMUSCULAR; INTRAVENOUS; SOFT TISSUE at 07:55

## 2023-09-19 RX ADMIN — TRANEXAMIC ACID 1000 MG: 10 INJECTION, SOLUTION INTRAVENOUS at 07:54

## 2023-09-19 RX ADMIN — PROPOFOL 150 MG: 10 INJECTION, EMULSION INTRAVENOUS at 07:37

## 2023-09-19 RX ADMIN — FENTANYL CITRATE 100 MCG: 50 INJECTION, SOLUTION INTRAMUSCULAR; INTRAVENOUS at 07:41

## 2023-09-19 RX ADMIN — DEXMEDETOMIDINE HYDROCHLORIDE 20 MCG: 100 INJECTION, SOLUTION INTRAVENOUS at 08:09

## 2023-09-19 RX ADMIN — MELOXICAM 15 MG: 15 TABLET ORAL at 06:37

## 2023-09-19 RX ADMIN — LIDOCAINE HYDROCHLORIDE 100 MG: 20 INJECTION, SOLUTION INTRAVENOUS at 07:37

## 2023-09-19 RX ADMIN — CEFAZOLIN 2 G: 2 INJECTION, POWDER, FOR SOLUTION INTRAMUSCULAR; INTRAVENOUS at 07:43

## 2023-09-19 RX ADMIN — DEXMEDETOMIDINE HYDROCHLORIDE 20 MCG: 100 INJECTION, SOLUTION INTRAVENOUS at 08:53

## 2023-09-19 RX ADMIN — MIDAZOLAM 2 MG: 1 INJECTION INTRAMUSCULAR; INTRAVENOUS at 07:20

## 2023-09-19 RX ADMIN — PROPOFOL 100 MG: 10 INJECTION, EMULSION INTRAVENOUS at 07:40

## 2023-09-19 RX ADMIN — OXYCODONE HYDROCHLORIDE 10 MG: 10 TABLET, FILM COATED, EXTENDED RELEASE ORAL at 06:38
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
## 2023-09-19 NOTE — ANESTHESIA POSTPROCEDURE EVALUATION
Patient: Ellne Valladares    Procedure Summary       Date: 09/19/23 Room / Location: [facility] OR 11 / [facility] MAIN OR    Anesthesia Start: 0733 Anesthesia Stop: 0933    Procedure: TOTAL KNEE ARTHROPLASTY PARTIAL (Left: Knee) Diagnosis:       Primary osteoarthritis of left knee      (Primary osteoarthritis of left knee [M17.12])    Surgeons: Myron Carranza MD Provider: Jeferson Brown MD    Anesthesia Type: general ASA Status: 2            Anesthesia Type: general    Vitals  Vitals Value Taken Time   /70 09/19/23 1049   Temp 98 °F (36.7 °C) 09/19/23 1043   Pulse 69 09/19/23 1050   Resp 15 09/19/23 1049   SpO2 92 % 09/19/23 1050   Vitals shown include unvalidated device data.        Post Anesthesia Care and Evaluation    Patient location during evaluation: PACU  Patient participation: complete - patient participated  Level of consciousness: awake  Pain scale: See nurse's notes for pain score.  Pain management: adequate    Airway patency: patent  Anesthetic complications: No anesthetic complications  PONV Status: none  Cardiovascular status: acceptable  Respiratory status: acceptable and spontaneous ventilation  Hydration status: acceptable    Comments: Patient seen and examined postoperatively; vital signs stable; SpO2 greater than or equal to 90%; cardiopulmonary status stable; nausea/vomiting adequately controlled; pain adequately controlled; no apparent anesthesia complications; patient discharged from anesthesia care when discharge criteria were met

## 2023-09-19 NOTE — ANESTHESIA PROCEDURE NOTES
Airway  Urgency: elective    Date/Time: 9/19/2023 7:38 AM  Airway not difficult    General Information and Staff    Patient location during procedure: OR  Anesthesiologist: Jeferson Brown MD  CRNA/CAA: Sofya Howe CRNA    Indications and Patient Condition  Indications for airway management: airway protection    Preoxygenated: yes  MILS maintained throughout  Mask difficulty assessment: 0 - not attempted    Final Airway Details  Final airway type: supraglottic airway      Successful airway: I-gel  Size 4     Number of attempts at approach: 1  Assessment: lips, teeth, and gum same as pre-op and atraumatic intubation
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
## 2023-09-19 NOTE — ANESTHESIA PROCEDURE NOTES
Peripheral Block      Patient reassessed immediately prior to procedure    Patient location during procedure: pre-op  Start time: 9/19/2023 7:20 AM  Stop time: 9/19/2023 7:25 AM  Reason for block: procedure for pain, at surgeon's request and post-op pain management  Performed by  Anesthesiologist: Jeferson Brown MD  Preanesthetic Checklist  Completed: patient identified, IV checked, site marked, risks and benefits discussed, surgical consent, monitors and equipment checked, pre-op evaluation and timeout performed  Prep:  Pt Position: supine  Sterile barriers:gloves, sterile barriers and cap  Prep: ChloraPrep  Patient monitoring: blood pressure monitoring, continuous pulse oximetry and EKG  Procedure    Sedation: yes  Performed under: PNB  Guidance:ultrasound guided    ULTRASOUND INTERPRETATION.  Using ultrasound guidance a 20 G gauge needle was placed in close proximity to the brachial plexus nerve, at which point, under ultrasound guidance anesthetic was injected in the area of the nerve and spread of the anesthesia was seen on ultrasound in close proximity thereto.  There were no abnormalities seen on ultrasound; a digital image was taken; and the patient tolerated the procedure with no complications. Images:still images obtained, printed/placed on chart    Laterality:leftInjection Technique:single-shot  Needle Type:echogenic  Needle Gauge:20 G  Resistance on Injection: none  Sedation medications used: midazolam (VERSED) injection - Intravenous   2 mg - 9/19/2023 7:20:00 AM  Medications Used: dexamethasone (DECADRON) injection - Injection   4 mg - 9/19/2023 7:20:00 AM  ropivacaine (NAROPIN) 0.5 % injection - Perineural   30 mL - 9/19/2023 7:20:00 AM      Post Assessment  Injection Assessment: negative aspiration for heme, no paresthesia on injection and incremental injection  Patient Tolerance:comfortable throughout block  Complications:no  Additional Notes  25 ml of 0.5% Ropivicaine plus Decadron 4 ml. No  problem with block Block placed without problems

## 2023-09-19 NOTE — PLAN OF CARE
Goal Outcome Evaluation:  Plan of Care Reviewed With: patient, significant other           Outcome Evaluation: Re-eval completed post-op L TKA.  L knee AROM: -5 to 87 degrees.  Pt required Min A for bed mobility and CGA for transfers with RW. Pt ambulated 75' with RW with CGA/SBA, became nauseated mid-way through ambulation and RN clipped Queasy to pt's gown.  Pt navigated 4 steps with CGA/Min A with min/mod verbal cues for technique.  Reviewed L TKA exercises.  Alerted RN that pt will need RW for home use.  Pt is cleared from PT standpoint for d/c home this date.      Anticipated Discharge Disposition (PT): home with assist, home with home health, home with outpatient therapy services

## 2023-09-19 NOTE — OP NOTE
TOTAL KNEE ARTHROPLASTY OPERATIVE     PATIENT NAME:Ellen Valladares     YOB: 1975     ATTENDING PHYSICIAN: Myron Carranza MD     DATE OF PROCEDURE: 9/19/2023     PREOPERATIVE DIAGNOSIS: Severe degenerative osteoarthritis, medial compartment, left knee.    POSTOPERATIVE DIAGNOSIS: Post-Op Diagnosis Codes:     * Primary osteoarthritis of left knee [M17.12]    PRINCIPAL DIAGNOSIS: Severe medial compartment osteoarthritis left knee.    PROCEDURE: Left partial medial compartment  knee arthroplasty.    Surgical Approach: Knee Mid-Vastus     SURGEON:  Myron Carranza M.D.    ASSISTANT: first Lynn assistant was responsible for performing the following activities: Retraction, Suction, Irrigation, Suturing, Closing, and Placing Dressing and their skilled assistance was necessary for the success of this case.       ANESTHESIOLOGIST: Dr. Carlos Brown MD    ANESTHESIA: Adductor canal block for postop pain control followed by general anesthesia.    POSITION: Supine on the operating room table.    DRAINS: None.    COMPLICATIONS: None.    ESTIMATED BLOOD LOSS: 100ml    IMPLANT USED: Hue Persona partial medial compartment knee replacement system, #4 distal medial femoral component, number D tibia with a 8 mm thick MC, medially constrained Vitamin E impregnated polyethylene insert.  SPECIMENS:   Order Name Source Comment Collection Info Order Time   PREGNANCY, URINE Urine, Clean Catch  Collected By: Liv Fortune RN 9/19/2023  5:59 AM     Release to patient   Routine Release        HEMOGLOBIN AND HEMATOCRIT, BLOOD    9/19/2023  7:38 AM     Release to patient   Routine Release            Please note: We used the Alicia robotic arm system intraoperatively for appropriate soft tissue balancing, ligamentous balancing and alignment of the bony cuts.    INDICATION: The patient has been having very significant pain and discomfort in the knee.  We had scheduled the patient for total knee replacement after all forms of  conservative nonoperative care had failed to provide adequate relief of symptoms.  Patient understood all the risks and benefits which were discussed in great detail including the possibility of death, infection, myocardial infarction, DVT, pulmonary embolism, stiffness of the knee, wound infection and breakdown, possibility of revision surgery, neurovascular compromise, pneumonia, pulmonary embolism      DETAILS OF PROCEDURE: Surgical timeout was called. Operative extremity was correctly identified in the operating room suite. Patient was placed under appropriate anesthetic.  Patient was administered IV antibiotics per [facility] protocol and hospital policy. The patient’s operative extremity was correctly identified in the operating room suite.A Tourniquet was applied after the leg has been exsanguinated. The correctly identified extremity was prepped and draped in a standard fashion.      An anterior approach was performed and a quad sparing, subvastus approach was carried out. The patellofemoral ligament was resected. Medial soft tissue release was carried out on the medial face of the tibia to balance the soft tissues and to release the contracture of the knee MCL. Tibial Osteophytes were resected. Severe bone-on-bone appearance was noted.  Only the medial compartment was involved.  The patellofemoral joint was well preserved.  The lateral compartment of the knee joint was also well preserved.  The ACL was taut and intact.  At this point it was decided to perform a partial medial compartmental knee replacement as discussed with the patient in the preoperative area.  A thorough synovectomy was carried out. The Synovium was thickened and hypertrophic. The ACL, PCL and MCL were carefully protected throughout the entire procedure.  The extramedullary tibial guide was used.  The orientation of the tibial cut was made.  We resected 4 mm of bone from the subchondral region of the proximal medial tibial plateau.  The ACL was  carefully protected through the entire procedure.  Flexion and extension gaps were checked.  We used the sizing guide and a number D tibia was found to be the best fit resting nicely on the cortical margins of the resected metaphyseal bone of the proximal tibia.  Attention was then directed towards the distal aspect of the medial femoral condyle.  The distal cut was made first with the jig.  This was followed by making the posterior cut and the chamfer cuts.  We made sure to leave 2 mm of extra-articular cartilage and cancellous bone proximal to the anterior flange of the femoral trial.  The lug holes were drilled both of the femur and the tibia.  A trial reduction was carried out and an 8 mm thick polyethylene insert was found to the best fit.  On the table, the trials could be flexed to 125 degrees passively without any signs of impingement or liftoff.  The posterior capsular structures and the subperiosteal ligamentous insertions were infiltrated with Exparel as a local anesthetic.    The cancellous bone was lavaged with a Pulse lavage  and dried.  We also used diluted Betadine as an antiseptic rinse solution.  Cement was mixed on the back table. Components were cemented into position sequentially, starting with the number D partial medial tibial component and going to the number for distal femoral medial femur component.  The excess amounts of bone cement were removed from the bone-metal interface. Trial reduction was carried out once the cement was fully cured. A 8 mm tibial polyethylene insert, fixed-bearing design insert, was then locked into position on the tibial tray. Wound was lavaged with antibiotic containing irrigating solution. Tourniquet was deflated, hemostasis achieved. An analgesic cocktail was injected intra-articularly into the joint capsule and ligamentous insertions on bone for post op pain relief. The arthrotomy was repaired in 60 degrees of flexion. Subcutaneous sutures were  applied. Occlusive dressing was applied. No complications were encountered. Sponge count and needle count were correct. The patient was reversed from anesthesia and taken from the operating room to the recovery room in stable condition. I discussed the satisfactory performance of this procedure with the patient’s family and answered all questions for them.       Myron Carranza MD  9/19/2023  12:08 EDT
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
## 2023-09-26 ENCOUNTER — OFFICE VISIT (OUTPATIENT)
Dept: ORTHOPEDIC SURGERY | Facility: CLINIC | Age: 48
End: 2023-09-26
Payer: MEDICAID

## 2023-09-26 VITALS — BODY MASS INDEX: 32.32 KG/M2 | WEIGHT: 194 LBS | OXYGEN SATURATION: 98 % | RESPIRATION RATE: 20 BRPM | HEIGHT: 65 IN

## 2023-09-26 DIAGNOSIS — Z96.652 STATUS POST LEFT PARTIAL KNEE REPLACEMENT: Primary | ICD-10-CM

## 2023-09-26 PROCEDURE — 99024 POSTOP FOLLOW-UP VISIT: CPT | Performed by: NURSE PRACTITIONER

## 2023-09-26 RX ORDER — HYDROCODONE BITARTRATE AND ACETAMINOPHEN 7.5; 325 MG/1; MG/1
TABLET ORAL
Qty: 45 TABLET | Refills: 0 | Status: CANCELLED | OUTPATIENT
Start: 2023-09-26

## 2023-09-26 RX ORDER — HYDROCODONE BITARTRATE AND ACETAMINOPHEN 7.5; 325 MG/1; MG/1
TABLET ORAL
Qty: 45 TABLET | Refills: 0 | Status: SHIPPED | OUTPATIENT
Start: 2023-09-26 | End: 2023-09-26 | Stop reason: SDUPTHER

## 2023-09-26 RX ORDER — HYDROCODONE BITARTRATE AND ACETAMINOPHEN 7.5; 325 MG/1; MG/1
TABLET ORAL
Qty: 45 TABLET | Refills: 0 | Status: SHIPPED | OUTPATIENT
Start: 2023-09-26
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
## 2023-10-10 ENCOUNTER — TELEPHONE (OUTPATIENT)
Dept: ORTHOPEDIC SURGERY | Facility: CLINIC | Age: 48
End: 2023-10-10
Payer: MEDICAID

## 2023-10-10 NOTE — TELEPHONE ENCOUNTER
Attempted to contact the patient to confirm her pharmacy via telephone and [employer] message.     Dr. Carranza is wanting to send in antibiotics.     Okay for hub to read

## 2023-10-10 NOTE — TELEPHONE ENCOUNTER
----- Message from Satish Negron MA sent at 10/9/2023  4:20 PM EDT -----  Regarding: FW: Might need antibiotics   Contact: [phone]  Please see message   Thank you   ----- Message -----  From: Ellen Valladares  Sent: 10/9/2023   4:18 PM EDT  To: [facility]  Subject: Might need antibiotics                           The last few days my leg has been hurting alot more than it was and seems to be red an hot to touch above and below my knee. Since Friday it seems to be getting worse. Before that it was better. Not sure what I did or didnt do. But I think I need an antibiotic cause I can hardly sleep or move it. I have been in bed all weekend. Please help

## 2023-10-11 RX ORDER — DOXYCYCLINE HYCLATE 100 MG/1
100 CAPSULE ORAL 2 TIMES DAILY
Qty: 20 CAPSULE | Refills: 0 | Status: SHIPPED | OUTPATIENT
Start: 2023-10-11

## 2023-10-11 RX ORDER — SULFAMETHOXAZOLE AND TRIMETHOPRIM 800; 160 MG/1; MG/1
1 TABLET ORAL 2 TIMES DAILY
Qty: 20 TABLET | Refills: 0 | Status: SHIPPED | OUTPATIENT
Start: 2023-10-11

## 2023-10-13 LAB
QT INTERVAL: 390 MS
QTC INTERVAL: 470 MS

## 2023-10-16 ENCOUNTER — HOSPITAL ENCOUNTER (EMERGENCY)
Facility: HOSPITAL | Age: 48
Discharge: LEFT BEFORE DISCHARGE | End: 2023-10-16
Attending: EMERGENCY MEDICINE
Payer: MEDICAID

## 2023-10-16 VITALS — OXYGEN SATURATION: 95 %

## 2023-10-16 PROCEDURE — 99211 OFF/OP EST MAY X REQ PHY/QHP: CPT | Performed by: EMERGENCY MEDICINE

## 2023-10-17 ENCOUNTER — TELEPHONE (OUTPATIENT)
Dept: ORTHOPEDIC SURGERY | Facility: CLINIC | Age: 48
End: 2023-10-17

## 2023-10-17 ENCOUNTER — OFFICE VISIT (OUTPATIENT)
Dept: ORTHOPEDIC SURGERY | Facility: CLINIC | Age: 48
End: 2023-10-17
Payer: MEDICAID

## 2023-10-17 ENCOUNTER — LAB (OUTPATIENT)
Dept: LAB | Facility: HOSPITAL | Age: 48
End: 2023-10-17
Payer: MEDICAID

## 2023-10-17 VITALS — OXYGEN SATURATION: 98 % | WEIGHT: 194 LBS | RESPIRATION RATE: 20 BRPM | BODY MASS INDEX: 32.32 KG/M2 | HEIGHT: 65 IN

## 2023-10-17 DIAGNOSIS — S81.002A OPEN WOUND OF LEFT KNEE, INITIAL ENCOUNTER: Primary | ICD-10-CM

## 2023-10-17 DIAGNOSIS — S81.002A OPEN WOUND OF LEFT KNEE, INITIAL ENCOUNTER: ICD-10-CM

## 2023-10-17 PROCEDURE — 87077 CULTURE AEROBIC IDENTIFY: CPT

## 2023-10-17 PROCEDURE — 87205 SMEAR GRAM STAIN: CPT

## 2023-10-17 PROCEDURE — 87186 SC STD MICRODIL/AGAR DIL: CPT

## 2023-10-17 PROCEDURE — 87070 CULTURE OTHR SPECIMN AEROBIC: CPT

## 2023-10-17 PROCEDURE — 99024 POSTOP FOLLOW-UP VISIT: CPT | Performed by: NURSE PRACTITIONER

## 2023-10-17 RX ORDER — DOXYCYCLINE 100 MG/1
1 CAPSULE ORAL EVERY 12 HOURS SCHEDULED
COMMUNITY
Start: 2023-10-11
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
## 2023-10-17 NOTE — TELEPHONE ENCOUNTER
Called patient.   I&D planned for 1200 tomorrow  Arrive at [facility] at 1000  NPO after MN  Will need a     Patient v/u

## 2023-10-18 ENCOUNTER — ANESTHESIA EVENT (OUTPATIENT)
Dept: PERIOP | Facility: HOSPITAL | Age: 48
End: 2023-10-18
Payer: MEDICAID

## 2023-10-18 ENCOUNTER — ANESTHESIA (OUTPATIENT)
Dept: PERIOP | Facility: HOSPITAL | Age: 48
End: 2023-10-18
Payer: MEDICAID

## 2023-10-18 ENCOUNTER — HOSPITAL ENCOUNTER (OUTPATIENT)
Facility: HOSPITAL | Age: 48
Setting detail: HOSPITAL OUTPATIENT SURGERY
Discharge: HOME | End: 2023-10-18
Attending: ORTHOPAEDIC SURGERY | Admitting: ORTHOPAEDIC SURGERY
Payer: MEDICAID

## 2023-10-18 VITALS
TEMPERATURE: 97.7 F | SYSTOLIC BLOOD PRESSURE: 126 MMHG | HEIGHT: 65 IN | RESPIRATION RATE: 14 BRPM | OXYGEN SATURATION: 100 % | DIASTOLIC BLOOD PRESSURE: 73 MMHG | HEART RATE: 70 BPM | WEIGHT: 183 LBS | BODY MASS INDEX: 30.49 KG/M2

## 2023-10-18 DIAGNOSIS — T14.8XXA WOUND INFECTION: ICD-10-CM

## 2023-10-18 DIAGNOSIS — L08.9 WOUND INFECTION: ICD-10-CM

## 2023-10-18 DIAGNOSIS — Z96.652 STATUS POST LEFT PARTIAL KNEE REPLACEMENT: Primary | ICD-10-CM

## 2023-10-18 LAB
ANION GAP SERPL CALCULATED.3IONS-SCNC: 10 MMOL/L (ref 5–15)
B-HCG UR QL: NEGATIVE
BUN SERPL-MCNC: 10 MG/DL (ref 6–20)
BUN/CREAT SERPL: 16.1 (ref 7–25)
CALCIUM SPEC-SCNC: 9.4 MG/DL (ref 8.6–10.5)
CHLORIDE SERPL-SCNC: 101 MMOL/L (ref 98–107)
CO2 SERPL-SCNC: 23 MMOL/L (ref 22–29)
CREAT SERPL-MCNC: 0.62 MG/DL (ref 0.57–1)
DEPRECATED RDW RBC AUTO: 43.8 FL (ref 37–54)
EGFRCR SERPLBLD CKD-EPI 2021: 108.6 ML/MIN/1.73
ERYTHROCYTE [DISTWIDTH] IN BLOOD BY AUTOMATED COUNT: 16.3 % (ref 12.3–15.4)
GLUCOSE SERPL-MCNC: 114 MG/DL (ref 65–99)
HCT VFR BLD AUTO: 35.6 % (ref 34–46.6)
HGB BLD-MCNC: 11.2 G/DL (ref 12–15.9)
MCH RBC QN AUTO: 23 PG (ref 26.6–33)
MCHC RBC AUTO-ENTMCNC: 31.6 G/DL (ref 31.5–35.7)
MCV RBC AUTO: 72.8 FL (ref 79–97)
PLATELET # BLD AUTO: 476 10*3/MM3 (ref 140–450)
PMV BLD AUTO: 8.1 FL (ref 6–12)
POTASSIUM SERPL-SCNC: 4.3 MMOL/L (ref 3.5–5.2)
RBC # BLD AUTO: 4.88 10*6/MM3 (ref 3.77–5.28)
SODIUM SERPL-SCNC: 134 MMOL/L (ref 136–145)
WBC NRBC COR # BLD: 9.7 10*3/MM3 (ref 3.4–10.8)

## 2023-10-18 PROCEDURE — 87070 CULTURE OTHR SPECIMN AEROBIC: CPT | Performed by: ORTHOPAEDIC SURGERY

## 2023-10-18 PROCEDURE — S0260 H&P FOR SURGERY: HCPCS | Performed by: ORTHOPAEDIC SURGERY

## 2023-10-18 PROCEDURE — 87205 SMEAR GRAM STAIN: CPT | Performed by: ORTHOPAEDIC SURGERY

## 2023-10-18 PROCEDURE — 25010000002 CEFAZOLIN PER 500 MG: Performed by: ORTHOPAEDIC SURGERY

## 2023-10-18 PROCEDURE — 80048 BASIC METABOLIC PNL TOTAL CA: CPT | Performed by: ORTHOPAEDIC SURGERY

## 2023-10-18 PROCEDURE — 87075 CULTR BACTERIA EXCEPT BLOOD: CPT | Performed by: ORTHOPAEDIC SURGERY

## 2023-10-18 PROCEDURE — 25010000002 HYDROMORPHONE 1 MG/ML SOLUTION: Performed by: NURSE ANESTHETIST, CERTIFIED REGISTERED

## 2023-10-18 PROCEDURE — 11042 DBRDMT SUBQ TIS 1ST 20SQCM/<: CPT | Performed by: ORTHOPAEDIC SURGERY

## 2023-10-18 PROCEDURE — 25010000002 PROPOFOL 200 MG/20ML EMULSION: Performed by: NURSE ANESTHETIST, CERTIFIED REGISTERED

## 2023-10-18 PROCEDURE — 85027 COMPLETE CBC AUTOMATED: CPT | Performed by: ORTHOPAEDIC SURGERY

## 2023-10-18 PROCEDURE — 25810000003 LACTATED RINGERS PER 1000 ML: Performed by: ORTHOPAEDIC SURGERY

## 2023-10-18 PROCEDURE — 25010000002 KETOROLAC TROMETHAMINE PER 15 MG: Performed by: ANESTHESIOLOGY

## 2023-10-18 PROCEDURE — C9290 INJ, BUPIVACAINE LIPOSOME: HCPCS | Performed by: ORTHOPAEDIC SURGERY

## 2023-10-18 PROCEDURE — 87186 SC STD MICRODIL/AGAR DIL: CPT | Performed by: ORTHOPAEDIC SURGERY

## 2023-10-18 PROCEDURE — 25010000002 ONDANSETRON PER 1 MG: Performed by: NURSE ANESTHETIST, CERTIFIED REGISTERED

## 2023-10-18 PROCEDURE — 25010000002 FENTANYL CITRATE (PF) 100 MCG/2ML SOLUTION: Performed by: NURSE ANESTHETIST, CERTIFIED REGISTERED

## 2023-10-18 PROCEDURE — 0 BUPIVACAINE LIPOSOME 1.3 % SUSPENSION: Performed by: ORTHOPAEDIC SURGERY

## 2023-10-18 PROCEDURE — 25010000002 FENTANYL CITRATE (PF) 50 MCG/ML SOLUTION: Performed by: NURSE ANESTHETIST, CERTIFIED REGISTERED

## 2023-10-18 PROCEDURE — 87176 TISSUE HOMOGENIZATION CULTR: CPT | Performed by: ORTHOPAEDIC SURGERY

## 2023-10-18 PROCEDURE — 81025 URINE PREGNANCY TEST: CPT | Performed by: ORTHOPAEDIC SURGERY

## 2023-10-18 PROCEDURE — 25010000002 DEXAMETHASONE PER 1 MG: Performed by: NURSE ANESTHETIST, CERTIFIED REGISTERED

## 2023-10-18 PROCEDURE — 87077 CULTURE AEROBIC IDENTIFY: CPT | Performed by: ORTHOPAEDIC SURGERY

## 2023-10-18 RX ORDER — HYDROCODONE BITARTRATE AND ACETAMINOPHEN 5; 325 MG/1; MG/1
1 TABLET ORAL ONCE AS NEEDED
Status: COMPLETED | OUTPATIENT
Start: 2023-10-18 | End: 2023-10-18

## 2023-10-18 RX ORDER — HYDROCODONE BITARTRATE AND ACETAMINOPHEN 5; 325 MG/1; MG/1
1 TABLET ORAL EVERY 4 HOURS PRN
Qty: 45 TABLET | Refills: 0 | Status: SHIPPED | OUTPATIENT
Start: 2023-10-18 | End: 2023-10-18 | Stop reason: SDUPTHER

## 2023-10-18 RX ORDER — LABETALOL HYDROCHLORIDE 5 MG/ML
5 INJECTION, SOLUTION INTRAVENOUS
Status: DISCONTINUED | OUTPATIENT
Start: 2023-10-18 | End: 2023-10-18 | Stop reason: HOSPADM

## 2023-10-18 RX ORDER — KETOROLAC TROMETHAMINE 30 MG/ML
30 INJECTION, SOLUTION INTRAMUSCULAR; INTRAVENOUS ONCE
Status: COMPLETED | OUTPATIENT
Start: 2023-10-18 | End: 2023-10-18

## 2023-10-18 RX ORDER — HYDROCODONE BITARTRATE AND ACETAMINOPHEN 5; 325 MG/1; MG/1
1 TABLET ORAL EVERY 4 HOURS PRN
Qty: 45 TABLET | Refills: 0 | Status: SHIPPED | OUTPATIENT
Start: 2023-10-18

## 2023-10-18 RX ORDER — ONDANSETRON 2 MG/ML
INJECTION INTRAMUSCULAR; INTRAVENOUS AS NEEDED
Status: DISCONTINUED | OUTPATIENT
Start: 2023-10-18 | End: 2023-10-18 | Stop reason: SURG

## 2023-10-18 RX ORDER — HYDRALAZINE HYDROCHLORIDE 20 MG/ML
5 INJECTION INTRAMUSCULAR; INTRAVENOUS
Status: DISCONTINUED | OUTPATIENT
Start: 2023-10-18 | End: 2023-10-18 | Stop reason: HOSPADM

## 2023-10-18 RX ORDER — SODIUM CHLORIDE 0.9 % (FLUSH) 0.9 %
10 SYRINGE (ML) INJECTION AS NEEDED
Status: DISCONTINUED | OUTPATIENT
Start: 2023-10-18 | End: 2023-10-18 | Stop reason: HOSPADM

## 2023-10-18 RX ORDER — LIDOCAINE HYDROCHLORIDE 10 MG/ML
0.5 INJECTION, SOLUTION INFILTRATION; PERINEURAL ONCE AS NEEDED
Status: DISCONTINUED | OUTPATIENT
Start: 2023-10-18 | End: 2023-10-18 | Stop reason: HOSPADM

## 2023-10-18 RX ORDER — HYDROCODONE BITARTRATE AND ACETAMINOPHEN 5; 325 MG/1; MG/1
1-2 TABLET ORAL EVERY 4 HOURS PRN
Qty: 40 TABLET | Refills: 0 | Status: SHIPPED | OUTPATIENT
Start: 2023-10-18 | End: 2023-10-18 | Stop reason: HOSPADM

## 2023-10-18 RX ORDER — DEXAMETHASONE SODIUM PHOSPHATE 4 MG/ML
INJECTION, SOLUTION INTRA-ARTICULAR; INTRALESIONAL; INTRAMUSCULAR; INTRAVENOUS; SOFT TISSUE AS NEEDED
Status: DISCONTINUED | OUTPATIENT
Start: 2023-10-18 | End: 2023-10-18 | Stop reason: SURG

## 2023-10-18 RX ORDER — DROPERIDOL 2.5 MG/ML
0.62 INJECTION, SOLUTION INTRAMUSCULAR; INTRAVENOUS ONCE AS NEEDED
Status: DISCONTINUED | OUTPATIENT
Start: 2023-10-18 | End: 2023-10-18 | Stop reason: HOSPADM

## 2023-10-18 RX ORDER — FLUMAZENIL 0.1 MG/ML
0.2 INJECTION INTRAVENOUS AS NEEDED
Status: DISCONTINUED | OUTPATIENT
Start: 2023-10-18 | End: 2023-10-18 | Stop reason: HOSPADM

## 2023-10-18 RX ORDER — NALOXONE HCL 0.4 MG/ML
0.4 VIAL (ML) INJECTION AS NEEDED
Status: DISCONTINUED | OUTPATIENT
Start: 2023-10-18 | End: 2023-10-18 | Stop reason: HOSPADM

## 2023-10-18 RX ORDER — ACETAMINOPHEN 325 MG/1
650 TABLET ORAL ONCE AS NEEDED
Status: DISCONTINUED | OUTPATIENT
Start: 2023-10-18 | End: 2023-10-18 | Stop reason: HOSPADM

## 2023-10-18 RX ORDER — SODIUM CHLORIDE, SODIUM LACTATE, POTASSIUM CHLORIDE, CALCIUM CHLORIDE 600; 310; 30; 20 MG/100ML; MG/100ML; MG/100ML; MG/100ML
1000 INJECTION, SOLUTION INTRAVENOUS CONTINUOUS
Status: DISCONTINUED | OUTPATIENT
Start: 2023-10-18 | End: 2023-10-18 | Stop reason: HOSPADM

## 2023-10-18 RX ORDER — FENTANYL CITRATE 50 UG/ML
50 INJECTION, SOLUTION INTRAMUSCULAR; INTRAVENOUS
Status: DISCONTINUED | OUTPATIENT
Start: 2023-10-18 | End: 2023-10-18 | Stop reason: HOSPADM

## 2023-10-18 RX ORDER — PROPOFOL 10 MG/ML
INJECTION, EMULSION INTRAVENOUS AS NEEDED
Status: DISCONTINUED | OUTPATIENT
Start: 2023-10-18 | End: 2023-10-18 | Stop reason: SURG

## 2023-10-18 RX ORDER — FENTANYL CITRATE 50 UG/ML
INJECTION, SOLUTION INTRAMUSCULAR; INTRAVENOUS AS NEEDED
Status: DISCONTINUED | OUTPATIENT
Start: 2023-10-18 | End: 2023-10-18 | Stop reason: SURG

## 2023-10-18 RX ORDER — LIDOCAINE HYDROCHLORIDE 20 MG/ML
INJECTION, SOLUTION EPIDURAL; INFILTRATION; INTRACAUDAL; PERINEURAL AS NEEDED
Status: DISCONTINUED | OUTPATIENT
Start: 2023-10-18 | End: 2023-10-18 | Stop reason: SURG

## 2023-10-18 RX ORDER — ACETAMINOPHEN 650 MG/1
325 SUPPOSITORY RECTAL EVERY 4 HOURS PRN
Status: DISCONTINUED | OUTPATIENT
Start: 2023-10-18 | End: 2023-10-18 | Stop reason: HOSPADM

## 2023-10-18 RX ORDER — ONDANSETRON 2 MG/ML
4 INJECTION INTRAMUSCULAR; INTRAVENOUS ONCE AS NEEDED
Status: DISCONTINUED | OUTPATIENT
Start: 2023-10-18 | End: 2023-10-18 | Stop reason: HOSPADM

## 2023-10-18 RX ORDER — EPHEDRINE SULFATE 5 MG/ML
5 INJECTION INTRAVENOUS ONCE AS NEEDED
Status: DISCONTINUED | OUTPATIENT
Start: 2023-10-18 | End: 2023-10-18 | Stop reason: HOSPADM

## 2023-10-18 RX ORDER — IPRATROPIUM BROMIDE AND ALBUTEROL SULFATE 2.5; .5 MG/3ML; MG/3ML
3 SOLUTION RESPIRATORY (INHALATION) ONCE AS NEEDED
Status: DISCONTINUED | OUTPATIENT
Start: 2023-10-18 | End: 2023-10-18 | Stop reason: HOSPADM

## 2023-10-18 RX ADMIN — FENTANYL CITRATE 25 MCG: 50 INJECTION, SOLUTION INTRAMUSCULAR; INTRAVENOUS at 11:49

## 2023-10-18 RX ADMIN — DEXAMETHASONE SODIUM PHOSPHATE 8 MG: 4 INJECTION, SOLUTION INTRAMUSCULAR; INTRAVENOUS at 11:57

## 2023-10-18 RX ADMIN — FENTANYL CITRATE 50 MCG: 50 INJECTION, SOLUTION INTRAMUSCULAR; INTRAVENOUS at 12:58

## 2023-10-18 RX ADMIN — CEFAZOLIN 2000 MG: 2 INJECTION, POWDER, FOR SOLUTION INTRAMUSCULAR; INTRAVENOUS at 12:07

## 2023-10-18 RX ADMIN — HYDROCODONE BITARTRATE AND ACETAMINOPHEN 1 TABLET: 5; 325 TABLET ORAL at 13:15

## 2023-10-18 RX ADMIN — SODIUM CHLORIDE, POTASSIUM CHLORIDE, SODIUM LACTATE AND CALCIUM CHLORIDE 1000 ML: 600; 310; 30; 20 INJECTION, SOLUTION INTRAVENOUS at 10:38

## 2023-10-18 RX ADMIN — HYDROMORPHONE HYDROCHLORIDE 0.5 MG: 1 INJECTION, SOLUTION INTRAMUSCULAR; INTRAVENOUS; SUBCUTANEOUS at 12:28

## 2023-10-18 RX ADMIN — HYDROMORPHONE HYDROCHLORIDE 0.5 MG: 1 INJECTION, SOLUTION INTRAMUSCULAR; INTRAVENOUS; SUBCUTANEOUS at 12:29

## 2023-10-18 RX ADMIN — FENTANYL CITRATE 50 MCG: 50 INJECTION, SOLUTION INTRAMUSCULAR; INTRAVENOUS at 13:17

## 2023-10-18 RX ADMIN — FENTANYL CITRATE 25 MCG: 50 INJECTION, SOLUTION INTRAMUSCULAR; INTRAVENOUS at 11:55

## 2023-10-18 RX ADMIN — KETOROLAC TROMETHAMINE 30 MG: 30 INJECTION, SOLUTION INTRAMUSCULAR; INTRAVENOUS at 13:45

## 2023-10-18 RX ADMIN — PROPOFOL 200 MG: 10 INJECTION, EMULSION INTRAVENOUS at 11:49

## 2023-10-18 RX ADMIN — ONDANSETRON 4 MG: 2 INJECTION INTRAMUSCULAR; INTRAVENOUS at 11:57

## 2023-10-18 RX ADMIN — LIDOCAINE HYDROCHLORIDE 60 MG: 20 INJECTION, SOLUTION EPIDURAL; INFILTRATION; INTRACAUDAL; PERINEURAL at 11:49

## 2023-10-18 RX ADMIN — FENTANYL CITRATE 25 MCG: 50 INJECTION, SOLUTION INTRAMUSCULAR; INTRAVENOUS at 11:59

## 2023-10-18 RX ADMIN — FENTANYL CITRATE 25 MCG: 50 INJECTION, SOLUTION INTRAMUSCULAR; INTRAVENOUS at 12:07
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
## 2023-10-18 NOTE — ANESTHESIA PREPROCEDURE EVALUATION
Anesthesia Evaluation     no history of anesthetic complications:   NPO Solid Status: > 8 hours  NPO Liquid Status: > 8 hours           Airway   Mallampati: II  TM distance: >3 FB  Neck ROM: full  No difficulty expected  Dental - normal exam     Pulmonary - normal exam    breath sounds clear to auscultation  (+) a smoker Former Abstained day of surgery,  Cardiovascular - normal exam    Rhythm: regular  Rate: normal    (+) valvular problems/murmurs murmur, PVD, hyperlipidemia      Neuro/Psych  (+) headaches, numbness, psychiatric history Anxiety and Depression  GI/Hepatic/Renal/Endo    (+) obesity    Musculoskeletal     Abdominal  - normal exam   Substance History      OB/GYN          Other   arthritis,                 Anesthesia Plan    ASA 2     general     intravenous induction     Anesthetic plan, risks, benefits, and alternatives have been provided, discussed and informed consent has been obtained with: patient.    Plan discussed with CRNA.    CODE STATUS:

## 2023-10-18 NOTE — ANESTHESIA PROCEDURE NOTES
Airway  Urgency: elective    Date/Time: 10/18/2023 11:50 AM  Airway not difficult    General Information and Staff    Patient location during procedure: OR  Anesthesiologist: Jeferson Calhoun MD  CRNA/CAA: Joanie Rooney CRNA    Indications and Patient Condition  Indications for airway management: airway protection    Preoxygenated: yes (Pt pre-O2 with 100% O2)  Mask difficulty assessment: 0 - not attempted    Final Airway Details  Final airway type: supraglottic airway      Successful airway: I-gel  Size 4     Number of attempts at approach: 1  Assessment: lips, teeth, and gum same as pre-op and atraumatic intubation    Additional Comments  ATOLMA x1. No change in dentition. + ETCO2. Airway seal pressure <20cm H2O.

## 2023-10-18 NOTE — ANESTHESIA POSTPROCEDURE EVALUATION
Patient: Ellen Valladares    Procedure Summary       Date: 10/18/23 Room / Location: [facility] OR 06 / [facility] MAIN OR    Anesthesia Start: 1144 Anesthesia Stop: 1240    Procedure: INCISION AND DRAINAGE WOUND KNEE (Left: Knee) Diagnosis:     Surgeons: Myron Carranza MD Provider: Jeferson Calhoun MD    Anesthesia Type: general ASA Status: 2            Anesthesia Type: general    Vitals  Vitals Value Taken Time   /62 10/18/23 1358   Temp 97.9 °F (36.6 °C) 10/18/23 1358   Pulse 67 10/18/23 1402   Resp 11 10/18/23 1358   SpO2 95 % 10/18/23 1402   Vitals shown include unfiled device data.        Post Anesthesia Care and Evaluation    Patient location during evaluation: PACU  Patient participation: complete - patient participated  Level of consciousness: awake  Pain scale: See nurse's notes for pain score.  Pain management: adequate    Airway patency: patent  Anesthetic complications: No anesthetic complications  PONV Status: none  Cardiovascular status: acceptable  Respiratory status: acceptable and spontaneous ventilation  Hydration status: acceptable    Comments: Patient seen and examined postoperatively; vital signs stable; SpO2 greater than or equal to 90%; cardiopulmonary status stable; nausea/vomiting adequately controlled; pain adequately controlled; no apparent anesthesia complications; patient discharged from anesthesia care when discharge criteria were met

## 2023-10-18 NOTE — OP NOTE
PATIENT NAME: Ellen Valladares   PATIENT : 1975   DATE OF PROCEDURE: 10/18/2023   PRINCIPAL DIAGNOSIS: Wound dehiscence left knee status post left partial knee replacement.  SECONDARY DIAGNOSIS: Wound dehiscence left knee.    POSTOPERATIVE DIAGNOSIS: Post-Op Diagnosis Codes:  Same  PROCEDURE PERFORMED: Incision and drainage was secondary closure of left knee wound.  SURGEON: RIVER DE LA O M.D.  ASSISTANT: Charley Lucero first assistant was responsible for performing the following activities: Retraction, Suction, Irrigation, Suturing, Closing, and Placing Dressing and their skilled assistance was necessary for the success of this case.     ANESTHESIOLOGIST: Dr. Jeferson Calhoun MD  ANESTHESIA USED: General with an LMA.  ANALGESIC PROCEDURE USED: We used 10 cc of Exparel around the incision for postoperative pain control.  ESTIMATED BLOOD LOSS: minimal  SPECIMENS: Multiple cultures and sensitivities were obtained including tissue for cultures.  DRAINS: None  COMPLICATIONS: None  POSITION: Supine on the operating room table.     INDICATIONS: Patient had showed some wound dehiscence with superficial cellulitis and infection.  This patient has had a partial knee replacement on the medial compartment about 8 weeks ago.  She did very well till about 2 weeks ago when she started to develop a wound dehiscence.  She then was called in some oral antibiotics.  Unfortunately the patient did not want to look at that incision and subsequently it showed signs of wound dehiscence and infection.  Once we saw the patient yesterday in the office we booked her to go to surgery today for incision drainage and secondary closure of the incision.  We have discussed with the patient that she might require removal of the implants if the infection fails to settle down.  At this point the infection appears to be limited to the subcutaneous tissue of the prepatellar bursal area.  The patient and myself would both like to try implant retention  at this point since the infection appears to be superficial and not involving the implanted prosthetics.  Risks and benefits and need for surgical intervention to debride the wound, obtain cultures and obtain secondary closure were discussed with the patient in great detail. Accordingly an informed consent was signed.    PROCEDURE:  Patient was taken to the operating room and placed under appropriate anesthetic. The operative extremity was correctly identified in the operating room suite. The wound was prepped and draped in a standard fashion. The extremity was exsanguinated. Edges of the margin were debrided with a sharp knife.  The length of the wound dehiscence was about 5 cm x 2 cm.  The depth of the wound extended up to the subcutaneous tissue in the prepatellar region but not further than that in terms of tissue planes.  Cultures were obtained including superficial and deep cultures. Margins of necrotic tissue were removed with a sharp knife all the way down to subcutaneous tissue. Antibiotics were administered through the IV once the cultures had been obtained.  The wound was lavaged with 9 liters of Kefzol Irrigating solution. Tissue margins were debrided back to healthy appearing tissue. One gram of Nebcin powder was instilled into the wound for postoperative antibiotics. Wound was then closed up in layers and loosely approximated.  We used interrupted nylon sutures to approximate the skin margins.  Occlusive dressing was applied. Hemostasis was obtained. Patient was reversed from anesthetic and taken from the operating room to the recovery room in stable, satisfactory condition. No complications were encountered. I discussed the satisfactory performance of the procedure with the patient’s family and answered all questions for them.    Myron Carranza MD  10/18/2023  11:50 EDT

## 2023-10-19 LAB
BACTERIA SPEC AEROBE CULT: ABNORMAL
GRAM STN SPEC: ABNORMAL
GRAM STN SPEC: ABNORMAL

## 2023-10-20 LAB
BACTERIA SPEC AEROBE CULT: ABNORMAL
GRAM STN SPEC: ABNORMAL
GRAM STN SPEC: ABNORMAL

## 2023-10-23 LAB — BACTERIA SPEC ANAEROBE CULT: NORMAL

## 2023-10-25 ENCOUNTER — OFFICE VISIT (OUTPATIENT)
Dept: ORTHOPEDIC SURGERY | Facility: CLINIC | Age: 48
End: 2023-10-25
Payer: MEDICAID

## 2023-10-25 DIAGNOSIS — Z96.652 STATUS POST LEFT PARTIAL KNEE REPLACEMENT: Primary | ICD-10-CM

## 2023-10-25 PROCEDURE — 1160F RVW MEDS BY RX/DR IN RCRD: CPT | Performed by: ORTHOPAEDIC SURGERY

## 2023-10-25 PROCEDURE — 99024 POSTOP FOLLOW-UP VISIT: CPT | Performed by: ORTHOPAEDIC SURGERY

## 2023-10-25 PROCEDURE — 1159F MED LIST DOCD IN RCRD: CPT | Performed by: ORTHOPAEDIC SURGERY

## 2023-10-25 RX ORDER — DOXYCYCLINE HYCLATE 100 MG/1
100 CAPSULE ORAL 2 TIMES DAILY
Qty: 30 CAPSULE | Refills: 0 | Status: SHIPPED | OUTPATIENT
Start: 2023-10-25

## 2023-10-25 RX ORDER — OXYCODONE HYDROCHLORIDE AND ACETAMINOPHEN 5; 325 MG/1; MG/1
TABLET ORAL
Qty: 40 TABLET | Refills: 0 | Status: SHIPPED | OUTPATIENT
Start: 2023-10-25

## 2023-10-25 RX ORDER — SULFAMETHOXAZOLE AND TRIMETHOPRIM 800; 160 MG/1; MG/1
1 TABLET ORAL 2 TIMES DAILY
Qty: 30 TABLET | Refills: 0 | Status: SHIPPED | OUTPATIENT
Start: 2023-10-25

## 2023-10-25 NOTE — TELEPHONE ENCOUNTER
Unable to leave voicemail on patients voicemail.    Left message on the patients spouses voicemail to confirm pharmacy    I have sent the antibiotics to [facility]

## 2023-10-25 NOTE — PROGRESS NOTES
POST OPERATIVE FOLLOW UP (Global)      NAME: Ellen Valladares           : 1975    MRN: [id number]      Chief Complaint   Patient presents with    Left Knee - Post-op    Wound Check     Date of Surgery: 10/18/2023  ?     HPI  Ellen Valladares presents for 7 day postoperative follow up s/p left total knee arthroplasty incision and drainage performed by Myron Carranza MD. The patient has had a relatively normal postoperative course.  The patient has had no current complaints. The patient has had improving normal postoperative pain.  The patient has had no issues with the wound.         Physical Exam  General: alert, appears stated age, and cooperative  Incision/Skin: healing well, no drainage, no seroma, no swelling, incision well approximated  Musculoskeletal:   LEFT knee  Calf is soft and nontender with a negative Homans sign. Appropriate amounts of swelling and bruising are noted.  There is no clicking, popping or catching. There is no instability of the components.   Anterior and posterior drawer signs are negative.   Dorsalis pedis and posterior tibial artery pulses are palpable.   Common peroneal nerve function is well preserved.   Range of motion is 10-80 degrees of flexion.  Gait is cautious but otherwise fairly normal.       Diagnostic Data:  no diagnostic testing performed this visit       Assessment & Plan   Assessment:    1. Status post left partial knee replacement          Plan:      New Medications Ordered This Visit   Medications    doxycycline (VIBRAMYCIN) 100 MG capsule     Sig: Take 1 capsule by mouth 2 (Two) Times a Day.     Dispense:  30 capsule     Refill:  0    sulfamethoxazole-trimethoprim (Bactrim DS) 800-160 MG per tablet     Sig: Take 1 tablet by mouth 2 (Two) Times a Day.     Dispense:  30 tablet     Refill:  0     Wound care instructions given  Ice and/or modalities as beneficial  Watch for signs and symptoms of infection  Physical therapy program ongoing  Weight bearing parameters  reviewed  Take prescribed medications as instructed, but only as tolerated  Aftercare and dental prophylaxis for joint replacement surgery.  Discussion of orthopaedic goals and activities and patient and/or guardian expressed appreciation.  Regular exercise as tolerated  Continue Doxycycline and Bactrim for 1 week for antibiotic prophylaxis.  Follow up in 2 weeks for wound check and suture removal          There are no Patient Instructions on file for this visit.       Myron Carranza MD  10/25/2023       EMR Dragon/Transcription disclaimer:  Much of this encounter note is an electronic transcription/translation of spoken language to printed text. The electronic translation of spoken language may permit erroneous, or at times, nonsensical words or phrases to be inadvertently transcribed; Although I have reviewed the note for such errors, some may still exist.

## 2023-11-08 ENCOUNTER — OFFICE VISIT (OUTPATIENT)
Dept: ORTHOPEDIC SURGERY | Facility: CLINIC | Age: 48
End: 2023-11-08
Payer: MEDICAID

## 2023-11-08 DIAGNOSIS — Z96.652 STATUS POST LEFT PARTIAL KNEE REPLACEMENT: Primary | ICD-10-CM

## 2023-11-08 PROCEDURE — 1160F RVW MEDS BY RX/DR IN RCRD: CPT | Performed by: ORTHOPAEDIC SURGERY

## 2023-11-08 PROCEDURE — 99024 POSTOP FOLLOW-UP VISIT: CPT | Performed by: ORTHOPAEDIC SURGERY

## 2023-11-08 PROCEDURE — 1159F MED LIST DOCD IN RCRD: CPT | Performed by: ORTHOPAEDIC SURGERY

## 2023-11-08 NOTE — PROGRESS NOTES
"POST OPERATIVE FOLLOW UP (Global)      NAME: Ellen Valladares           : 1975    MRN: [id number]      Chief Complaint   Patient presents with    Left Knee - Post-op    Wound Check     Date of Surgery: ***  ?     HPI  Ellen Valladares presents for *** {TIME; DAY/WEEK/MONTH:88359} postoperative follow up s/p {Post op anatomical location:36404} performed by Myron Carranza MD. {postop course:00610::\"The patient has had a relatively normal postoperative course. \",\"The patient has had no current complaints.\",\"The patient has had improving normal postoperative pain. \",\"The patient has had no issues with the wound.\"}         Physical Exam  General: {gen appearance:55497::\"alert\",\"appears stated age\",\"cooperative\"}  Incision/Skin: {INCISION:66266::\"healing well\",\"no drainage\",\"no seroma\",\"incision well approximated\",\"no swelling\"}  Musculoskeletal:   {Zulu body part xray choices:00338}  {[employer] GLOBAL Total Joint Exam :20834}      Diagnostic Data:  {Diagnostics options As:46234}  {Optimal+ TKA XR Template (Optional):99139}  {Optimal+ XR Template (Optional):37716}       Assessment & Plan   Assessment:    No diagnosis found.      Plan:       {shara Post Op Advice:32423::\"Wound care instructions given\",\"Ice and/or modalities as beneficial\",\"Watch for signs and symptoms of infection\",\"Weight bearing parameters reviewed\",\"Take prescribed medications as instructed, but only as tolerated\",\"Aftercare and dental prophylaxis for joint replacement surgery.\",\"Discussion of orthopaedic goals and activities and patient and/or guardian expressed appreciation.\"}  {Zulu follow-up:27811}         There are no Patient Instructions on file for this visit.       Tamie Calabrese MA  2023       EMR Dragon/Transcription disclaimer:  Much of this encounter note is an electronic transcription/translation of spoken language to printed text. The electronic translation of spoken language may permit erroneous, or at times, nonsensical " words or phrases to be inadvertently transcribed; Although I have reviewed the note for such errors, some may still exist.

## 2023-11-14 NOTE — PROGRESS NOTES
OTHER POST OP GLOBAL     NAME: Ellen Valladares  ?  : 1975  ?  MRN: [id number]    Chief Complaint   Patient presents with    Left Knee - Post-op    Wound Check     ?  Date of surgery: 23  Date of I&D: 10/18/2023    HPI:   Patient returns today for 3 week follow up of left partial knee arthroplasty incision and drainage . Incision(s) healing nicely/as expected with no signs of infection. Patient reports doing well with no unusual complaints. Appears to be progressing appropriately.      Ortho Exam:   LEFT knee  Left knee-partial. The patients status is post unicondylar knee replacement postoperative 7 week(s).  Incision is clean. [place] is soft and nontender. Homans sign is negative. Patient has completed their course of anticoagulation. Range of motion in flexion is from 0- 125 degrees. Capillary refill is two seconds with a brisk return. Dosalis pedis and posterior tibial artery pulses are palpable. Common peroneal nerve function is well preserved. There is no medial or lateral instability. The pain that the patient had preoperatively has subsided significantly.      Diagnostic Studies:  xrays to be obtained at next visit      Assessment:  Diagnoses and all orders for this visit:    1. Status post left partial knee replacement (Primary)          Plan   Incision care  Sutures removed  Continue ice as needed  Aggressive ROM  Stretching and strengthening exercises  Fall precautions  Follow up as needed    Date of encounter: 2023  Myron Carranza MD

## 2023-12-05 ENCOUNTER — PATIENT MESSAGE (OUTPATIENT)
Dept: ORTHOPEDIC SURGERY | Facility: CLINIC | Age: 48
End: 2023-12-05
Payer: MEDICAID

## 2023-12-06 ENCOUNTER — OFFICE VISIT (OUTPATIENT)
Dept: ORTHOPEDIC SURGERY | Facility: CLINIC | Age: 48
End: 2023-12-06
Payer: MEDICAID

## 2023-12-06 VITALS — HEIGHT: 65 IN | RESPIRATION RATE: 18 BRPM | WEIGHT: 183 LBS | BODY MASS INDEX: 30.49 KG/M2

## 2023-12-06 DIAGNOSIS — M25.462 EFFUSION OF LEFT KNEE: Primary | ICD-10-CM

## 2023-12-06 PROCEDURE — 87205 SMEAR GRAM STAIN: CPT | Performed by: NURSE PRACTITIONER

## 2023-12-06 PROCEDURE — 87186 SC STD MICRODIL/AGAR DIL: CPT | Performed by: NURSE PRACTITIONER

## 2023-12-06 PROCEDURE — 87070 CULTURE OTHR SPECIMN AEROBIC: CPT | Performed by: NURSE PRACTITIONER

## 2023-12-06 PROCEDURE — 87147 CULTURE TYPE IMMUNOLOGIC: CPT | Performed by: NURSE PRACTITIONER

## 2023-12-06 RX ORDER — LIDOCAINE HYDROCHLORIDE 10 MG/ML
3.5 INJECTION, SOLUTION EPIDURAL; INFILTRATION; INTRACAUDAL; PERINEURAL
Status: COMPLETED | OUTPATIENT
Start: 2023-12-06 | End: 2023-12-06

## 2023-12-06 RX ORDER — SULFAMETHOXAZOLE AND TRIMETHOPRIM 800; 160 MG/1; MG/1
1 TABLET ORAL 2 TIMES DAILY
Qty: 30 TABLET | Refills: 0 | Status: SHIPPED | OUTPATIENT
Start: 2023-12-06 | End: 2023-12-21

## 2023-12-06 RX ADMIN — LIDOCAINE HYDROCHLORIDE 3.5 ML: 10 INJECTION, SOLUTION EPIDURAL; INFILTRATION; INTRACAUDAL; PERINEURAL at 12:56
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
## 2023-12-06 NOTE — TELEPHONE ENCOUNTER
PLEASE ADVISE. THANKS!   Patient was notified that their INR result was within therapeutic range. Patient was instructed to continue taking their Coumadin/Warfarin as follows: 5 mg M/W/F and 4 mg all other day.   Next INR is due in one month.  Patient was instructed to contact us with any unusual bleeding, bruising, any changes in medications, diet or health status and if there are any other questions or concerns.   Patient verbalized understanding of above.      Also notified of other lab results. Will repeat BMP on 9/10

## 2023-12-08 LAB
BACTERIA FLD CULT: ABNORMAL
GRAM STN SPEC: ABNORMAL
GRAM STN SPEC: ABNORMAL

## 2023-12-12 ENCOUNTER — OFFICE VISIT (OUTPATIENT)
Dept: ORTHOPEDIC SURGERY | Facility: CLINIC | Age: 48
End: 2023-12-12
Payer: MEDICAID

## 2023-12-12 VITALS — HEIGHT: 65 IN | WEIGHT: 183 LBS | RESPIRATION RATE: 20 BRPM | BODY MASS INDEX: 30.49 KG/M2 | OXYGEN SATURATION: 98 %

## 2023-12-12 DIAGNOSIS — M25.462 EFFUSION OF LEFT KNEE: Primary | ICD-10-CM

## 2023-12-12 DIAGNOSIS — L08.9 WOUND INFECTION: ICD-10-CM

## 2023-12-12 DIAGNOSIS — T14.8XXA WOUND INFECTION: ICD-10-CM

## 2023-12-12 DIAGNOSIS — Z96.652 STATUS POST LEFT PARTIAL KNEE REPLACEMENT: ICD-10-CM

## 2023-12-12 RX ORDER — SULFAMETHOXAZOLE AND TRIMETHOPRIM 800; 160 MG/1; MG/1
1 TABLET ORAL 2 TIMES DAILY
Qty: 20 TABLET | Refills: 0 | Status: SHIPPED | OUTPATIENT
Start: 2023-12-12 | End: 2023-12-18 | Stop reason: SDUPTHER

## 2023-12-12 RX ORDER — DOXYCYCLINE HYCLATE 100 MG/1
100 CAPSULE ORAL 2 TIMES DAILY
Qty: 28 CAPSULE | Refills: 0 | Status: SHIPPED | OUTPATIENT
Start: 2023-12-12 | End: 2023-12-26

## 2023-12-12 RX ORDER — LIDOCAINE HYDROCHLORIDE 10 MG/ML
2 INJECTION, SOLUTION EPIDURAL; INFILTRATION; INTRACAUDAL; PERINEURAL
Status: COMPLETED | OUTPATIENT
Start: 2023-12-12 | End: 2023-12-12

## 2023-12-12 RX ADMIN — LIDOCAINE HYDROCHLORIDE 2 ML: 10 INJECTION, SOLUTION EPIDURAL; INFILTRATION; INTRACAUDAL; PERINEURAL at 15:10
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
## 2023-12-18 RX ORDER — SULFAMETHOXAZOLE AND TRIMETHOPRIM 800; 160 MG/1; MG/1
TABLET ORAL
Qty: 30 TABLET | Refills: 0 | Status: SHIPPED | OUTPATIENT
Start: 2023-12-18

## 2023-12-21 ENCOUNTER — OFFICE VISIT (OUTPATIENT)
Dept: ORTHOPEDIC SURGERY | Facility: CLINIC | Age: 48
End: 2023-12-21
Payer: MEDICAID

## 2023-12-21 ENCOUNTER — TELEPHONE (OUTPATIENT)
Dept: ORTHOPEDIC SURGERY | Facility: CLINIC | Age: 48
End: 2023-12-21

## 2023-12-21 VITALS — HEIGHT: 65 IN | WEIGHT: 183 LBS | RESPIRATION RATE: 20 BRPM | OXYGEN SATURATION: 98 % | BODY MASS INDEX: 30.49 KG/M2

## 2023-12-21 DIAGNOSIS — G89.29 CHRONIC PAIN OF LEFT KNEE: Primary | ICD-10-CM

## 2023-12-21 DIAGNOSIS — L08.9 WOUND INFECTION: ICD-10-CM

## 2023-12-21 DIAGNOSIS — M25.462 EFFUSION OF LEFT KNEE: ICD-10-CM

## 2023-12-21 DIAGNOSIS — M25.562 CHRONIC PAIN OF LEFT KNEE: Primary | ICD-10-CM

## 2023-12-21 DIAGNOSIS — T14.8XXA WOUND INFECTION: ICD-10-CM

## 2023-12-21 PROCEDURE — 99213 OFFICE O/P EST LOW 20 MIN: CPT | Performed by: NURSE PRACTITIONER

## 2023-12-21 NOTE — TELEPHONE ENCOUNTER
Provider: KRUNAL BARNETT     Caller: ROSS MADDOX     Relationship to Patient: PATIENT     Phone Number: [phone]    Reason for Call: sulfamethoxazole-trimethoprim     When was the patient last seen: 12/21/2023    PATIENT STATES SHE WANTS TO LET KRUNAL KNOW SHE DID  RX  sulfamethoxazole-trimethoprim     PATIENT WANTS TO WISH KRUNAL MICHAEL AND EVERYONE THERE [employer] [employer]ES AND THANK FOR FOR ALL YOU DO
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
## 2024-01-04 ENCOUNTER — OFFICE VISIT (OUTPATIENT)
Dept: ORTHOPEDIC SURGERY | Facility: CLINIC | Age: 49
End: 2024-01-04
Payer: MEDICAID

## 2024-01-04 VITALS — HEIGHT: 65 IN | RESPIRATION RATE: 20 BRPM | WEIGHT: 183 LBS | BODY MASS INDEX: 30.49 KG/M2 | OXYGEN SATURATION: 98 %

## 2024-01-04 DIAGNOSIS — T14.8XXA WOUND INFECTION: ICD-10-CM

## 2024-01-04 DIAGNOSIS — L08.9 WOUND INFECTION: ICD-10-CM

## 2024-01-04 DIAGNOSIS — M25.462 EFFUSION OF LEFT KNEE: Primary | ICD-10-CM
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
## (undated) DEVICE — CVR HNDL LT SURG ACCSSRY BLU STRL

## (undated) DEVICE — PAD UNDERCAST WYTEX 4IN 4YD LF STRL

## (undated) DEVICE — GLV SURG SIGNATURE ESSENTIAL PF LTX SZ8.5

## (undated) DEVICE — PREP SOL DYNA-HEX CHG LIQ 4% BT 4OZ

## (undated) DEVICE — SUT ETHLN 2/0 PS 18IN 585H

## (undated) DEVICE — 3M™ IOBAN™ 2 ANTIMICROBIAL INCISE DRAPE 6650EZ: Brand: IOBAN™ 2

## (undated) DEVICE — SUT MNCRYL 2/0 CT1 36IN UD MCP945H

## (undated) DEVICE — NDL FLTR 19G 1 1/2 LF

## (undated) DEVICE — GLV SURG SENSICARE PI MIC PF SZ7.5 LF STRL

## (undated) DEVICE — GLV SURG SENSICARE PI ORTHO SZ8.5 LF STRL

## (undated) DEVICE — ESWAB LQ AMIES  REG. NYLON FLOCKED  APPLICATOR: Brand: ESWAB™

## (undated) DEVICE — SLV SCD CALF HEMOFORCE DVT THERP REPROC MD

## (undated) DEVICE — GAUZE,SPONGE,FLUFF,6"X6.75",STRL,5/TRAY: Brand: MEDLINE

## (undated) DEVICE — DRAPE,U/ SHT,SPLIT,PLAS,STERIL: Brand: MEDLINE

## (undated) DEVICE — PK EXTREM 50

## (undated) DEVICE — SYR LL W/SCALE/MARK 3ML STRL

## (undated) DEVICE — GOWN,REINFORCE,POLY,SIRUS,BREATH SLV,XLG: Brand: MEDLINE

## (undated) DEVICE — NDL HYPO PRECISIONGLIDE/REG 18G 11/2 PNK

## (undated) DEVICE — DRSNG GZ CURAD XEROFORM NONADHR OVERWRAP 5X9IN

## (undated) DEVICE — KT SURG TURNOVER 050

## (undated) DEVICE — SOL HYDROGEN PEROX 3PCT 8OZ

## (undated) DEVICE — PROXIMATE RH ROTATING HEAD SKIN STAPLERS (35 WIDE) CONTAINS 35 STAINLESS STEEL STAPLES: Brand: PROXIMATE

## (undated) DEVICE — GLV SURG SENSICARE PI LF PF 8 GRN STRL

## (undated) DEVICE — SOLUTION,WATER,IRRIGATION,1000ML,STERILE: Brand: MEDLINE